# Patient Record
Sex: FEMALE | Race: WHITE | NOT HISPANIC OR LATINO | Employment: OTHER | URBAN - METROPOLITAN AREA
[De-identification: names, ages, dates, MRNs, and addresses within clinical notes are randomized per-mention and may not be internally consistent; named-entity substitution may affect disease eponyms.]

---

## 2023-01-31 PROBLEM — H52.7 REFRACTIVE ERROR: Status: ACTIVE | Noted: 2023-01-31

## 2023-01-31 PROBLEM — F51.01 PRIMARY INSOMNIA: Status: ACTIVE | Noted: 2023-01-31

## 2023-01-31 PROBLEM — K21.9 GERD WITHOUT ESOPHAGITIS: Status: ACTIVE | Noted: 2023-01-31

## 2023-01-31 PROBLEM — I48.91 ATRIAL FIBRILLATION (MULTI): Status: ACTIVE | Noted: 2023-01-31

## 2023-01-31 PROBLEM — E66.811 CLASS 1 OBESITY WITH BODY MASS INDEX (BMI) OF 31.0 TO 31.9 IN ADULT: Status: ACTIVE | Noted: 2023-01-31

## 2023-01-31 PROBLEM — R05.8 SPASMODIC COUGH: Status: ACTIVE | Noted: 2023-01-31

## 2023-01-31 PROBLEM — M48.062 SPINAL STENOSIS OF LUMBAR REGION WITH NEUROGENIC CLAUDICATION: Status: ACTIVE | Noted: 2023-01-31

## 2023-01-31 PROBLEM — H02.033: Status: ACTIVE | Noted: 2023-01-31

## 2023-01-31 PROBLEM — R42 DIZZINESS: Status: ACTIVE | Noted: 2023-01-31

## 2023-01-31 PROBLEM — J34.2 DEVIATED SEPTUM: Status: ACTIVE | Noted: 2023-01-31

## 2023-01-31 PROBLEM — I10 BENIGN ESSENTIAL HTN: Status: ACTIVE | Noted: 2023-01-31

## 2023-01-31 PROBLEM — M54.12 CERVICAL RADICULOPATHY AT C5: Status: ACTIVE | Noted: 2023-01-31

## 2023-01-31 PROBLEM — Z97.3 WEARS GLASSES: Status: ACTIVE | Noted: 2023-01-31

## 2023-01-31 PROBLEM — M85.88 OSTEOPENIA OF SPINE: Status: ACTIVE | Noted: 2023-01-31

## 2023-01-31 PROBLEM — F41.9 ANXIETY: Status: ACTIVE | Noted: 2023-01-31

## 2023-01-31 PROBLEM — E66.9 CLASS 1 OBESITY WITH BODY MASS INDEX (BMI) OF 31.0 TO 31.9 IN ADULT: Status: ACTIVE | Noted: 2023-01-31

## 2023-01-31 PROBLEM — H26.9 BILATERAL CATARACTS: Status: ACTIVE | Noted: 2023-01-31

## 2023-01-31 PROBLEM — J31.0 CHRONIC RHINITIS: Status: ACTIVE | Noted: 2023-01-31

## 2023-01-31 PROBLEM — E78.5 HYPERLIPIDEMIA: Status: ACTIVE | Noted: 2023-01-31

## 2023-01-31 PROBLEM — H02.003: Status: ACTIVE | Noted: 2023-01-31

## 2023-01-31 PROBLEM — R11.0 NAUSEA IN ADULT: Status: ACTIVE | Noted: 2023-01-31

## 2023-01-31 PROBLEM — R26.89 BALANCE DISORDER: Status: ACTIVE | Noted: 2023-01-31

## 2023-01-31 PROBLEM — H18.9 EXPOSURE KERATOPATHY: Status: ACTIVE | Noted: 2023-01-31

## 2023-01-31 PROBLEM — M43.10 SPONDYLOLISTHESIS, ACQUIRED: Status: ACTIVE | Noted: 2023-01-31

## 2023-01-31 PROBLEM — H02.036: Status: ACTIVE | Noted: 2023-01-31

## 2023-01-31 PROBLEM — H43.813 DEGENERATION OF POSTERIOR VITREOUS BODY OF BOTH EYES: Status: ACTIVE | Noted: 2023-01-31

## 2023-01-31 RX ORDER — ESCITALOPRAM OXALATE 10 MG/1
1 TABLET ORAL DAILY
COMMUNITY
Start: 2012-01-11 | End: 2023-03-16 | Stop reason: ALTCHOICE

## 2023-01-31 RX ORDER — DILTIAZEM HYDROCHLORIDE 120 MG/1
1 TABLET, FILM COATED ORAL 2 TIMES DAILY
COMMUNITY
Start: 2019-02-13 | End: 2023-07-19 | Stop reason: SDUPTHER

## 2023-01-31 RX ORDER — GABAPENTIN 100 MG/1
3 CAPSULE ORAL 3 TIMES DAILY
COMMUNITY
Start: 2020-02-04 | End: 2024-05-24 | Stop reason: ENTERED-IN-ERROR

## 2023-01-31 RX ORDER — TRAZODONE HYDROCHLORIDE 50 MG/1
1 TABLET ORAL DAILY
COMMUNITY
Start: 2019-08-19 | End: 2023-03-15 | Stop reason: SINTOL

## 2023-01-31 RX ORDER — FLUTICASONE PROPIONATE 50 MCG
1 SPRAY, SUSPENSION (ML) NASAL 2 TIMES DAILY
COMMUNITY
End: 2024-06-10

## 2023-01-31 RX ORDER — OMEPRAZOLE 40 MG/1
1 CAPSULE, DELAYED RELEASE ORAL DAILY
COMMUNITY
Start: 2015-03-17 | End: 2023-07-19 | Stop reason: SDUPTHER

## 2023-03-14 ENCOUNTER — APPOINTMENT (OUTPATIENT)
Dept: PRIMARY CARE | Facility: CLINIC | Age: 83
End: 2023-03-14
Payer: MEDICARE

## 2023-03-15 ENCOUNTER — OFFICE VISIT (OUTPATIENT)
Dept: PRIMARY CARE | Facility: CLINIC | Age: 83
End: 2023-03-15
Payer: MEDICARE

## 2023-03-15 VITALS
RESPIRATION RATE: 16 BRPM | HEIGHT: 63 IN | DIASTOLIC BLOOD PRESSURE: 81 MMHG | OXYGEN SATURATION: 97 % | BODY MASS INDEX: 31.01 KG/M2 | WEIGHT: 175 LBS | HEART RATE: 81 BPM | SYSTOLIC BLOOD PRESSURE: 127 MMHG

## 2023-03-15 DIAGNOSIS — B37.2 CANDIDAL INTERTRIGO: ICD-10-CM

## 2023-03-15 DIAGNOSIS — Z00.00 HEALTH CARE MAINTENANCE: ICD-10-CM

## 2023-03-15 DIAGNOSIS — I10 HYPERTENSION, UNSPECIFIED TYPE: ICD-10-CM

## 2023-03-15 DIAGNOSIS — F41.9 ANXIETY: Primary | ICD-10-CM

## 2023-03-15 DIAGNOSIS — R68.89 FORGETFULNESS: ICD-10-CM

## 2023-03-15 DIAGNOSIS — F51.01 PRIMARY INSOMNIA: ICD-10-CM

## 2023-03-15 DIAGNOSIS — B37.2 CANDIDIASIS, INTERTRIGO: ICD-10-CM

## 2023-03-15 PROBLEM — R05.8 SPASMODIC COUGH: Status: RESOLVED | Noted: 2023-01-31 | Resolved: 2023-03-15

## 2023-03-15 PROBLEM — N39.0 UTI (URINARY TRACT INFECTION): Status: ACTIVE | Noted: 2023-03-15

## 2023-03-15 PROBLEM — U07.1 COVID-19: Status: ACTIVE | Noted: 2023-03-15

## 2023-03-15 PROBLEM — R42 VERTIGO: Status: ACTIVE | Noted: 2023-03-15

## 2023-03-15 PROBLEM — R04.0 EPISTAXIS: Status: ACTIVE | Noted: 2023-03-15

## 2023-03-15 PROBLEM — R11.0 NAUSEA IN ADULT: Status: RESOLVED | Noted: 2023-01-31 | Resolved: 2023-03-15

## 2023-03-15 PROCEDURE — 3074F SYST BP LT 130 MM HG: CPT | Performed by: INTERNAL MEDICINE

## 2023-03-15 PROCEDURE — 3079F DIAST BP 80-89 MM HG: CPT | Performed by: INTERNAL MEDICINE

## 2023-03-15 PROCEDURE — 1036F TOBACCO NON-USER: CPT | Performed by: INTERNAL MEDICINE

## 2023-03-15 PROCEDURE — 99214 OFFICE O/P EST MOD 30 MIN: CPT | Performed by: INTERNAL MEDICINE

## 2023-03-15 PROCEDURE — 1160F RVW MEDS BY RX/DR IN RCRD: CPT | Performed by: INTERNAL MEDICINE

## 2023-03-15 PROCEDURE — 1159F MED LIST DOCD IN RCRD: CPT | Performed by: INTERNAL MEDICINE

## 2023-03-15 RX ORDER — ESCITALOPRAM OXALATE 10 MG/1
10 TABLET ORAL ONCE
Status: DISCONTINUED | OUTPATIENT
Start: 2023-03-15 | End: 2023-03-16

## 2023-03-15 RX ORDER — MULTIVITAMIN
1 TABLET ORAL DAILY
COMMUNITY
End: 2024-06-10

## 2023-03-15 RX ORDER — NYSTATIN 100000 U/G
CREAM TOPICAL 2 TIMES DAILY
Qty: 30 G | Refills: 1 | Status: SHIPPED | OUTPATIENT
Start: 2023-03-15 | End: 2023-03-29

## 2023-03-15 RX ORDER — METOPROLOL SUCCINATE 25 MG/1
TABLET, EXTENDED RELEASE ORAL
COMMUNITY
Start: 2023-03-09 | End: 2024-02-19

## 2023-03-15 RX ORDER — NYSTATIN 100000 [USP'U]/G
POWDER TOPICAL 3 TIMES DAILY
Qty: 360 G | Refills: 11 | Status: SHIPPED | OUTPATIENT
Start: 2023-03-15 | End: 2023-04-14

## 2023-03-15 RX ORDER — POTASSIUM CHLORIDE 750 MG/1
10 TABLET, EXTENDED RELEASE ORAL DAILY
COMMUNITY
Start: 2023-03-09 | End: 2024-01-22

## 2023-03-15 RX ORDER — NYSTATIN 100000 [USP'U]/G
POWDER TOPICAL
COMMUNITY
Start: 2022-09-03 | End: 2023-03-15 | Stop reason: SDUPTHER

## 2023-03-15 ASSESSMENT — ENCOUNTER SYMPTOMS: NERVOUS/ANXIOUS: 1

## 2023-03-15 NOTE — PROGRESS NOTES
Subjective   Patient ID: Mercedes Castellanos is a 83 y.o. female who presents for Follow-up (Pt here for results ).  HPI    Fu  memory loss and review ct head. Doing better.   Never took trazodone. Would  like  to take lexapro. Had  before and it worked well.     Treated anxiety  and insomnia  due to  significant stressors in life.     Co left inframmamry  pain  3 dasy.     Co  candida inf  under  breast.       Review of Systems   Neurological:         Per hpi   Psychiatric/Behavioral:  The patient is nervous/anxious.    All other systems reviewed and are negative.      Objective   Physical Exam  Vitals and nursing note reviewed. Exam conducted with a chaperone present.   Constitutional:       Appearance: Normal appearance.   HENT:      Head: Normocephalic and atraumatic.      Right Ear: Tympanic membrane, ear canal and external ear normal.      Left Ear: Tympanic membrane, ear canal and external ear normal.      Nose: Nose normal.      Mouth/Throat:      Mouth: Mucous membranes are moist.      Pharynx: Oropharynx is clear.   Eyes:      Extraocular Movements: Extraocular movements intact.      Conjunctiva/sclera: Conjunctivae normal.      Pupils: Pupils are equal, round, and reactive to light.   Cardiovascular:      Rate and Rhythm: Normal rate and regular rhythm.      Pulses: Normal pulses.      Heart sounds: Normal heart sounds.   Pulmonary:      Effort: Pulmonary effort is normal.      Breath sounds: Normal breath sounds.   Abdominal:      General: Abdomen is flat. Bowel sounds are normal.      Palpations: Abdomen is soft.   Musculoskeletal:         General: Normal range of motion.      Cervical back: Neck supple.   Skin:     General: Skin is warm and dry.      Capillary Refill: Capillary refill takes 2 to 3 seconds.   Neurological:      General: No focal deficit present.      Mental Status: She is alert and oriented to person, place, and time. Mental status is at baseline.   Psychiatric:         Mood and Affect: Mood  normal.         Behavior: Behavior normal.         Thought Content: Thought content normal.         Judgment: Judgment normal.         Assessment/Plan   Problem List Items Addressed This Visit          Medium    Anxiety - Primary    Relevant Medications    escitalopram (Lexapro) tablet 10 mg    Primary insomnia    Candidal intertrigo    Relevant Medications    nystatin (Mycostatin) cream     Other Visit Diagnoses       Hypertension, unspecified type        Health care maintenance        Relevant Medications    multivitamin tablet    potassium chloride CR 10 mEq ER tablet    Candidiasis, intertrigo        Relevant Medications    nystatin (Mycostatin) 100,000 unit/gram powder    Forgetfulness                                  Memory issues -Resolved.     Time Spent  Prep time on day of patient encounter: 5 minutes  Time spent directly with patient, family or caregiver: 30 minutes  Documentation Time: 5 minutes

## 2023-03-16 RX ORDER — ESCITALOPRAM OXALATE 10 MG/1
10 TABLET ORAL DAILY
Qty: 30 TABLET | Refills: 5 | Status: SHIPPED | OUTPATIENT
Start: 2023-03-16 | End: 2023-12-20 | Stop reason: SDUPTHER

## 2023-05-10 ENCOUNTER — TELEPHONE (OUTPATIENT)
Dept: PRIMARY CARE | Facility: CLINIC | Age: 83
End: 2023-05-10
Payer: MEDICARE

## 2023-05-10 DIAGNOSIS — N30.00 ACUTE CYSTITIS WITHOUT HEMATURIA: Primary | ICD-10-CM

## 2023-05-10 RX ORDER — CEPHALEXIN 500 MG/1
500 CAPSULE ORAL 2 TIMES DAILY
Qty: 20 CAPSULE | Refills: 0 | Status: SHIPPED | OUTPATIENT
Start: 2023-05-10 | End: 2023-05-20

## 2023-05-30 ENCOUNTER — TELEPHONE (OUTPATIENT)
Dept: PRIMARY CARE | Facility: CLINIC | Age: 83
End: 2023-05-30
Payer: MEDICARE

## 2023-05-30 DIAGNOSIS — R19.7 DIARRHEA, UNSPECIFIED TYPE: ICD-10-CM

## 2023-05-30 NOTE — TELEPHONE ENCOUNTER
Homecare CNP Called to stat pt is experiencing diarrhea after finishing an antibiotic and has lack of appetite, asking for stool test for cdiff..

## 2023-06-06 ENCOUNTER — TELEPHONE (OUTPATIENT)
Dept: PRIMARY CARE | Facility: CLINIC | Age: 83
End: 2023-06-06
Payer: MEDICARE

## 2023-06-06 DIAGNOSIS — N30.00 ACUTE CYSTITIS WITHOUT HEMATURIA: Primary | ICD-10-CM

## 2023-06-06 RX ORDER — CEPHALEXIN 500 MG/1
500 CAPSULE ORAL 2 TIMES DAILY
Qty: 20 CAPSULE | Refills: 0 | Status: SHIPPED | OUTPATIENT
Start: 2023-06-06 | End: 2023-06-07

## 2023-06-06 NOTE — TELEPHONE ENCOUNTER
Pt states she has bladder infection she has pain traveling down her arms and legs x 3 days. Asking for antibiotic to help

## 2023-06-07 DIAGNOSIS — N30.00 ACUTE CYSTITIS WITHOUT HEMATURIA: ICD-10-CM

## 2023-06-07 RX ORDER — CEPHALEXIN 500 MG/1
500 CAPSULE ORAL 2 TIMES DAILY
Qty: 20 CAPSULE | Refills: 0 | Status: SHIPPED | OUTPATIENT
Start: 2023-06-07 | End: 2023-06-17

## 2023-06-18 PROBLEM — Z00.00 MEDICARE ANNUAL WELLNESS VISIT, SUBSEQUENT: Status: ACTIVE | Noted: 2023-06-18

## 2023-07-19 DIAGNOSIS — K21.9 GERD WITHOUT ESOPHAGITIS: ICD-10-CM

## 2023-07-19 DIAGNOSIS — I10 BENIGN ESSENTIAL HTN: Primary | ICD-10-CM

## 2023-07-19 RX ORDER — DILTIAZEM HYDROCHLORIDE 120 MG/1
120 TABLET, FILM COATED ORAL 2 TIMES DAILY
Qty: 90 TABLET | Refills: 1 | Status: SHIPPED | OUTPATIENT
Start: 2023-07-19 | End: 2023-10-26 | Stop reason: SDUPTHER

## 2023-07-19 RX ORDER — OMEPRAZOLE 40 MG/1
40 CAPSULE, DELAYED RELEASE ORAL
Qty: 90 CAPSULE | Refills: 3 | Status: SHIPPED | OUTPATIENT
Start: 2023-07-19

## 2023-08-15 PROBLEM — I42.9 CARDIOMYOPATHY (MULTI): Status: ACTIVE | Noted: 2023-08-15

## 2023-08-15 PROBLEM — I48.0 PAF (PAROXYSMAL ATRIAL FIBRILLATION) (MULTI): Status: ACTIVE | Noted: 2023-08-15

## 2023-08-15 PROBLEM — I20.89 ANGINAL EQUIVALENT (CMS-HCC): Status: ACTIVE | Noted: 2023-08-15

## 2023-08-15 PROBLEM — R19.7 DIARRHEA: Status: RESOLVED | Noted: 2023-08-15 | Resolved: 2023-08-15

## 2023-08-15 PROBLEM — I10 ESSENTIAL HYPERTENSION: Status: ACTIVE | Noted: 2023-08-15

## 2023-08-16 ENCOUNTER — APPOINTMENT (OUTPATIENT)
Dept: PRIMARY CARE | Facility: CLINIC | Age: 83
End: 2023-08-16
Payer: MEDICARE

## 2023-09-05 NOTE — PROGRESS NOTES
Subjective   Patient ID: Mercedes Castellanos is a 83 y.o. female who presents for Annual Exam (Medicare wellness ) and Med Refill.  Med Refill      Awv    Fu  grief.      Needs melinda and  dexa.     Below is the patient's most recent value for Albumin, ALT, AST, BUN, Calcium, Chloride, Cholesterol, CO2, Creatinine, GFR, Glucose, HDL, Hematocrit, Hemoglobin, Hemoglobin A1C, LDL, Magnesium, Phosphorus, Platelets, Potassium, PSA, Sodium, Triglycerides, and WBC.   Lab Results   Component Value Date    ALBUMIN 4.1 01/27/2023    ALT 14 01/27/2023    AST 19 01/27/2023    BUN 17 01/27/2023    CALCIUM 10.2 01/27/2023     01/27/2023    CHOL 149 01/27/2023    CO2 31 01/27/2023    CREATININE 1.04 01/27/2023    HDL 46.8 01/27/2023    HCT 45.2 02/13/2023    HGB 14.9 02/13/2023    PHOS 3.0 10/20/2017     02/13/2023    K 4.6 01/27/2023     01/27/2023    TRIG 66 01/27/2023    WBC 9.3 02/13/2023     Note: for a comprehensive list of the patient's lab results, access the Results Review activity.    Review of Systems   Constitutional: Negative.    Psychiatric/Behavioral:  Positive for dysphoric mood.    All other systems reviewed and are negative.      Objective   Physical Exam  Vitals and nursing note reviewed.   Constitutional:       Appearance: Normal appearance.   HENT:      Head: Normocephalic and atraumatic.      Nose: Nose normal.   Eyes:      Conjunctiva/sclera: Conjunctivae normal.   Cardiovascular:      Rate and Rhythm: Normal rate and regular rhythm.   Pulmonary:      Effort: Pulmonary effort is normal.   Skin:     General: Skin is dry.   Neurological:      General: No focal deficit present.      Mental Status: She is alert and oriented to person, place, and time. Mental status is at baseline.   Psychiatric:         Mood and Affect: Mood normal.         Behavior: Behavior normal.         Assessment/Plan        Awv    Below is the patient's most recent value for Albumin, ALT, AST, BUN, Calcium, Chloride,  Cholesterol, CO2, Creatinine, GFR, Glucose, HDL, Hematocrit, Hemoglobin, Hemoglobin A1C, LDL, Magnesium, Phosphorus, Platelets, Potassium, PSA, Sodium, Triglycerides, and WBC.   Lab Results   Component Value Date    ALBUMIN 4.1 01/27/2023    ALT 14 01/27/2023    AST 19 01/27/2023    BUN 17 01/27/2023    CALCIUM 10.2 01/27/2023     01/27/2023    CHOL 149 01/27/2023    CO2 31 01/27/2023    CREATININE 1.04 01/27/2023    HDL 46.8 01/27/2023    HCT 45.2 02/13/2023    HGB 14.9 02/13/2023    PHOS 3.0 10/20/2017     02/13/2023    K 4.6 01/27/2023     01/27/2023    TRIG 66 01/27/2023    WBC 9.3 02/13/2023     Note: for a comprehensive list of the patient's lab results, access the Results Review activity.

## 2023-09-06 ENCOUNTER — OFFICE VISIT (OUTPATIENT)
Dept: PRIMARY CARE | Facility: CLINIC | Age: 83
End: 2023-09-06
Payer: MEDICARE

## 2023-09-06 ENCOUNTER — LAB (OUTPATIENT)
Dept: LAB | Facility: LAB | Age: 83
End: 2023-09-06
Payer: MEDICARE

## 2023-09-06 VITALS
HEART RATE: 86 BPM | OXYGEN SATURATION: 94 % | SYSTOLIC BLOOD PRESSURE: 96 MMHG | BODY MASS INDEX: 30.65 KG/M2 | WEIGHT: 173 LBS | DIASTOLIC BLOOD PRESSURE: 65 MMHG | HEIGHT: 63 IN

## 2023-09-06 DIAGNOSIS — Z79.899 MEDICATION MANAGEMENT: ICD-10-CM

## 2023-09-06 DIAGNOSIS — I48.91 ATRIAL FIBRILLATION, UNSPECIFIED TYPE (MULTI): ICD-10-CM

## 2023-09-06 DIAGNOSIS — I10 ESSENTIAL HYPERTENSION: ICD-10-CM

## 2023-09-06 DIAGNOSIS — I42.9 CARDIOMYOPATHY, UNSPECIFIED TYPE (MULTI): ICD-10-CM

## 2023-09-06 DIAGNOSIS — Z23 NEED FOR VACCINATION: ICD-10-CM

## 2023-09-06 DIAGNOSIS — E78.5 HYPERLIPIDEMIA, UNSPECIFIED HYPERLIPIDEMIA TYPE: ICD-10-CM

## 2023-09-06 DIAGNOSIS — K21.9 GERD WITHOUT ESOPHAGITIS: ICD-10-CM

## 2023-09-06 DIAGNOSIS — F43.21 GRIEF REACTION: ICD-10-CM

## 2023-09-06 DIAGNOSIS — E66.9 CLASS 1 OBESITY WITH BODY MASS INDEX (BMI) OF 30.0 TO 30.9 IN ADULT, UNSPECIFIED OBESITY TYPE, UNSPECIFIED WHETHER SERIOUS COMORBIDITY PRESENT: ICD-10-CM

## 2023-09-06 DIAGNOSIS — I20.89 ANGINAL EQUIVALENT (CMS-HCC): ICD-10-CM

## 2023-09-06 DIAGNOSIS — Z00.00 HEALTHCARE MAINTENANCE: Primary | ICD-10-CM

## 2023-09-06 DIAGNOSIS — Z13.89 SCREENING FOR MULTIPLE CONDITIONS: ICD-10-CM

## 2023-09-06 PROCEDURE — 99397 PER PM REEVAL EST PAT 65+ YR: CPT | Performed by: INTERNAL MEDICINE

## 2023-09-06 PROCEDURE — 1159F MED LIST DOCD IN RCRD: CPT | Performed by: INTERNAL MEDICINE

## 2023-09-06 PROCEDURE — 1036F TOBACCO NON-USER: CPT | Performed by: INTERNAL MEDICINE

## 2023-09-06 PROCEDURE — 3078F DIAST BP <80 MM HG: CPT | Performed by: INTERNAL MEDICINE

## 2023-09-06 PROCEDURE — 82306 VITAMIN D 25 HYDROXY: CPT

## 2023-09-06 PROCEDURE — 1160F RVW MEDS BY RX/DR IN RCRD: CPT | Performed by: INTERNAL MEDICINE

## 2023-09-06 PROCEDURE — 1170F FXNL STATUS ASSESSED: CPT | Performed by: INTERNAL MEDICINE

## 2023-09-06 PROCEDURE — G0439 PPPS, SUBSEQ VISIT: HCPCS | Performed by: INTERNAL MEDICINE

## 2023-09-06 PROCEDURE — 1126F AMNT PAIN NOTED NONE PRSNT: CPT | Performed by: INTERNAL MEDICINE

## 2023-09-06 PROCEDURE — 82607 VITAMIN B-12: CPT

## 2023-09-06 PROCEDURE — 36415 COLL VENOUS BLD VENIPUNCTURE: CPT

## 2023-09-06 PROCEDURE — 3074F SYST BP LT 130 MM HG: CPT | Performed by: INTERNAL MEDICINE

## 2023-09-06 RX ORDER — CALCIUM CARBONATE 300MG(750)
400 TABLET,CHEWABLE ORAL DAILY
COMMUNITY

## 2023-09-06 ASSESSMENT — ENCOUNTER SYMPTOMS
OCCASIONAL FEELINGS OF UNSTEADINESS: 0
DYSPHORIC MOOD: 1
CONSTITUTIONAL NEGATIVE: 1
AGITATION: 1
DEPRESSION: 0
LOSS OF SENSATION IN FEET: 0

## 2023-09-06 ASSESSMENT — COLUMBIA-SUICIDE SEVERITY RATING SCALE - C-SSRS
6. HAVE YOU EVER DONE ANYTHING, STARTED TO DO ANYTHING, OR PREPARED TO DO ANYTHING TO END YOUR LIFE?: NO
1. IN THE PAST MONTH, HAVE YOU WISHED YOU WERE DEAD OR WISHED YOU COULD GO TO SLEEP AND NOT WAKE UP?: NO
2. HAVE YOU ACTUALLY HAD ANY THOUGHTS OF KILLING YOURSELF?: NO

## 2023-09-06 ASSESSMENT — ACTIVITIES OF DAILY LIVING (ADL)
TAKING_MEDICATION: INDEPENDENT
DOING_HOUSEWORK: INDEPENDENT
DRESSING: INDEPENDENT
GROCERY_SHOPPING: INDEPENDENT
MANAGING_FINANCES: INDEPENDENT
BATHING: INDEPENDENT

## 2023-09-06 ASSESSMENT — PATIENT HEALTH QUESTIONNAIRE - PHQ9
2. FEELING DOWN, DEPRESSED OR HOPELESS: NOT AT ALL
1. LITTLE INTEREST OR PLEASURE IN DOING THINGS: NOT AT ALL
SUM OF ALL RESPONSES TO PHQ9 QUESTIONS 1 AND 2: 0

## 2023-09-06 NOTE — PROGRESS NOTES
"Subjective   Patient ID: Mercedes Castellanos is a 83 y.o. female who presents for Annual Exam (Medicare wellness ) and Med Refill.    HPI     Review of Systems    Objective   BP 96/65   Pulse 86   Ht 1.6 m (5' 3\")   Wt 78.5 kg (173 lb)   SpO2 94%   BMI 30.65 kg/m²     Physical Exam    Assessment/Plan          "

## 2023-09-06 NOTE — PROGRESS NOTES
"Subjective   Reason for Visit: Mercedes Castellanos is an 83 y.o. female here for a Medicare Wellness visit.     Past Medical, Surgical, and Family History reviewed and updated in chart.    Reviewed all medications by prescribing practitioner or clinical pharmacist (such as prescriptions, OTCs, herbal therapies and supplements) and documented in the medical record.    HPI    Grief rx .  Feeling better.  Son  schizophrenic fell of a bridge.     Feels her memory is  better.    Dtr added magnesium  pills.       Patient Care Team:  Tiana Danielle MD as PCP - General  Tiana Danielle MD (Internal Medicine)     Review of Systems   Constitutional: Negative.    Psychiatric/Behavioral:  Positive for agitation and dysphoric mood.    All other systems reviewed and are negative.      Objective   Vitals:  BP 96/65   Pulse 86   Ht 1.6 m (5' 3\")   Wt 78.5 kg (173 lb)   SpO2 94%   BMI 30.65 kg/m²       Physical Exam  Vitals and nursing note reviewed.   Constitutional:       Appearance: Normal appearance.   HENT:      Head: Normocephalic and atraumatic.      Nose: Nose normal.   Eyes:      Conjunctiva/sclera: Conjunctivae normal.   Cardiovascular:      Rate and Rhythm: Normal rate and regular rhythm.      Pulses: Normal pulses.      Heart sounds: Normal heart sounds.   Pulmonary:      Effort: Pulmonary effort is normal.      Breath sounds: Normal breath sounds.   Musculoskeletal:         General: Normal range of motion.   Skin:     General: Skin is dry.      Capillary Refill: Capillary refill takes less than 2 seconds.   Neurological:      General: No focal deficit present.      Mental Status: She is alert and oriented to person, place, and time. Mental status is at baseline.   Psychiatric:         Mood and Affect: Mood normal.         Behavior: Behavior normal.         Thought Content: Thought content normal.         Assessment/Plan   Problem List Items Addressed This Visit          Medium    Anginal equivalent (CMS/HCC)    " Atrial fibrillation (CMS/HCC)    Cardiomyopathy (CMS/HCC)    Essential hypertension    GERD without esophagitis    Relevant Orders    Vitamin D 25-Hydroxy,Total (for eval of Vitamin D levels)    Vitamin B12    Hyperlipidemia     Other Visit Diagnoses       Healthcare maintenance    -  Primary    Relevant Orders    1 Year Follow Up In Primary Care - Wellness Exam    3 Month Follow Up In Primary Care    Grief reaction        Class 1 obesity with body mass index (BMI) of 30.0 to 30.9 in adult, unspecified obesity type, unspecified whether serious comorbidity present        Relevant Orders    Vitamin D 25-Hydroxy,Total (for eval of Vitamin D levels)    Vitamin B12    Screening for multiple conditions        Need for vaccination        Relevant Medications    zoster vaccine-recombinant adjuvanted (Shingrix) 50 mcg/0.5 mL vaccine    Medication management        Relevant Orders    Vitamin D 25-Hydroxy,Total (for eval of Vitamin D levels)    Vitamin B12        Vaccines  rsv  flu and  covid.        Chronic problems controlled  on current treatment  unless otherwise noted.     CHART  REVIEWED AND  RECONCILED WITH OLD DATA / UPDATED IN NEW SYSTEM:  MEDS,  PROBLEMS,  ALLERGIES, SOC HISTORY.

## 2023-09-07 LAB
CALCIDIOL (25 OH VITAMIN D3) (NG/ML) IN SER/PLAS: 32 NG/ML
COBALAMIN (VITAMIN B12) (PG/ML) IN SER/PLAS: 333 PG/ML (ref 211–911)

## 2023-10-26 DIAGNOSIS — I10 BENIGN ESSENTIAL HTN: ICD-10-CM

## 2023-10-26 RX ORDER — DILTIAZEM HYDROCHLORIDE 120 MG/1
120 TABLET, FILM COATED ORAL 2 TIMES DAILY
Qty: 90 TABLET | Refills: 1 | Status: SHIPPED | OUTPATIENT
Start: 2023-10-26 | End: 2024-01-28

## 2023-12-20 DIAGNOSIS — F41.9 ANXIETY: ICD-10-CM

## 2023-12-20 RX ORDER — ESCITALOPRAM OXALATE 10 MG/1
10 TABLET ORAL DAILY
Qty: 30 TABLET | Refills: 5 | Status: SHIPPED | OUTPATIENT
Start: 2023-12-20 | End: 2024-06-17

## 2024-01-16 ENCOUNTER — OFFICE VISIT (OUTPATIENT)
Dept: PRIMARY CARE | Facility: CLINIC | Age: 84
End: 2024-01-16
Payer: MEDICARE

## 2024-01-16 VITALS
BODY MASS INDEX: 31.92 KG/M2 | DIASTOLIC BLOOD PRESSURE: 80 MMHG | OXYGEN SATURATION: 98 % | WEIGHT: 180.2 LBS | HEART RATE: 77 BPM | SYSTOLIC BLOOD PRESSURE: 122 MMHG

## 2024-01-16 DIAGNOSIS — R39.9 UTI SYMPTOMS: Primary | ICD-10-CM

## 2024-01-16 LAB
POC APPEARANCE, URINE: CLEAR
POC BILIRUBIN, URINE: NEGATIVE
POC BLOOD, URINE: ABNORMAL
POC COLOR, URINE: YELLOW
POC GLUCOSE, URINE: NEGATIVE MG/DL
POC KETONES, URINE: NEGATIVE MG/DL
POC LEUKOCYTES, URINE: ABNORMAL
POC NITRITE,URINE: NEGATIVE
POC PH, URINE: 5.5 PH
POC PROTEIN, URINE: ABNORMAL MG/DL
POC SPECIFIC GRAVITY, URINE: 1.01
POC UROBILINOGEN, URINE: 0.2 EU/DL

## 2024-01-16 PROCEDURE — 81003 URINALYSIS AUTO W/O SCOPE: CPT | Performed by: STUDENT IN AN ORGANIZED HEALTH CARE EDUCATION/TRAINING PROGRAM

## 2024-01-16 PROCEDURE — 1159F MED LIST DOCD IN RCRD: CPT | Performed by: STUDENT IN AN ORGANIZED HEALTH CARE EDUCATION/TRAINING PROGRAM

## 2024-01-16 PROCEDURE — 99213 OFFICE O/P EST LOW 20 MIN: CPT | Performed by: STUDENT IN AN ORGANIZED HEALTH CARE EDUCATION/TRAINING PROGRAM

## 2024-01-16 PROCEDURE — 1126F AMNT PAIN NOTED NONE PRSNT: CPT | Performed by: STUDENT IN AN ORGANIZED HEALTH CARE EDUCATION/TRAINING PROGRAM

## 2024-01-16 PROCEDURE — 3079F DIAST BP 80-89 MM HG: CPT | Performed by: STUDENT IN AN ORGANIZED HEALTH CARE EDUCATION/TRAINING PROGRAM

## 2024-01-16 PROCEDURE — 1036F TOBACCO NON-USER: CPT | Performed by: STUDENT IN AN ORGANIZED HEALTH CARE EDUCATION/TRAINING PROGRAM

## 2024-01-16 PROCEDURE — 3074F SYST BP LT 130 MM HG: CPT | Performed by: STUDENT IN AN ORGANIZED HEALTH CARE EDUCATION/TRAINING PROGRAM

## 2024-01-16 PROCEDURE — 1160F RVW MEDS BY RX/DR IN RCRD: CPT | Performed by: STUDENT IN AN ORGANIZED HEALTH CARE EDUCATION/TRAINING PROGRAM

## 2024-01-16 PROCEDURE — 87086 URINE CULTURE/COLONY COUNT: CPT | Mod: WESLAB | Performed by: STUDENT IN AN ORGANIZED HEALTH CARE EDUCATION/TRAINING PROGRAM

## 2024-01-16 RX ORDER — CIPROFLOXACIN 500 MG/1
500 TABLET ORAL 2 TIMES DAILY
Qty: 10 TABLET | Refills: 0 | Status: SHIPPED | OUTPATIENT
Start: 2024-01-16 | End: 2024-01-21

## 2024-01-16 ASSESSMENT — COLUMBIA-SUICIDE SEVERITY RATING SCALE - C-SSRS
1. IN THE PAST MONTH, HAVE YOU WISHED YOU WERE DEAD OR WISHED YOU COULD GO TO SLEEP AND NOT WAKE UP?: NO
6. HAVE YOU EVER DONE ANYTHING, STARTED TO DO ANYTHING, OR PREPARED TO DO ANYTHING TO END YOUR LIFE?: NO
2. HAVE YOU ACTUALLY HAD ANY THOUGHTS OF KILLING YOURSELF?: NO

## 2024-01-16 ASSESSMENT — PATIENT HEALTH QUESTIONNAIRE - PHQ9
2. FEELING DOWN, DEPRESSED OR HOPELESS: NOT AT ALL
SUM OF ALL RESPONSES TO PHQ9 QUESTIONS 1 AND 2: 0
1. LITTLE INTEREST OR PLEASURE IN DOING THINGS: NOT AT ALL

## 2024-01-16 ASSESSMENT — PAIN SCALES - GENERAL: PAINLEVEL: 0-NO PAIN

## 2024-01-16 NOTE — PROGRESS NOTES
Subjective   Patient ID: Mercedes Castellanos is a 83 y.o. female who presents for UTI (Pt is here for UTI symptoms, such as frequent urination and body aches, since yesterday / nr).    HPI  84 yo female here for UTI symptoms  UTI symptoms  Frequent urination  Body aches since yesterday  Burning with urination  No fevers  No back pain  No stomach pain    Review of Systems  All pertinent positive symptoms are included in the history of present illness.    All other systems have been reviewed and are negative and noncontributory to this patient's current ailments.     Allergies   Allergen Reactions    Hydrocodone-Acetaminophen Unknown and Other    Nitrofurantoin Other     leg weakness        Immunization History   Administered Date(s) Administered    Flu vaccine, quadrivalent, high-dose, preservative free, age 65y+ (FLUZONE) 10/31/2022    Influenza, High Dose Seasonal, Preservative Free 10/20/2017, 09/22/2020    Influenza, Seasonal, Quadrivalent, Adjuvanted 08/13/2021    Influenza, seasonal, injectable 09/30/2015, 08/13/2021    Influenza, trivalent, adjuvanted 08/20/2019    Moderna SARS-CoV-2 Vaccination 01/29/2021, 02/26/2021, 11/30/2021    Novel influenza-H1N1-09, preservative-free 01/27/2010    Pneumococcal conjugate vaccine, 13-valent (PREVNAR 13) 04/13/2016, 04/30/2019    Pneumococcal polysaccharide vaccine, 23-valent, age 2 years and older (PNEUMOVAX 23) 01/03/2007, 04/24/2020    Tdap vaccine, age 7 year and older (BOOSTRIX) 04/30/2019    Zoster vaccine, recombinant, adult (SHINGRIX) 08/20/2019, 10/31/2019    Zoster, Unspecified 08/20/2019, 10/31/2019    Zoster, live 09/12/2014       Objective   Vitals:    01/16/24 1134   BP: 122/80   Pulse: 77   SpO2: 98%   Weight: 81.7 kg (180 lb 3.2 oz)       Physical Exam  CONSTITUTIONAL - well nourished, well developed, looks like stated age, in no acute distress  SKIN - normal skin color and pigmentation, normal skin turgor without rash, lesions, or nodules visualized  HEAD -  no trauma, normocephalic  EYES - extraocular muscles are intact  ENT - atraumatic  NECK - no neck mass was observed  LUNGS - CTA B/L  CARDIAC - irregular rhythm  ABDOMEN - no organomegaly, soft, nontender, nondistended  EXTREMITIES - no edema, no deformities  MSK - moves limbs equally  NEUROLOGICAL - alert, oriented and no focal signs  PSYCHIATRIC - alert, pleasant and cordial, age-appropriate  IMMUNOLOGIC - no cervical lymphadenopathy     Assessment/Plan   84 yo female here for UTI symptoms  UTI symptoms  UA showed blood, small leukocytes  Urine sent for culture  Start Cipro  RTC as needed

## 2024-01-19 LAB — BACTERIA UR CULT: ABNORMAL

## 2024-01-21 DIAGNOSIS — Z00.00 HEALTH CARE MAINTENANCE: ICD-10-CM

## 2024-01-22 RX ORDER — POTASSIUM CHLORIDE 750 MG/1
10 TABLET, EXTENDED RELEASE ORAL DAILY
Qty: 90 TABLET | Refills: 1 | Status: SHIPPED | OUTPATIENT
Start: 2024-01-22

## 2024-01-28 DIAGNOSIS — I10 BENIGN ESSENTIAL HTN: ICD-10-CM

## 2024-01-28 RX ORDER — DILTIAZEM HYDROCHLORIDE 120 MG/1
120 TABLET, FILM COATED ORAL 2 TIMES DAILY
Qty: 90 TABLET | Refills: 1 | Status: SHIPPED | OUTPATIENT
Start: 2024-01-28 | End: 2024-05-06

## 2024-02-08 ENCOUNTER — APPOINTMENT (OUTPATIENT)
Dept: PRIMARY CARE | Facility: CLINIC | Age: 84
End: 2024-02-08
Payer: MEDICARE

## 2024-02-18 DIAGNOSIS — I48.19 OTHER PERSISTENT ATRIAL FIBRILLATION (MULTI): ICD-10-CM

## 2024-02-19 RX ORDER — METOPROLOL SUCCINATE 25 MG/1
25 TABLET, EXTENDED RELEASE ORAL DAILY
Qty: 30 TABLET | Refills: 6 | Status: SHIPPED | OUTPATIENT
Start: 2024-02-19 | End: 2024-05-30 | Stop reason: HOSPADM

## 2024-03-21 ENCOUNTER — OFFICE VISIT (OUTPATIENT)
Dept: PRIMARY CARE | Facility: CLINIC | Age: 84
End: 2024-03-21
Payer: MEDICARE

## 2024-03-21 VITALS
WEIGHT: 178 LBS | DIASTOLIC BLOOD PRESSURE: 90 MMHG | TEMPERATURE: 97.5 F | SYSTOLIC BLOOD PRESSURE: 140 MMHG | OXYGEN SATURATION: 96 % | HEART RATE: 70 BPM | BODY MASS INDEX: 31.53 KG/M2

## 2024-03-21 DIAGNOSIS — R39.9 UTI SYMPTOMS: ICD-10-CM

## 2024-03-21 LAB
POC APPEARANCE, URINE: CLEAR
POC BILIRUBIN, URINE: NEGATIVE
POC BLOOD, URINE: NEGATIVE
POC COLOR, URINE: YELLOW
POC GLUCOSE, URINE: NEGATIVE MG/DL
POC KETONES, URINE: NEGATIVE MG/DL
POC LEUKOCYTES, URINE: ABNORMAL
POC NITRITE,URINE: NEGATIVE
POC PH, URINE: 5.5 PH
POC PROTEIN, URINE: ABNORMAL MG/DL
POC SPECIFIC GRAVITY, URINE: >=1.03
POC UROBILINOGEN, URINE: 0.2 EU/DL

## 2024-03-21 PROCEDURE — 1160F RVW MEDS BY RX/DR IN RCRD: CPT

## 2024-03-21 PROCEDURE — 1159F MED LIST DOCD IN RCRD: CPT

## 2024-03-21 PROCEDURE — 3077F SYST BP >= 140 MM HG: CPT

## 2024-03-21 PROCEDURE — 1036F TOBACCO NON-USER: CPT

## 2024-03-21 PROCEDURE — 1126F AMNT PAIN NOTED NONE PRSNT: CPT

## 2024-03-21 PROCEDURE — 3080F DIAST BP >= 90 MM HG: CPT

## 2024-03-21 PROCEDURE — 81002 URINALYSIS NONAUTO W/O SCOPE: CPT

## 2024-03-21 PROCEDURE — 99213 OFFICE O/P EST LOW 20 MIN: CPT

## 2024-03-21 RX ORDER — SULFAMETHOXAZOLE AND TRIMETHOPRIM 800; 160 MG/1; MG/1
1 TABLET ORAL 2 TIMES DAILY
Qty: 6 TABLET | Refills: 0 | Status: SHIPPED | OUTPATIENT
Start: 2024-03-21 | End: 2024-03-24

## 2024-03-21 ASSESSMENT — PATIENT HEALTH QUESTIONNAIRE - PHQ9
SUM OF ALL RESPONSES TO PHQ9 QUESTIONS 1 AND 2: 0
2. FEELING DOWN, DEPRESSED OR HOPELESS: NOT AT ALL
1. LITTLE INTEREST OR PLEASURE IN DOING THINGS: NOT AT ALL

## 2024-03-21 ASSESSMENT — PAIN SCALES - GENERAL: PAINLEVEL: 0-NO PAIN

## 2024-03-21 NOTE — PROGRESS NOTES
Subjective   Patient ID: Mercedes Castellanos is a 84 y.o. female who presents for Urinary Frequency (Burning, pain shooting into the arms, legs, and hands since yesterday afternoon/Diarrhea and chills started last night/These are frequent. Recently got off an antibiotic last week for a UTI).    HPI   Mercedes is a Dr. Danielle patient seen with her daughter for c/o dysuria, feelings of incomplete bladder emptying, intermittent chills since yesterday. Also reports diarrhea that started last night. Reports history of recurrent UTIs over the past year, has been treated twice this month. Poor fluid intake. Denies fevers, hematuria, abdominal pain.     Review of Systems  All other systems have been reviewed and are negative except as noted in the HPI.     Objective   /90 (BP Location: Left arm)   Pulse 70   Temp 36.4 °C (97.5 °F) (Temporal)   Wt 80.7 kg (178 lb)   SpO2 96%   BMI 31.53 kg/m²     Physical Exam  Vitals and nursing note reviewed.   Constitutional:       General: She is not in acute distress.  Eyes:      Extraocular Movements: Extraocular movements intact.      Conjunctiva/sclera: Conjunctivae normal.   Cardiovascular:      Rate and Rhythm: Normal rate.   Pulmonary:      Effort: Pulmonary effort is normal.      Breath sounds: Normal breath sounds.   Abdominal:      General: Abdomen is flat. Bowel sounds are normal.      Tenderness: There is no abdominal tenderness. There is no right CVA tenderness or left CVA tenderness.   Musculoskeletal:      Cervical back: Neck supple.   Skin:     General: Skin is warm.   Neurological:      Mental Status: She is alert. Mental status is at baseline.   Psychiatric:         Mood and Affect: Mood normal.       Assessment/Plan   Problem List Items Addressed This Visit    None  Visit Diagnoses         Codes    UTI symptoms      POCT UA +trace leuks.   Bactrim as directed. Risks and benefits of medication discussed and prescribed.   Increased fluid intake.   Referral urology  for recurrent UTIs. May also consider atrophic vaginitis if symptoms continue to persist.  R39.9    Relevant Medications    sulfamethoxazole-trimethoprim (Bactrim DS) 800-160 mg tablet    Other Relevant Orders    POCT UA (nonautomated) manually resulted (Completed)    Referral to Urology

## 2024-05-05 DIAGNOSIS — I10 BENIGN ESSENTIAL HTN: ICD-10-CM

## 2024-05-06 RX ORDER — DILTIAZEM HYDROCHLORIDE 120 MG/1
120 TABLET, FILM COATED ORAL 2 TIMES DAILY
Qty: 90 TABLET | Refills: 1 | Status: SHIPPED | OUTPATIENT
Start: 2024-05-06 | End: 2024-05-30 | Stop reason: HOSPADM

## 2024-05-23 ENCOUNTER — APPOINTMENT (OUTPATIENT)
Dept: RADIOLOGY | Facility: HOSPITAL | Age: 84
DRG: 689 | End: 2024-05-23
Payer: MEDICARE

## 2024-05-23 ENCOUNTER — APPOINTMENT (OUTPATIENT)
Dept: CARDIOLOGY | Facility: HOSPITAL | Age: 84
DRG: 689 | End: 2024-05-23
Payer: MEDICARE

## 2024-05-23 ENCOUNTER — HOSPITAL ENCOUNTER (INPATIENT)
Facility: HOSPITAL | Age: 84
LOS: 3 days | Discharge: HOME | DRG: 689 | End: 2024-05-30
Attending: EMERGENCY MEDICINE | Admitting: HOSPITALIST
Payer: MEDICARE

## 2024-05-23 DIAGNOSIS — I42.0 DILATED CARDIOMYOPATHY (MULTI): ICD-10-CM

## 2024-05-23 DIAGNOSIS — N12 PYELONEPHRITIS: Primary | ICD-10-CM

## 2024-05-23 DIAGNOSIS — G93.40 ENCEPHALOPATHY ACUTE: ICD-10-CM

## 2024-05-23 DIAGNOSIS — I48.91 ATRIAL FIBRILLATION, UNSPECIFIED TYPE (MULTI): ICD-10-CM

## 2024-05-23 DIAGNOSIS — I48.0 PAF (PAROXYSMAL ATRIAL FIBRILLATION) (MULTI): ICD-10-CM

## 2024-05-23 DIAGNOSIS — I10 BENIGN ESSENTIAL HTN: ICD-10-CM

## 2024-05-23 DIAGNOSIS — I95.9 HYPOTENSION, UNSPECIFIED HYPOTENSION TYPE: ICD-10-CM

## 2024-05-23 DIAGNOSIS — N30.00 ACUTE CYSTITIS WITHOUT HEMATURIA: ICD-10-CM

## 2024-05-23 LAB
ALBUMIN SERPL-MCNC: 4 G/DL (ref 3.5–5)
ALP BLD-CCNC: 85 U/L (ref 35–125)
ALT SERPL-CCNC: 9 U/L (ref 5–40)
ANION GAP SERPL CALC-SCNC: 10 MMOL/L
APPEARANCE UR: ABNORMAL
AST SERPL-CCNC: 19 U/L (ref 5–40)
BASOPHILS # BLD AUTO: 0.05 X10*3/UL (ref 0–0.1)
BASOPHILS NFR BLD AUTO: 0.4 %
BILIRUB SERPL-MCNC: 0.5 MG/DL (ref 0.1–1.2)
BILIRUB UR STRIP.AUTO-MCNC: NEGATIVE MG/DL
BUN SERPL-MCNC: 13 MG/DL (ref 8–25)
CALCIUM SERPL-MCNC: 9.6 MG/DL (ref 8.5–10.4)
CHLORIDE SERPL-SCNC: 105 MMOL/L (ref 97–107)
CO2 SERPL-SCNC: 25 MMOL/L (ref 24–31)
COLOR UR: ABNORMAL
CREAT SERPL-MCNC: 0.9 MG/DL (ref 0.4–1.6)
EGFRCR SERPLBLD CKD-EPI 2021: 63 ML/MIN/1.73M*2
EOSINOPHIL # BLD AUTO: 0.04 X10*3/UL (ref 0–0.4)
EOSINOPHIL NFR BLD AUTO: 0.3 %
ERYTHROCYTE [DISTWIDTH] IN BLOOD BY AUTOMATED COUNT: 14.4 % (ref 11.5–14.5)
GLUCOSE BLD MANUAL STRIP-MCNC: 109 MG/DL (ref 74–99)
GLUCOSE SERPL-MCNC: 114 MG/DL (ref 65–99)
GLUCOSE UR STRIP.AUTO-MCNC: NORMAL MG/DL
HCT VFR BLD AUTO: 44.5 % (ref 36–46)
HGB BLD-MCNC: 14.8 G/DL (ref 12–16)
IMM GRANULOCYTES # BLD AUTO: 0.03 X10*3/UL (ref 0–0.5)
IMM GRANULOCYTES NFR BLD AUTO: 0.3 % (ref 0–0.9)
KETONES UR STRIP.AUTO-MCNC: ABNORMAL MG/DL
LEUKOCYTE ESTERASE UR QL STRIP.AUTO: ABNORMAL
LYMPHOCYTES # BLD AUTO: 1.46 X10*3/UL (ref 0.8–3)
LYMPHOCYTES NFR BLD AUTO: 12.6 %
MCH RBC QN AUTO: 29.9 PG (ref 26–34)
MCHC RBC AUTO-ENTMCNC: 33.3 G/DL (ref 32–36)
MCV RBC AUTO: 90 FL (ref 80–100)
MONOCYTES # BLD AUTO: 1 X10*3/UL (ref 0.05–0.8)
MONOCYTES NFR BLD AUTO: 8.7 %
MUCOUS THREADS #/AREA URNS AUTO: ABNORMAL /LPF
NEUTROPHILS # BLD AUTO: 8.98 X10*3/UL (ref 1.6–5.5)
NEUTROPHILS NFR BLD AUTO: 77.7 %
NITRITE UR QL STRIP.AUTO: NEGATIVE
NRBC BLD-RTO: 0 /100 WBCS (ref 0–0)
PH UR STRIP.AUTO: 6 [PH]
PLATELET # BLD AUTO: 270 X10*3/UL (ref 150–450)
POTASSIUM SERPL-SCNC: 4 MMOL/L (ref 3.4–5.1)
PROT SERPL-MCNC: 7.8 G/DL (ref 5.9–7.9)
PROT UR STRIP.AUTO-MCNC: ABNORMAL MG/DL
RBC # BLD AUTO: 4.95 X10*6/UL (ref 4–5.2)
RBC # UR STRIP.AUTO: ABNORMAL /UL
RBC #/AREA URNS AUTO: >20 /HPF
SODIUM SERPL-SCNC: 140 MMOL/L (ref 133–145)
SP GR UR STRIP.AUTO: 1.02
SQUAMOUS #/AREA URNS AUTO: ABNORMAL /HPF
UROBILINOGEN UR STRIP.AUTO-MCNC: NORMAL MG/DL
WBC # BLD AUTO: 11.6 X10*3/UL (ref 4.4–11.3)
WBC #/AREA URNS AUTO: >50 /HPF

## 2024-05-23 PROCEDURE — G0378 HOSPITAL OBSERVATION PER HR: HCPCS

## 2024-05-23 PROCEDURE — 2500000004 HC RX 250 GENERAL PHARMACY W/ HCPCS (ALT 636 FOR OP/ED): Performed by: INTERNAL MEDICINE

## 2024-05-23 PROCEDURE — 85025 COMPLETE CBC W/AUTO DIFF WBC: CPT | Performed by: EMERGENCY MEDICINE

## 2024-05-23 PROCEDURE — 82947 ASSAY GLUCOSE BLOOD QUANT: CPT

## 2024-05-23 PROCEDURE — 87086 URINE CULTURE/COLONY COUNT: CPT | Mod: WESLAB | Performed by: EMERGENCY MEDICINE

## 2024-05-23 PROCEDURE — 96365 THER/PROPH/DIAG IV INF INIT: CPT

## 2024-05-23 PROCEDURE — 93005 ELECTROCARDIOGRAM TRACING: CPT

## 2024-05-23 PROCEDURE — 96361 HYDRATE IV INFUSION ADD-ON: CPT

## 2024-05-23 PROCEDURE — 96367 TX/PROPH/DG ADDL SEQ IV INF: CPT

## 2024-05-23 PROCEDURE — 70450 CT HEAD/BRAIN W/O DYE: CPT

## 2024-05-23 PROCEDURE — 74176 CT ABD & PELVIS W/O CONTRAST: CPT

## 2024-05-23 PROCEDURE — 81001 URINALYSIS AUTO W/SCOPE: CPT | Performed by: EMERGENCY MEDICINE

## 2024-05-23 PROCEDURE — 80053 COMPREHEN METABOLIC PANEL: CPT | Performed by: EMERGENCY MEDICINE

## 2024-05-23 PROCEDURE — 71045 X-RAY EXAM CHEST 1 VIEW: CPT | Performed by: RADIOLOGY

## 2024-05-23 PROCEDURE — 71045 X-RAY EXAM CHEST 1 VIEW: CPT

## 2024-05-23 PROCEDURE — 74176 CT ABD & PELVIS W/O CONTRAST: CPT | Performed by: RADIOLOGY

## 2024-05-23 PROCEDURE — 2500000001 HC RX 250 WO HCPCS SELF ADMINISTERED DRUGS (ALT 637 FOR MEDICARE OP): Performed by: INTERNAL MEDICINE

## 2024-05-23 PROCEDURE — 36415 COLL VENOUS BLD VENIPUNCTURE: CPT | Performed by: EMERGENCY MEDICINE

## 2024-05-23 PROCEDURE — 70450 CT HEAD/BRAIN W/O DYE: CPT | Performed by: RADIOLOGY

## 2024-05-23 PROCEDURE — 96366 THER/PROPH/DIAG IV INF ADDON: CPT

## 2024-05-23 PROCEDURE — 99285 EMERGENCY DEPT VISIT HI MDM: CPT | Mod: 25

## 2024-05-23 RX ORDER — POTASSIUM CHLORIDE 750 MG/1
10 TABLET, FILM COATED, EXTENDED RELEASE ORAL DAILY
Status: DISCONTINUED | OUTPATIENT
Start: 2024-05-24 | End: 2024-05-30 | Stop reason: HOSPADM

## 2024-05-23 RX ORDER — ONDANSETRON 4 MG/1
4 TABLET, FILM COATED ORAL EVERY 8 HOURS PRN
Status: DISCONTINUED | OUTPATIENT
Start: 2024-05-23 | End: 2024-05-30 | Stop reason: HOSPADM

## 2024-05-23 RX ORDER — LANOLIN ALCOHOL/MO/W.PET/CERES
400 CREAM (GRAM) TOPICAL DAILY
Status: DISCONTINUED | OUTPATIENT
Start: 2024-05-24 | End: 2024-05-30 | Stop reason: HOSPADM

## 2024-05-23 RX ORDER — POLYETHYLENE GLYCOL 3350 17 G/17G
17 POWDER, FOR SOLUTION ORAL DAILY PRN
Status: DISCONTINUED | OUTPATIENT
Start: 2024-05-23 | End: 2024-05-30 | Stop reason: HOSPADM

## 2024-05-23 RX ORDER — GUAIFENESIN/DEXTROMETHORPHAN 100-10MG/5
5 SYRUP ORAL EVERY 4 HOURS PRN
Status: DISCONTINUED | OUTPATIENT
Start: 2024-05-23 | End: 2024-05-30 | Stop reason: HOSPADM

## 2024-05-23 RX ORDER — PANTOPRAZOLE SODIUM 40 MG/1
40 TABLET, DELAYED RELEASE ORAL DAILY
Status: DISCONTINUED | OUTPATIENT
Start: 2024-05-24 | End: 2024-05-30 | Stop reason: HOSPADM

## 2024-05-23 RX ORDER — ONDANSETRON HYDROCHLORIDE 2 MG/ML
4 INJECTION, SOLUTION INTRAVENOUS EVERY 8 HOURS PRN
Status: DISCONTINUED | OUTPATIENT
Start: 2024-05-23 | End: 2024-05-30 | Stop reason: HOSPADM

## 2024-05-23 RX ORDER — GUAIFENESIN 600 MG/1
600 TABLET, EXTENDED RELEASE ORAL EVERY 12 HOURS PRN
Status: DISCONTINUED | OUTPATIENT
Start: 2024-05-23 | End: 2024-05-30 | Stop reason: HOSPADM

## 2024-05-23 RX ORDER — DILTIAZEM HYDROCHLORIDE 60 MG/1
120 TABLET, FILM COATED ORAL EVERY 12 HOURS
Status: DISCONTINUED | OUTPATIENT
Start: 2024-05-23 | End: 2024-05-28

## 2024-05-23 RX ORDER — METOPROLOL SUCCINATE 25 MG/1
25 TABLET, EXTENDED RELEASE ORAL DAILY
Status: DISCONTINUED | OUTPATIENT
Start: 2024-05-24 | End: 2024-05-27

## 2024-05-23 RX ORDER — CEFTRIAXONE 1 G/50ML
1 INJECTION, SOLUTION INTRAVENOUS EVERY 24 HOURS
Status: DISCONTINUED | OUTPATIENT
Start: 2024-05-23 | End: 2024-05-25

## 2024-05-23 RX ORDER — SODIUM CHLORIDE 9 MG/ML
50 INJECTION, SOLUTION INTRAVENOUS CONTINUOUS
Status: ACTIVE | OUTPATIENT
Start: 2024-05-23 | End: 2024-05-24

## 2024-05-23 RX ORDER — ESCITALOPRAM OXALATE 10 MG/1
10 TABLET ORAL DAILY
Status: DISCONTINUED | OUTPATIENT
Start: 2024-05-24 | End: 2024-05-30 | Stop reason: HOSPADM

## 2024-05-23 RX ADMIN — DOXYCYCLINE 100 MG: 100 INJECTION, POWDER, LYOPHILIZED, FOR SOLUTION INTRAVENOUS at 23:25

## 2024-05-23 RX ADMIN — APIXABAN 5 MG: 5 TABLET, FILM COATED ORAL at 22:39

## 2024-05-23 RX ADMIN — SODIUM CHLORIDE 50 ML/HR: 900 INJECTION, SOLUTION INTRAVENOUS at 22:39

## 2024-05-23 RX ADMIN — CEFTRIAXONE SODIUM 1 G: 1 INJECTION, SOLUTION INTRAVENOUS at 22:38

## 2024-05-23 RX ADMIN — DILTIAZEM HYDROCHLORIDE 120 MG: 60 TABLET ORAL at 22:38

## 2024-05-23 ASSESSMENT — LIFESTYLE VARIABLES
TOTAL SCORE: 0
HAVE PEOPLE ANNOYED YOU BY CRITICIZING YOUR DRINKING: NO
EVER FELT BAD OR GUILTY ABOUT YOUR DRINKING: NO
HAVE YOU EVER FELT YOU SHOULD CUT DOWN ON YOUR DRINKING: NO
EVER HAD A DRINK FIRST THING IN THE MORNING TO STEADY YOUR NERVES TO GET RID OF A HANGOVER: NO

## 2024-05-23 ASSESSMENT — PAIN - FUNCTIONAL ASSESSMENT: PAIN_FUNCTIONAL_ASSESSMENT: 0-10

## 2024-05-23 ASSESSMENT — PAIN SCALES - GENERAL: PAINLEVEL_OUTOF10: 0 - NO PAIN

## 2024-05-23 NOTE — ED PROVIDER NOTES
HPI   Chief Complaint   Patient presents with    Altered Mental Status     Presents to ED from  for confusion and back pain for the past 2 days with concern for a kidney infection.         History provided by:  Relative and patient (Son)  History limited by:  Mental status change    84-year-old female brought to the emergency department after having dysuria and left flank pain and presented to an urgent care who were concerned that she may have pyelonephritis or kidney stone    She denies any fevers.  She is a somewhat poor historian as she has a history of some at least mild dementia but family says that she has been more confused.  She has had some left flank pain and vomiting                  [place] Coma Scale Score: 14                     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/09/2022    Combined form of age-related cataract, both eyes    Disorder of the skin and subcutaneous tissue, unspecified 01/12/2017    Skin lesion    Dorsalgia, unspecified 12/09/2013    Pain, upper back    Dyskinesia of esophagus 09/16/2019    Esophageal spasm    Elevated blood-pressure reading, without diagnosis of hypertension 04/08/2019    Elevated blood pressure reading    Encounter for screening for lipoid disorders 04/18/2019    Encounter for lipid screening for cardiovascular disease    Essential (primary) hypertension 09/10/2018    Benign essential hypertension    Irritable bowel syndrome with diarrhea 11/07/2019    Irritable bowel syndrome with diarrhea    Lumbago with sciatica, left side 04/24/2019    Chronic bilateral low back pain with bilateral sciatica    Mastodynia 09/26/2019    Breast tenderness in female    Menopausal and female climacteric states  08/19/2019    Female climacteric state    Other conditions influencing health status 08/30/2018    History of burning on urination    Other conditions influencing health status 09/09/2013    Foot pain, unspecified laterality    Other conditions influencing health status     Colonoscopy (Fiberoptic)    Other conditions influencing health status     Mammogram    Other dysphagia 11/07/2019    Esophageal dysphagia    Other dysphagia 12/10/2019    Esophageal dysphagia    Other specified soft tissue disorders 06/26/2019    Left leg swelling    Pain in right hip 11/13/2019    Right hip pain    Pain in right shoulder 06/26/2019    Chronic right shoulder pain    Pain in thoracic spine 08/26/2020    Chronic bilateral thoracic back pain    Personal history of other diseases of the circulatory system     History of hypertension    Personal history of other diseases of the digestive system 10/03/2019    History of gastroesophageal reflux (GERD)    Personal history of other diseases of the musculoskeletal system and connective tissue 12/15/2020    History of low back pain    Personal history of other drug therapy 10/12/2016    History of influenza vaccination    Personal history of other drug therapy 02/09/2021    History of pneumococcal vaccination    Personal history of other endocrine, nutritional and metabolic disease 09/10/2018    History of vitamin D deficiency    Personal history of other medical treatment 07/20/2017    History of screening mammography    Personal history of other mental and behavioral disorders 08/19/2019    History of anxiety    Personal history of other specified conditions 09/10/2018    History of fatigue    Personal history of other specified conditions 05/01/2019    History of abnormal mammogram    Personal history of other specified conditions 10/19/2017    History of precordial chest pain    Personal history of other specified conditions 07/07/2020    History of dizziness    Personal history of  other specified conditions 08/30/2018    History of painful urination    Personal history of other specified conditions 09/23/2018    History of diarrhea    Personal history of urinary (tract) infections 09/10/2018    History of urinary tract infection    Primary osteoarthritis, unspecified hand 07/07/2020    Hand arthritis    Sacrococcygeal disorders, not elsewhere classified 12/15/2020    Sacroiliac joint dysfunction of right side    Trigger finger, unspecified finger 10/12/2016    Trigger finger, acquired    Unspecified atrial fibrillation (Multi) 10/08/2018    New onset a-fib    Unsteadiness on feet 03/11/2020    Unsteadiness on feet    Vitreous degeneration, bilateral 08/26/2019    Degeneration of posterior vitreous body of both eyes     Past Surgical History:   Procedure Laterality Date    ESOPHAGOGASTRODUODENOSCOPY  03/11/2014    Diagnostic Esophagogastroduodenoscopy    GASTRIC FUNDOPLICATION  05/27/2014    Esophagogastric Fundoplasty Nissen Fundoplication    OTHER SURGICAL HISTORY  05/26/2022    Rotator cuff repair    OTHER SURGICAL HISTORY  05/26/2022    Appendectomy    OTHER SURGICAL HISTORY  05/26/2022    Partial hysterectomy     Family History   Problem Relation Name Age of Onset    Diabetes Father      Coronary artery disease Sister      Coronary artery disease Brother      Glaucoma Brother      Coronary artery disease Other Family Hx     Ovarian cancer Sibling       Social History     Tobacco Use    Smoking status: Never    Smokeless tobacco: Never   Vaping Use    Vaping status: Never Used   Substance Use Topics    Alcohol use: Not Currently    Drug use: Never       Physical Exam   ED Triage Vitals [05/23/24 1839]   Temperature Heart Rate Respirations BP   36.4 °C (97.5 °F) 80 18 (!) 139/92      Pulse Ox Temp Source Heart Rate Source Patient Position   97 % Tympanic Monitor --      BP Location FiO2 (%)     -- --       Physical Exam  Vitals and nursing note reviewed.   Constitutional:       General: She  is not in acute distress.     Appearance: She is well-developed.      Comments: 84-year-old pleasant white female sitting quietly and speaking clearly  She was smiling and interacting with her son.  She is not toxic   HENT:      Head: Normocephalic and atraumatic.   Eyes:      Conjunctiva/sclera: Conjunctivae normal.   Cardiovascular:      Rate and Rhythm: Normal rate and regular rhythm.      Heart sounds: No murmur heard.  Pulmonary:      Effort: Pulmonary effort is normal. No respiratory distress.      Breath sounds: Normal breath sounds.   Abdominal:      Palpations: Abdomen is soft.      Tenderness: There is no abdominal tenderness.   Musculoskeletal:         General: No swelling.      Cervical back: Neck supple.        Back:    Skin:     General: Skin is warm and dry.      Capillary Refill: Capillary refill takes less than 2 seconds.   Neurological:      Mental Status: She is alert.   Psychiatric:         Mood and Affect: Mood normal.         ED Course & MDM   ED Course as of 05/23/24 2243   Thu May 23, 2024   1954 Atrial fibrillation with a rate of 93.  No evidence of ischemia.  Previous notes state that she has a history of A-fib  Compared to EKG of October 19, 2017 [JN]      ED Course User Index  [JN] Gabriel West MD         Diagnoses as of 05/23/24 2243   Pyelonephritis       Medical Decision Making    84-year-old female brought to the emergency department after having dysuria and left flank pain and presented to an urgent care who were concerned that she may have pyelonephritis or kidney stone  She denies any fevers.  She is a somewhat poor historian as she has a history of some at least mild dementia but family says that she has been more confused.  She has had some left flank pain and vomiting       Physical Exam  Vitals and nursing note reviewed.   Constitutional:       General: She is not in acute distress.     Appearance: She is well-developed.      Comments: 84-year-old pleasant white  female sitting quietly and speaking clearly  She was smiling and interacting with her son.  She is not toxic   HENT:      Head: Normocephalic and atraumatic.   Eyes:      Conjunctiva/sclera: Conjunctivae normal.   Cardiovascular:      Rate and Rhythm: Normal rate and regular rhythm.      Heart sounds: No murmur heard.  Pulmonary:      Effort: Pulmonary effort is normal. No respiratory distress.      Breath sounds: Normal breath sounds.   Abdominal:      Palpations: Abdomen is soft.      Tenderness: There is no abdominal tenderness.   Musculoskeletal:         General: No swelling.      Cervical back: Neck supple.        Back:        Differential diagnosis include acute appendicitis, bowel perforation, acute cholecystitis, ruptured diverticulitis, incarcerated hernia, acute pancreatitis, kidney stone, pyelonephritis    Update 10:41 PM  Urinalysis demonstrates positive leukocytes  Greater than 50 white blood cells  Patient has a serum leukocytosis of 11.6  CMP is unremarkable  CT scan does not show any evidence of a stone or pyelonephritis    Patient is started on IV Rocephin and will be admitted to the hospital for further treatment and evaluation.  Dr. Medley of the hospitalist team has graciously accepted her onto their service    CT head wo IV contrast   Final Result   No acute intracranial hemorrhage or mass effect.        Nonspecific white matter changes and parenchymal loss.        MACRO:   None.        Signed by: Nuha Pulido 5/23/2024 8:54 PM   Dictation workstation:   [id number]      CT abdomen pelvis wo IV contrast   Final Result   Under distention of the urinary bladder limits evaluation however   correlate for cystitis suggested. No evidence of obstructive   nephropathy identified.        Diverticulosis, moderate hiatal hernia, trees pericardial effusion   and additional findings as detailed.        MACRO:   None        Signed by: Nuha Pulido 5/23/2024 9:02 PM   Dictation workstation:   [id number]       XR chest 1 view   Final Result   Cardiomegaly with small hiatal hernia. No other acute intrathoracic   abnormality.        Signed by: Emiliano Bonilla 5/23/2024 8:02 PM   Dictation workstation:   [id number]          Labs Reviewed   CBC WITH AUTO DIFFERENTIAL - Abnormal       Result Value    WBC 11.6 (*)     nRBC 0.0      RBC 4.95      Hemoglobin 14.8      Hematocrit 44.5      MCV 90      MCH 29.9      MCHC 33.3      RDW 14.4      Platelets 270      Neutrophils % 77.7      Immature Granulocytes %, Automated 0.3      Lymphocytes % 12.6      Monocytes % 8.7      Eosinophils % 0.3      Basophils % 0.4      Neutrophils Absolute 8.98 (*)     Immature Granulocytes Absolute, Automated 0.03      Lymphocytes Absolute 1.46      Monocytes Absolute 1.00 (*)     Eosinophils Absolute 0.04      Basophils Absolute 0.05     COMPREHENSIVE METABOLIC PANEL - Abnormal    Glucose 114 (*)     Sodium 140      Potassium 4.0      Chloride 105      Bicarbonate 25      Urea Nitrogen 13      Creatinine 0.90      eGFR 63      Calcium 9.6      Albumin 4.0      Alkaline Phosphatase 85      Total Protein 7.8      AST 19      Bilirubin, Total 0.5      ALT 9      Anion Gap 10     URINALYSIS WITH REFLEX CULTURE AND MICROSCOPIC - Abnormal    Color, Urine Dark-Yellow      Appearance, Urine Ex.Turbid (*)     Specific Gravity, Urine 1.023      pH, Urine 6.0      Protein, Urine 70 (1+) (*)     Glucose, Urine Normal      Blood, Urine 0.2 (2+) (*)     Ketones, Urine TRACE (*)     Bilirubin, Urine NEGATIVE      Urobilinogen, Urine Normal      Nitrite, Urine NEGATIVE      Leukocyte Esterase, Urine 500 Anna/µL (*)    MICROSCOPIC ONLY, URINE - Abnormal    WBC, Urine >50 (*)     RBC, Urine >20 (*)     Squamous Epithelial Cells, Urine 1-9 (SPARSE)      Mucus, Urine 4+     POCT GLUCOSE - Abnormal    POCT Glucose 109 (*)    URINE CULTURE   URINALYSIS WITH REFLEX CULTURE AND MICROSCOPIC    Narrative:     The following orders were created for panel order Urinalysis  with Reflex Culture and Microscopic.  Procedure                               Abnormality         Status                     ---------                               -----------         ------                     Urinalysis with Reflex C...[[id number]]  Abnormal            Final result               Extra Urine Gray Tube[[id number]]                            In process                   Please view results for these tests on the individual orders.   EXTRA URINE GRAY TUBE   TSH WITH REFLEX TO FREE T4 IF ABNORMAL   CBC WITH AUTO DIFFERENTIAL   BASIC METABOLIC PANEL   POCT GLUCOSE METER         Procedure  Procedures     Gabriel West MD  05/23/24 7817

## 2024-05-24 LAB
ANION GAP SERPL CALC-SCNC: 11 MMOL/L
BASOPHILS # BLD AUTO: 0.05 X10*3/UL (ref 0–0.1)
BASOPHILS NFR BLD AUTO: 0.6 %
BUN SERPL-MCNC: 15 MG/DL (ref 8–25)
CALCIUM SERPL-MCNC: 9.5 MG/DL (ref 8.5–10.4)
CHLORIDE SERPL-SCNC: 106 MMOL/L (ref 97–107)
CO2 SERPL-SCNC: 24 MMOL/L (ref 24–31)
CREAT SERPL-MCNC: 0.8 MG/DL (ref 0.4–1.6)
EGFRCR SERPLBLD CKD-EPI 2021: 73 ML/MIN/1.73M*2
EOSINOPHIL # BLD AUTO: 0.15 X10*3/UL (ref 0–0.4)
EOSINOPHIL NFR BLD AUTO: 1.8 %
ERYTHROCYTE [DISTWIDTH] IN BLOOD BY AUTOMATED COUNT: 14.5 % (ref 11.5–14.5)
GLUCOSE SERPL-MCNC: 104 MG/DL (ref 65–99)
HCT VFR BLD AUTO: 41 % (ref 36–46)
HGB BLD-MCNC: 13.7 G/DL (ref 12–16)
HOLD SPECIMEN: NORMAL
IMM GRANULOCYTES # BLD AUTO: 0.01 X10*3/UL (ref 0–0.5)
IMM GRANULOCYTES NFR BLD AUTO: 0.1 % (ref 0–0.9)
LYMPHOCYTES # BLD AUTO: 2.15 X10*3/UL (ref 0.8–3)
LYMPHOCYTES NFR BLD AUTO: 25.4 %
MCH RBC QN AUTO: 30.1 PG (ref 26–34)
MCHC RBC AUTO-ENTMCNC: 33.4 G/DL (ref 32–36)
MCV RBC AUTO: 90 FL (ref 80–100)
MONOCYTES # BLD AUTO: 1.15 X10*3/UL (ref 0.05–0.8)
MONOCYTES NFR BLD AUTO: 13.6 %
NEUTROPHILS # BLD AUTO: 4.97 X10*3/UL (ref 1.6–5.5)
NEUTROPHILS NFR BLD AUTO: 58.5 %
NRBC BLD-RTO: 0 /100 WBCS (ref 0–0)
PLATELET # BLD AUTO: 241 X10*3/UL (ref 150–450)
POTASSIUM SERPL-SCNC: 3.6 MMOL/L (ref 3.4–5.1)
RBC # BLD AUTO: 4.55 X10*6/UL (ref 4–5.2)
SODIUM SERPL-SCNC: 141 MMOL/L (ref 133–145)
TSH SERPL DL<=0.05 MIU/L-ACNC: 2.67 MIU/L (ref 0.27–4.2)
WBC # BLD AUTO: 8.5 X10*3/UL (ref 4.4–11.3)

## 2024-05-24 PROCEDURE — 80048 BASIC METABOLIC PNL TOTAL CA: CPT | Performed by: INTERNAL MEDICINE

## 2024-05-24 PROCEDURE — 84443 ASSAY THYROID STIM HORMONE: CPT | Performed by: INTERNAL MEDICINE

## 2024-05-24 PROCEDURE — 36415 COLL VENOUS BLD VENIPUNCTURE: CPT | Performed by: INTERNAL MEDICINE

## 2024-05-24 PROCEDURE — 2500000001 HC RX 250 WO HCPCS SELF ADMINISTERED DRUGS (ALT 637 FOR MEDICARE OP): Performed by: INTERNAL MEDICINE

## 2024-05-24 PROCEDURE — 2500000004 HC RX 250 GENERAL PHARMACY W/ HCPCS (ALT 636 FOR OP/ED): Performed by: INTERNAL MEDICINE

## 2024-05-24 PROCEDURE — 2500000002 HC RX 250 W HCPCS SELF ADMINISTERED DRUGS (ALT 637 FOR MEDICARE OP, ALT 636 FOR OP/ED): Mod: MUE | Performed by: INTERNAL MEDICINE

## 2024-05-24 PROCEDURE — 85025 COMPLETE CBC W/AUTO DIFF WBC: CPT | Performed by: INTERNAL MEDICINE

## 2024-05-24 PROCEDURE — 97161 PT EVAL LOW COMPLEX 20 MIN: CPT | Mod: GP

## 2024-05-24 PROCEDURE — G0378 HOSPITAL OBSERVATION PER HR: HCPCS

## 2024-05-24 PROCEDURE — 2500000005 HC RX 250 GENERAL PHARMACY W/O HCPCS: Performed by: INTERNAL MEDICINE

## 2024-05-24 PROCEDURE — 97165 OT EVAL LOW COMPLEX 30 MIN: CPT | Mod: GO

## 2024-05-24 RX ORDER — ACETAMINOPHEN 325 MG/1
650 TABLET ORAL EVERY 4 HOURS PRN
Status: DISCONTINUED | OUTPATIENT
Start: 2024-05-24 | End: 2024-05-30 | Stop reason: HOSPADM

## 2024-05-24 RX ORDER — SODIUM CHLORIDE 9 MG/ML
75 INJECTION, SOLUTION INTRAVENOUS CONTINUOUS
Status: DISCONTINUED | OUTPATIENT
Start: 2024-05-24 | End: 2024-05-25

## 2024-05-24 RX ORDER — LIDOCAINE 560 MG/1
1 PATCH PERCUTANEOUS; TOPICAL; TRANSDERMAL DAILY
Status: DISCONTINUED | OUTPATIENT
Start: 2024-05-24 | End: 2024-05-30 | Stop reason: HOSPADM

## 2024-05-24 RX ADMIN — DILTIAZEM HYDROCHLORIDE 120 MG: 60 TABLET ORAL at 10:11

## 2024-05-24 RX ADMIN — DILTIAZEM HYDROCHLORIDE 120 MG: 60 TABLET ORAL at 21:27

## 2024-05-24 RX ADMIN — ESCITALOPRAM OXALATE 10 MG: 10 TABLET ORAL at 10:12

## 2024-05-24 RX ADMIN — DOXYCYCLINE 100 MG: 100 INJECTION, POWDER, LYOPHILIZED, FOR SOLUTION INTRAVENOUS at 22:17

## 2024-05-24 RX ADMIN — METOPROLOL SUCCINATE 25 MG: 25 TABLET, EXTENDED RELEASE ORAL at 10:12

## 2024-05-24 RX ADMIN — PANTOPRAZOLE SODIUM 40 MG: 40 TABLET, DELAYED RELEASE ORAL at 10:12

## 2024-05-24 RX ADMIN — CEFTRIAXONE SODIUM 1 G: 1 INJECTION, SOLUTION INTRAVENOUS at 22:17

## 2024-05-24 RX ADMIN — POTASSIUM CHLORIDE 10 MEQ: 750 TABLET, EXTENDED RELEASE ORAL at 10:12

## 2024-05-24 RX ADMIN — ACETAMINOPHEN 650 MG: 325 TABLET ORAL at 21:35

## 2024-05-24 RX ADMIN — Medication 400 MG: at 10:11

## 2024-05-24 RX ADMIN — APIXABAN 5 MG: 5 TABLET, FILM COATED ORAL at 21:27

## 2024-05-24 RX ADMIN — SODIUM CHLORIDE 75 ML/HR: 900 INJECTION, SOLUTION INTRAVENOUS at 14:27

## 2024-05-24 RX ADMIN — APIXABAN 5 MG: 5 TABLET, FILM COATED ORAL at 10:11

## 2024-05-24 RX ADMIN — LIDOCAINE 1 PATCH: 4 PATCH TOPICAL at 14:27

## 2024-05-24 RX ADMIN — ASCORBIC ACID, THIAMINE MONONITRATE,RIBOFLAVIN, NIACINAMIDE, PYRIDOXINE HYDROCHLORIDE, FOLIC ACID, CYANOCOBALAMIN, BIOTIN, CALCIUM PANTOTHENATE, 1 CAPSULE: 100; 1.5; 1.7; 20; 10; 1; 6000; 150000; 5 CAPSULE, LIQUID FILLED ORAL at 10:12

## 2024-05-24 SDOH — SOCIAL STABILITY: SOCIAL INSECURITY
WITHIN THE LAST YEAR, HAVE TO BEEN RAPED OR FORCED TO HAVE ANY KIND OF SEXUAL ACTIVITY BY YOUR PARTNER OR EX-PARTNER?: NO

## 2024-05-24 SDOH — SOCIAL STABILITY: SOCIAL NETWORK
DO YOU BELONG TO ANY CLUBS OR ORGANIZATIONS SUCH AS CHURCH GROUPS UNIONS, FRATERNAL OR ATHLETIC GROUPS, OR SCHOOL GROUPS?: NO

## 2024-05-24 SDOH — SOCIAL STABILITY: SOCIAL NETWORK: HOW OFTEN DO YOU GET TOGETHER WITH FRIENDS OR RELATIVES?: ONCE A WEEK

## 2024-05-24 SDOH — SOCIAL STABILITY: SOCIAL INSECURITY: WITHIN THE LAST YEAR, HAVE YOU BEEN AFRAID OF YOUR PARTNER OR EX-PARTNER?: NO

## 2024-05-24 SDOH — HEALTH STABILITY: MENTAL HEALTH: HOW MANY STANDARD DRINKS CONTAINING ALCOHOL DO YOU HAVE ON A TYPICAL DAY?: PATIENT DOES NOT DRINK

## 2024-05-24 SDOH — SOCIAL STABILITY: SOCIAL NETWORK: HOW OFTEN DO YOU ATTEND CHURCH OR RELIGIOUS SERVICES?: 1 TO 4 TIMES PER YEAR

## 2024-05-24 SDOH — SOCIAL STABILITY: SOCIAL INSECURITY: DO YOU FEEL UNSAFE GOING BACK TO THE PLACE WHERE YOU ARE LIVING?: NO

## 2024-05-24 SDOH — SOCIAL STABILITY: SOCIAL INSECURITY: ARE THERE ANY APPARENT SIGNS OF INJURIES/BEHAVIORS THAT COULD BE RELATED TO ABUSE/NEGLECT?: NO

## 2024-05-24 SDOH — SOCIAL STABILITY: SOCIAL INSECURITY: DO YOU FEEL ANYONE HAS EXPLOITED OR TAKEN ADVANTAGE OF YOU FINANCIALLY OR OF YOUR PERSONAL PROPERTY?: NO

## 2024-05-24 SDOH — ECONOMIC STABILITY: TRANSPORTATION INSECURITY
IN THE PAST 12 MONTHS, HAS LACK OF TRANSPORTATION KEPT YOU FROM MEETINGS, WORK, OR FROM GETTING THINGS NEEDED FOR DAILY LIVING?: NO

## 2024-05-24 SDOH — SOCIAL STABILITY: SOCIAL INSECURITY
WITHIN THE LAST YEAR, HAVE YOU BEEN KICKED, HIT, SLAPPED, OR OTHERWISE PHYSICALLY HURT BY YOUR PARTNER OR EX-PARTNER?: NO

## 2024-05-24 SDOH — SOCIAL STABILITY: SOCIAL NETWORK: ARE YOU MARRIED, WIDOWED, DIVORCED, SEPARATED, NEVER MARRIED, OR LIVING WITH A PARTNER?: WIDOWED

## 2024-05-24 SDOH — SOCIAL STABILITY: SOCIAL NETWORK: HOW OFTEN DO YOU ATTENT MEETINGS OF THE CLUB OR ORGANIZATION YOU BELONG TO?: 1 TO 4 TIMES PER YEAR

## 2024-05-24 SDOH — HEALTH STABILITY: MENTAL HEALTH: HOW OFTEN DO YOU HAVE A DRINK CONTAINING ALCOHOL?: NEVER

## 2024-05-24 SDOH — ECONOMIC STABILITY: TRANSPORTATION INSECURITY
IN THE PAST 12 MONTHS, HAS THE LACK OF TRANSPORTATION KEPT YOU FROM MEDICAL APPOINTMENTS OR FROM GETTING MEDICATIONS?: NO

## 2024-05-24 SDOH — ECONOMIC STABILITY: INCOME INSECURITY: IN THE LAST 12 MONTHS, WAS THERE A TIME WHEN YOU WERE NOT ABLE TO PAY THE MORTGAGE OR RENT ON TIME?: NO

## 2024-05-24 SDOH — HEALTH STABILITY: MENTAL HEALTH
HOW OFTEN DO YOU NEED TO HAVE SOMEONE HELP YOU WHEN YOU READ INSTRUCTIONS, PAMPHLETS, OR OTHER WRITTEN MATERIAL FROM YOUR DOCTOR OR PHARMACY?: NEVER

## 2024-05-24 SDOH — SOCIAL STABILITY: SOCIAL INSECURITY: WITHIN THE LAST YEAR, HAVE YOU BEEN HUMILIATED OR EMOTIONALLY ABUSED IN OTHER WAYS BY YOUR PARTNER OR EX-PARTNER?: NO

## 2024-05-24 SDOH — HEALTH STABILITY: MENTAL HEALTH: HOW OFTEN DO YOU HAVE 6 OR MORE DRINKS ON ONE OCCASION?: NEVER

## 2024-05-24 SDOH — ECONOMIC STABILITY: INCOME INSECURITY: HOW HARD IS IT FOR YOU TO PAY FOR THE VERY BASICS LIKE FOOD, HOUSING, MEDICAL CARE, AND HEATING?: NOT VERY HARD

## 2024-05-24 SDOH — SOCIAL STABILITY: SOCIAL INSECURITY: HAVE YOU HAD ANY THOUGHTS OF HARMING ANYONE ELSE?: NO

## 2024-05-24 SDOH — ECONOMIC STABILITY: INCOME INSECURITY: IN THE PAST 12 MONTHS, HAS THE ELECTRIC, GAS, OIL, OR WATER COMPANY THREATENED TO SHUT OFF SERVICE IN YOUR HOME?: NO

## 2024-05-24 SDOH — ECONOMIC STABILITY: FOOD INSECURITY: WITHIN THE PAST 12 MONTHS, THE FOOD YOU BOUGHT JUST DIDN'T LAST AND YOU DIDN'T HAVE MONEY TO GET MORE.: NEVER TRUE

## 2024-05-24 SDOH — HEALTH STABILITY: MENTAL HEALTH
STRESS IS WHEN SOMEONE FEELS TENSE, NERVOUS, ANXIOUS, OR CAN'T SLEEP AT NIGHT BECAUSE THEIR MIND IS TROUBLED. HOW STRESSED ARE YOU?: ONLY A LITTLE

## 2024-05-24 SDOH — SOCIAL STABILITY: SOCIAL INSECURITY: ABUSE: ADULT

## 2024-05-24 SDOH — HEALTH STABILITY: PHYSICAL HEALTH: ON AVERAGE, HOW MANY DAYS PER WEEK DO YOU ENGAGE IN MODERATE TO STRENUOUS EXERCISE (LIKE A BRISK WALK)?: 0 DAYS

## 2024-05-24 SDOH — ECONOMIC STABILITY: HOUSING INSECURITY
IN THE LAST 12 MONTHS, WAS THERE A TIME WHEN YOU DID NOT HAVE A STEADY PLACE TO SLEEP OR SLEPT IN A SHELTER (INCLUDING NOW)?: YES

## 2024-05-24 SDOH — SOCIAL STABILITY: SOCIAL INSECURITY: WERE YOU ABLE TO COMPLETE ALL THE BEHAVIORAL HEALTH SCREENINGS?: YES

## 2024-05-24 SDOH — ECONOMIC STABILITY: HOUSING INSECURITY
IN THE LAST 12 MONTHS, WAS THERE A TIME WHEN YOU DID NOT HAVE A STEADY PLACE TO SLEEP OR SLEPT IN A SHELTER (INCLUDING NOW)?: NO

## 2024-05-24 SDOH — ECONOMIC STABILITY: FOOD INSECURITY: WITHIN THE PAST 12 MONTHS, YOU WORRIED THAT YOUR FOOD WOULD RUN OUT BEFORE YOU GOT MONEY TO BUY MORE.: NEVER TRUE

## 2024-05-24 SDOH — ECONOMIC STABILITY: INCOME INSECURITY: HOW HARD IS IT FOR YOU TO PAY FOR THE VERY BASICS LIKE FOOD, HOUSING, MEDICAL CARE, AND HEATING?: VERY HARD

## 2024-05-24 SDOH — SOCIAL STABILITY: SOCIAL INSECURITY: HAVE YOU HAD THOUGHTS OF HARMING ANYONE ELSE?: NO

## 2024-05-24 SDOH — ECONOMIC STABILITY: HOUSING INSECURITY: IN THE LAST 12 MONTHS, HOW MANY PLACES HAVE YOU LIVED?: 1

## 2024-05-24 SDOH — HEALTH STABILITY: PHYSICAL HEALTH: ON AVERAGE, HOW MANY MINUTES DO YOU ENGAGE IN EXERCISE AT THIS LEVEL?: 0 MIN

## 2024-05-24 SDOH — SOCIAL STABILITY: SOCIAL NETWORK: IN A TYPICAL WEEK, HOW MANY TIMES DO YOU TALK ON THE PHONE WITH FAMILY, FRIENDS, OR NEIGHBORS?: TWICE A WEEK

## 2024-05-24 SDOH — SOCIAL STABILITY: SOCIAL INSECURITY: DOES ANYONE TRY TO KEEP YOU FROM HAVING/CONTACTING OTHER FRIENDS OR DOING THINGS OUTSIDE YOUR HOME?: NO

## 2024-05-24 SDOH — SOCIAL STABILITY: SOCIAL INSECURITY: ARE YOU OR HAVE YOU BEEN THREATENED OR ABUSED PHYSICALLY, EMOTIONALLY, OR SEXUALLY BY ANYONE?: NO

## 2024-05-24 SDOH — SOCIAL STABILITY: SOCIAL INSECURITY: HAS ANYONE EVER THREATENED TO HURT YOUR FAMILY OR YOUR PETS?: NO

## 2024-05-24 ASSESSMENT — COGNITIVE AND FUNCTIONAL STATUS - GENERAL
MOBILITY SCORE: 21
DAILY ACTIVITIY SCORE: 18
HELP NEEDED FOR BATHING: A LITTLE
MOBILITY SCORE: 24
PATIENT BASELINE BEDBOUND: NO
EATING MEALS: A LITTLE
DAILY ACTIVITIY SCORE: 23
DRESSING REGULAR LOWER BODY CLOTHING: A LITTLE
STANDING UP FROM CHAIR USING ARMS: A LITTLE
CLIMB 3 TO 5 STEPS WITH RAILING: A LOT
DRESSING REGULAR UPPER BODY CLOTHING: A LITTLE
DAILY ACTIVITIY SCORE: 24
PERSONAL GROOMING: A LITTLE
WALKING IN HOSPITAL ROOM: A LITTLE
MOBILITY SCORE: 20
TOILETING: A LITTLE
CLIMB 3 TO 5 STEPS WITH RAILING: A LITTLE
HELP NEEDED FOR BATHING: A LITTLE
MOVING TO AND FROM BED TO CHAIR: A LITTLE
WALKING IN HOSPITAL ROOM: A LITTLE

## 2024-05-24 ASSESSMENT — ACTIVITIES OF DAILY LIVING (ADL)
PATIENT'S MEMORY ADEQUATE TO SAFELY COMPLETE DAILY ACTIVITIES?: YES
FEEDING YOURSELF: INDEPENDENT
GROOMING: INDEPENDENT
ADL_ASSISTANCE: INDEPENDENT
DRESSING YOURSELF: INDEPENDENT
ADL_ASSISTANCE: INDEPENDENT
TOILETING: INDEPENDENT
HEARING - LEFT EAR: FUNCTIONAL
BATHING_ASSISTANCE: NOT PERFORMED
HEARING - RIGHT EAR: FUNCTIONAL
BATHING: INDEPENDENT
WALKS IN HOME: INDEPENDENT
ADEQUATE_TO_COMPLETE_ADL: YES
JUDGMENT_ADEQUATE_SAFELY_COMPLETE_DAILY_ACTIVITIES: YES

## 2024-05-24 ASSESSMENT — PATIENT HEALTH QUESTIONNAIRE - PHQ9
SUM OF ALL RESPONSES TO PHQ9 QUESTIONS 1 & 2: 0
2. FEELING DOWN, DEPRESSED OR HOPELESS: NOT AT ALL
1. LITTLE INTEREST OR PLEASURE IN DOING THINGS: NOT AT ALL

## 2024-05-24 ASSESSMENT — PAIN SCALES - PAIN ASSESSMENT IN ADVANCED DEMENTIA (PAINAD)
CONSOLABILITY: NO NEED TO CONSOLE
FACIALEXPRESSION: SMILING OR INEXPRESSIVE
BODYLANGUAGE: RELAXED
TOTALSCORE: 0
CONSOLABILITY: NO NEED TO CONSOLE
BREATHING: NORMAL
FACIALEXPRESSION: SMILING OR INEXPRESSIVE
BREATHING: NORMAL
TOTALSCORE: 0
BODYLANGUAGE: RELAXED

## 2024-05-24 ASSESSMENT — COLUMBIA-SUICIDE SEVERITY RATING SCALE - C-SSRS
1. IN THE PAST MONTH, HAVE YOU WISHED YOU WERE DEAD OR WISHED YOU COULD GO TO SLEEP AND NOT WAKE UP?: NO
2. HAVE YOU ACTUALLY HAD ANY THOUGHTS OF KILLING YOURSELF?: NO
6. HAVE YOU EVER DONE ANYTHING, STARTED TO DO ANYTHING, OR PREPARED TO DO ANYTHING TO END YOUR LIFE?: NO

## 2024-05-24 ASSESSMENT — PAIN DESCRIPTION - LOCATION: LOCATION: GENERALIZED

## 2024-05-24 ASSESSMENT — LIFESTYLE VARIABLES
SKIP TO QUESTIONS 9-10: 1
HOW MANY STANDARD DRINKS CONTAINING ALCOHOL DO YOU HAVE ON A TYPICAL DAY: PATIENT DOES NOT DRINK
AUDIT-C TOTAL SCORE: 0
HOW OFTEN DO YOU HAVE A DRINK CONTAINING ALCOHOL: NEVER
AUDIT-C TOTAL SCORE: 0
SKIP TO QUESTIONS 9-10: 1
HOW OFTEN DO YOU HAVE 6 OR MORE DRINKS ON ONE OCCASION: NEVER
AUDIT-C TOTAL SCORE: 0
SKIP TO QUESTIONS 9-10: 1
AUDIT-C TOTAL SCORE: 0
SUBSTANCE_ABUSE_PAST_12_MONTHS: NO
PRESCIPTION_ABUSE_PAST_12_MONTHS: NO

## 2024-05-24 ASSESSMENT — PAIN - FUNCTIONAL ASSESSMENT
PAIN_FUNCTIONAL_ASSESSMENT: 0-10

## 2024-05-24 ASSESSMENT — PAIN SCALES - GENERAL
PAINLEVEL_OUTOF10: 0 - NO PAIN
PAINLEVEL_OUTOF10: 4
PAINLEVEL_OUTOF10: 0 - NO PAIN
PAINLEVEL_OUTOF10: 5 - MODERATE PAIN

## 2024-05-24 NOTE — PROGRESS NOTES
Pharmacy Medication History Review    Mercedes Castellanos is a 84 y.o. female admitted for Encephalopathy acute. Pharmacy reviewed the patient's [id number] medications and allergies for accuracy.    Medications ADDED:  none  Medications CHANGED:  none  Medications REMOVED:   Gabapentin 100mg - not taking     The list below reflects the updated PTA list. Comments regarding how patient may be taking medications differently can be found in the Admit Orders Activity  Prior to Admission Medications   Prescriptions Last Dose Informant   B complex-vitamin C-folic acid (Nephro-Radha) 0.8 mg tablet 2024 self   Sig: Take 1 tablet by mouth once daily.   apixaban (Eliquis) 5 mg tablet 2024 self   Sig: Take 1 tablet (5 mg) by mouth 2 times a day.   dilTIAZem (Cardizem) 120 mg immediate release tablet 2024 self   Sig: Take 1 tablet (120 mg) by mouth 2 times a day.   escitalopram (Lexapro) 10 mg tablet 2024 self   Sig: Take 1 tablet (10 mg) by mouth once daily.   fluticasone (Flonase) 50 mcg/actuation nasal spray More than a month self   Sig: Administer 1 spray into each nostril 2 times a day. Shake gently. Before first use, prime pump. After use, clean tip and replace cap.   magnesium oxide (Mag-Ox) 400 mg tablet 2024 self   Si tablet (400 mg) once daily.   metoprolol succinate XL (Toprol-XL) 25 mg 24 hr tablet 2024 self   Sig: TAKE 1 TABLET BY MOUTH ONCE DAILY   multivitamin tablet More than a month self   Sig: Take 1 tablet by mouth once daily.   omeprazole (PriLOSEC) 40 mg DR capsule 2024 self   Sig: Take 1 capsule (40 mg) by mouth once daily in the morning. Take before meals.   potassium chloride CR 10 mEq ER tablet 2024 self   Sig: TAKE 1 TABLET BY MOUTH ONCE DAILY WITH FOOD      Facility-Administered Medications: None        The list below reflects the updated allergy list. Please review each documented allergy for additional clarification and justification.  Allergies   Reviewed by Shanika Ellis on 5/24/2024        Severity Reactions Comments    Hydrocodone-acetaminophen Not Specified Unknown, Other     Nitrofurantoin Not Specified Other leg weakness            Pharmacy has been updated to [facility].    Sources used to complete the med history include dispense history, PTA medication list, patient interview. Patient is a poor historian.    Below are additional concerns with the patient's PTA list.  Another provider reported last doses. I was unable to confirm with patient. She suffered from extreme confusion from her UTI. She was unable to report to me if she had taken her medications yesterday. Patient reported probably the day prior.    Shanika Ellis  Please reach out via [employer] Secure Chat for questions

## 2024-05-24 NOTE — PROGRESS NOTES
05/24/24 1919   Discharge Planning   Patient expects to be discharged to: Home     Patient is independent.  Declining home going needs.

## 2024-05-24 NOTE — PROGRESS NOTES
05/24/24 1919   [facility] Disability Status   Are you deaf or do you have serious difficulty hearing? N   Are you blind or do you have serious difficulty seeing, even when wearing glasses? N   Because of a physical, mental, or emotional condition, do you have serious difficulty concentrating, remembering, or making decisions? (5 years old or older) N   Do you have serious difficulty walking or climbing stairs? N   Do you have serious difficulty dressing or bathing? N   Because of a physical, mental, or emotional condition, do you have serious difficulty doing errands alone such as visiting the doctor? N

## 2024-05-24 NOTE — PROGRESS NOTES
05/24/24 1915   Physical Activity   On average, how many days per week do you engage in moderate to strenuous exercise (like a brisk walk)? 0 days   On average, how many minutes do you engage in exercise at this level? 0 min   Financial Resource Strain   How hard is it for you to pay for the very basics like food, housing, medical care, and heating? Very Hard   Housing Stability   In the last 12 months, was there a time when you were not able to pay the mortgage or rent on time? N   In the last 12 months, was there a time when you did not have a steady place to sleep or slept in a shelter (including now)? Y   Food Insecurity   Within the past 12 months, you worried that your food would run out before you got the money to buy more. Never true   Within the past 12 months, the food you bought just didn't last and you didn't have money to get more. Never true   Social Connections   Are you , , , , never , or living with a partner?    Intimate Partner Violence   Within the last year, have you been afraid of your partner or ex-partner? No   Within the last year, have you been humiliated or emotionally abused in other ways by your partner or ex-partner? No   Within the last year, have you been kicked, hit, slapped, or otherwise physically hurt by your partner or ex-partner? No   Within the last year, have you been raped or forced to have any kind of sexual activity by your partner or ex-partner? No   Alcohol Use   Q1: How often do you have a drink containing alcohol? Never   Q2: How many drinks containing alcohol do you have on a typical day when you are drinking? None   Q3: How often do you have six or more drinks on one occasion? Never   Utilities   In the past 12 months has the electric, gas, oil, or water company threatened to shut off services in your home? No   Health Literacy   How often do you need to have someone help you when you read instructions, pamphlets, or  other written material from your doctor or pharmacy? Never     Met with patient at bedside.  An explanation of discharge planning was provided.  Patient resides alone in a ranch style condo.  Her daughter lives nearby and stops by frequently.  Patient is independent with all ADL's.  Patient does not require the use of any assistive devices .  Patient does not require oxygen or cpap.  Patient is not diabetic.  Pateint is able to cook, clean shop and drive.  Patient denies recent falls.  Patient does not smoke or drink alcohol.  Patient follows Dr. Rosenberg for cardiology needs.  PCP is Dr. Emmanuel.  Patient does not have a POA and declines paperwork.  Patient will discharge home with no needs when medically ready .

## 2024-05-24 NOTE — PROGRESS NOTES
Occupational Therapy    Evaluation    Patient Name: Mercedes Castellanos  MRN: [id number]  Today's Date: 5/24/2024  Time Calculation  Start Time: 0845  Stop Time: 0856  Time Calculation (min): 11 min        Assessment:  OT Assessment: 84 y.o. female presenting with Confusion and Back Pain from the Urgent Care Facility. CT abdomen 5/23Under distention of the urinary bladder limits evaluation however  correlate for cystitis suggested. No evidence of obstructive  nephropathy identified.      Diverticulosis, moderate hiatal hernia, pericardial effusion. Patient is positive for UTI. Patient is limited this date with pain, decreased strength, decreased endurance, decreased cognition for memory, will benefit from continued OT and low intensity rehab is recommended.  Prognosis: Good  Evaluation/Treatment Tolerance: Patient limited by fatigue, Patient limited by pain  Medical Staff Made Aware: Yes  End of Session Communication: Bedside nurse  End of Session Patient Position: Bed, 2 rail up, Alarm off, not on at start of session  OT Assessment Results: Decreased ADL status, Decreased upper extremity strength, Decreased safe judgment during ADL, Decreased cognition, Decreased endurance, Decreased functional mobility, Decreased IADLs  Prognosis: Good  Evaluation/Treatment Tolerance: Patient limited by fatigue, Patient limited by pain  Medical Staff Made Aware: Yes  Strengths: Support of Caregivers, Premorbid level of function  Barriers to Participation: Comorbidities  Plan:  Treatment Interventions: ADL retraining, Functional transfer training, UE strengthening/ROM, Endurance training, Cognitive reorientation, Patient/family training, Equipment evaluation/education  OT Frequency: 3 times per week  OT Discharge Recommendations: Low intensity level of continued care, 24 hr supervision due to cognition (per patient daughter is able to assist as well as S-I-L is retired and can assist.)  OT Recommended Transfer Status: Stand by  assist  OT - OK to Discharge: Yes  Treatment Interventions: ADL retraining, Functional transfer training, UE strengthening/ROM, Endurance training, Cognitive reorientation, Patient/family training, Equipment evaluation/education    Subjective   Current Problem:  1. Pyelonephritis          General:  General  Reason for Referral: Decreased ADL for acute encephalopathy.  Referred By: Terrance Medley MD  Past Medical History Relevant to Rehab: dementia, chronic back pain, HTN, IBS, UTI, unsteady gait  Family/Caregiver Present: No  Prior to Session Communication: Bedside nurse  Patient Position Received: Bed, 2 rail up, Alarm off, not on at start of session  Preferred Learning Style: verbal, visual  General Comment: cleared to see patient for OT patient agreable to therapy. Patient met in the ED this date.  Precautions:  Hearing/Visual Limitations: glasses for reading.  Medical Precautions: Fall precautions  Precautions Comment: Patient was instructed in using the call light to call nurse to use the restroom.  Vital Signs:  Heart Rate: 82  Pulse Ox: 94 %  BP: 135/85  Patient Position: Lying  Pain:  Pain Assessment  Pain Assessment: 0-10  Pain Score: 5 - Moderate pain  Pain Type: Chronic pain  Pain Location: Back  Pain Frequency: Intermittent (with movement)  Patient's Stated Pain Goal: No pain  Pain Interventions: Repositioned  Response to Interventions: Patient reports decreased pain with laying down.    Objective   Cognition:  Overall Cognitive Status: Impaired (memory)  Orientation Level: Disoriented to situation, Disoriented to time  Attention: Within Functional Limits  Memory: Exceptions to WFL  Long-Term Memory: Impaired  Short-Term Memory: Impaired  Problem Solving: Exceptions to WFL  Complex Functional Tasks: Impaired  Insight: Mild    Home Living:  Type of Home: House  Lives With: Alone  Home Adaptive Equipment: None  Home Layout: One level  Home Access: Stairs to enter without rails  Entrance Stairs-Rails:  None  Entrance Stairs-Number of Steps: 1  Bathroom Shower/Tub: Tub/shower unit  Bathroom Toilet: Standard  Bathroom Equipment: None  Home Living Comments: Patient reports living alone in a condo with her dog.  Prior Function:  Level of [place]: Independent with ADLs and functional transfers, Independent with homemaking with ambulation  Receives Help From: Family (daughter and S-I-L)  ADL Assistance: Independent  Homemaking Assistance: Independent  Ambulatory Assistance: Independent (without AD)  Vocational: Retired  Hand Dominance: Right  Prior Function Comments: Patient reports independent with all ADL /I ADL's. Patient reports driving, no falls reported. Patient reports managing her own medication and finances.  IADL History:  Homemaking Responsibilities: Yes  Meal Prep Responsibility: Primary  Laundry Responsibility: Primary  Cleaning Responsibility: Primary  Bill Paying/Finance Responsibility: Primary  Shopping Responsibility: Primary   Responsibility: No  Current License: Yes  Mode of Transportation: Car  ADL:  Eating Assistance: Independent  Eating Deficit: Setup  Grooming Assistance: Independent  Grooming Deficit: Setup  Bathing Assistance: Not performed  Bathing Deficit:  (in the ED)  UE Dressing Assistance: Independent  UE Dressing Deficit: Setup  LE Dressing Assistance: Stand by  LE Dressing Deficit: Don/doff R sock, Don/doff L sock  Toileting Assistance with Device: Not performed  Functional Assistance: Stand by  Activity Tolerance:  Endurance: Decreased tolerance for upright activites  Activity Tolerance Comments: Patient limted this date with back pain with standing and walking.  Bed Mobility/Transfers: Bed Mobility  Bed Mobility: Yes (Patient was able to get to the edge of the cart this date with Close Supervision and was able to return back on the cart with Close Supervision.)    Transfers  Transfer: Yes (Patient was Close Supervision this date to stand from the cart this date without  AD, no dizziness, no LOB.)    Functional Mobility:  Functional Mobility  Functional Mobility Performed: Yes (Patient was Close Supervision to walk around the bed in the room this date without AD, no dizziness, no LOB noted this date.)  Sitting Balance:  Static Sitting Balance  Static Sitting-Comment/Number of Minutes: Good, 5-6min  Dynamic Sitting Balance  Dynamic Sitting-Comments: Good, 5min  Standing Balance:  Static Standing Balance  Static Standing-Comment/Number of Minutes: Good, Close Supervison ,3-4min  Dynamic Standing Balance  Dynamic Standing-Comments: Good, Close Supervision 3-4min   Modalities:     Vision:Vision - Basic Assessment  Current Vision: Wears glasses only for reading  Sensation:  Sensation Comment: Patient denies any tingling/numbness in the hands.  Strength:  Strength Comments: 3+/5 overall BUE  Perception:     Coordination:  Movements are Fluid and Coordinated: Yes   Hand Function:  Gross Grasp: Functional  Coordination: Functional    Outcome Measures:[facility] Daily Activity  Putting on and taking off regular lower body clothing: A little  Bathing (including washing, rinsing, drying): A little  Putting on and taking off regular upper body clothing: A little  Toileting, which includes using toilet, bedpan or urinal: A little  Taking care of personal grooming such as brushing teeth: A little  Eating Meals: A little  Daily Activity - Total Score: 18        Education Documentation  No documentation found.  Education Comments  No comments found.        OP EDUCATION:       Goals:  Encounter Problems       Encounter Problems (Active)       OT Goals       Patient will be independent with all ADL of bathing, dressing, toileting with good safety and G balance.  (Progressing)       Start:  05/24/24    Expected End:  05/31/24            Patient will be able to complete all functional transfers/mobility independently using good safety with G balance.  (Progressing)       Start:  05/24/24    Expected End:   05/31/24            Patient will be able to tolerate 20 min of functional standing with G balance in prep for ADL/transfers. (Progressing)       Start:  05/24/24    Expected End:  05/31/24

## 2024-05-24 NOTE — PROGRESS NOTES
Mercedes Castellanos is a 84 y.o. female on day 0 of admission presenting with Encephalopathy acute.      Subjective     Described before.  Feels somewhat woozy when she is walking.  No new fevers or chills.  Worked physical therapy Occupational Therapy.         Objective     Last Recorded Vitals  BP (!) 152/91 (BP Location: Right arm, Patient Position: Lying)   Pulse 57   Temp 37.2 °C (99 °F) (Oral)   Resp 17   Wt 78.5 kg (173 lb 1 oz)   SpO2 98%   Intake/Output last 3 Shifts:    Intake/Output Summary (Last 24 hours) at 5/24/2024 1357  Last data filed at 5/24/2024 1210  Gross per 24 hour   Intake 675.83 ml   Output --   Net 675.83 ml       Admission Weight  Weight: 79.4 kg (175 lb) (05/23/24 1839)    Daily Weight  05/24/24 : 78.5 kg (173 lb 1 oz)    Image Results  ECG 12 lead  Atrial fibrillation  Nonspecific T wave abnormality  Abnormal ECG  When compared with ECG of 19-OCT-2017 19:48,  Atrial fibrillation has replaced Sinus rhythm  Vent. rate has increased BY  41 BPM  Nonspecific T wave abnormality, worse in Lateral leads      Physical Exam  Constitutional:       General: She is not in acute distress.     Appearance: She is not ill-appearing.   HENT:      Head: Normocephalic.      Nose: Nose normal.      Mouth/Throat:      Mouth: Mucous membranes are moist.   Eyes:      General: No scleral icterus.     Extraocular Movements: Extraocular movements intact.   Cardiovascular:      Rate and Rhythm: Normal rate and regular rhythm.      Heart sounds: No murmur heard.  Pulmonary:      Effort: Pulmonary effort is normal. No respiratory distress.      Breath sounds: Normal breath sounds. No wheezing or rhonchi.   Abdominal:      Palpations: Abdomen is soft.      Tenderness: There is no abdominal tenderness. There is no guarding.   Musculoskeletal:         General: No tenderness.   Skin:     Coloration: Skin is not jaundiced.      Findings: No erythema.   Neurological:      General: No focal deficit present.      Mental  Status: She is alert and oriented to person, place, and time.      Cranial Nerves: No cranial nerve deficit.      Comments: Patient has no evidence of current encephalopathy.   Psychiatric:         Mood and Affect: Mood normal.         Relevant Results               Assessment/Plan                  Principal Problem:    Encephalopathy acute  Active Problems:    Anxiety    GERD without esophagitis    Hyperlipidemia    UTI (urinary tract infection)    PAF (paroxysmal atrial fibrillation) (Multi)    Essential hypertension    Acute cystitis    Pyelonephritis    Metabolic encephalopathy  -My partner report that this had been present at admission, and the patient certainly is encephalopathic currently  -Suppose that there may have been some element of this secondary to UTI  -Monitor.    Urinary tract infection  -No evidence of overt sepsis  -Continue ceftriaxone.  Follow urine culture; most recent showed E. coli that was pansensitive.    Weakness  -Some ooziness that may be secondary to the UTI, will start normal saline running IV 50 mL/h.    Worked with physical therapy and Occupational Therapy and low intensity recommended.  Will see earlier tomorrow, anticipate discharge at that point.              Missael Alfonso, DO

## 2024-05-24 NOTE — PROGRESS NOTES
Physical Therapy    Physical Therapy Evaluation    Patient Name: Mercedes Castellanos  MRN: [id number]  Today's Date: 5/24/2024   Time Calculation  Start Time: 0745  Stop Time: 0800  Time Calculation (min): 15 min    Assessment/Plan   PT Assessment  Evaluation/Treatment Tolerance: Patient tolerated treatment well  Medical Staff Made Aware: Yes  Strengths: Ability to acquire knowledge, Attitude of self, Support of Caregivers, Premorbid level of function  Barriers to Participation: Comorbidities  End of Session Communication: Bedside nurse  Assessment Comment: pt demonstrated safe and mod independent functional mobility, amb without device; Pt is functioning at baseline, safe for d/c home when cleared by MD. No further skilled PT needs noted ; PT educated pt in amb in halls with close sueprvision of 1 staff for safety every 2 hrs. Will d/c from skilled PT services  End of Session Patient Position: On cart, Alarm off, not on at start of session (2 rails up; call button in reach)  IP OR SWING BED PT PLAN  Inpatient or Swing Bed: Inpatient  PT Plan  PT Plan: PT Eval only  PT Eval Only Reason: At baseline function  PT Discharge Recommendations: No further acute PT  PT Recommended Transfer Status: Stand by assist  PT - OK to Discharge: Yes      Subjective   General Visit Information:  General  Reason for Referral: imapired mobility; change in mental status  Referred By: Terrance Medley MD  Past Medical History Relevant to Rehab: dementia, chronic back pain, HTN, IBS, UTI, unsteady gait  Family/Caregiver Present: No  Prior to Session Communication: Bedside nurse  Patient Position Received: On cart, Alarm off, not on at start of session  Preferred Learning Style: verbal, visual  General Comment: 83 yo WF admitted to observation at [facility] via ED with c/o change of mental status and acute back pain---was sent from Urgent Care.  Home Living:  Home Living  Type of Home: Condo  Lives With: Alone  Home Adaptive Equipment: Cane  Home Layout:  "One level  Home Access: Stairs to enter without rails  Entrance Stairs-Rails: None  Entrance Stairs-Number of Steps: 1  Bathroom Shower/Tub: Tub/shower unit  Bathroom Toilet: Standard  Bathroom Equipment: None  Bathroom Accessibility: main floor  Prior Level of Function:  Prior Function Per Pt/Caregiver Report  Level of [place]: Independent with ADLs and functional transfers, Independent with homemaking with ambulation  Receives Help From: Family (daughter manages pt's meds)  ADL Assistance: Independent  Homemaking Assistance: Independent  Ambulatory Assistance: Independent  Vocational: Retired  Prior Function Comments: + drives  Precautions:  Precautions  Hearing/Visual Limitations: glasses;  Medical Precautions: Fall precautions  Vital Signs:  Vital Signs  Heart Rate: 78  Pulse Ox: 97 %  BP: 136/83  MAP (mmHg): 100  BP Location: Left arm  BP Method: Automatic  Patient Position: Lying    Objective   Pain:  Pain Assessment  Pain Assessment: 0-10  Pain Score: 0 - No pain  Cognition:  Cognition  Overall Cognitive Status: Within Functional Limits  Orientation Level: Other (Comment), Oriented X4 (pt required extram time to verbalize month and year)  Memory:  (h/o \" mild dementia\")    General Assessments:  General Observation  General Observation: pleasant, cooperative, NAD               Activity Tolerance  Endurance: Endurance does not limit participation in activity    Sensation  Light Touch: No apparent deficits    Strength  Strength Comments: melquiades hips 3+/5, knees 4/5, ankles 4-/5  Coordination  Heel to Shin: Intact    Postural Control  Posture Comment: obese with mild forward head    Static Sitting Balance  Static Sitting-Balance Support: Feet unsupported  Static Sitting-Level of Assistance: Modified independent  Static Sitting-Comment/Number of Minutes: 3-4 min with good balance  Dynamic Sitting Balance  Dynamic Sitting-Balance Support: Feet unsupported  Dynamic Sitting-Comments: distant sueprvision of 1 with " good balance    Static Standing Balance  Static Standing-Balance Support: No upper extremity supported  Static Standing-Level of Assistance: Close supervision  Static Standing-Comment/Number of Minutes: 3-4 min with good balance  Dynamic Standing Balance  Dynamic Standing-Balance Support: No upper extremity supported  Dynamic Standing-Comments: close supervision with good - to good balance  Functional Assessments:  Bed Mobility  Bed Mobility: Yes  Bed Mobility 1  Bed Mobility 1: Supine to sitting  Level of Assistance 1: Modified independent  Bed Mobility Comments 1: head of cart elevated slightly  Bed Mobility 2  Bed Mobility  2: Sitting to supine  Level of Assistance 2: Modified independent  Bed Mobility Comments 2: head of cart elevated slightly    Transfers  Transfer: Yes  Transfer 1  Transfer From 1: Sit to  Transfer to 1: Stand  Technique 1: Sit to stand  Transfer Level of Assistance 1: Close supervision  Trials/Comments 1: pt demonstrates good safety technique without difficulty  Transfers 2  Transfer From 2: Stand to  Transfer to 2: Sit  Technique 2: Stand to sit  Transfer Level of Assistance 2: Close supervision  Trials/Comments 2: pt demonstrates good safety technique without difficulty    Ambulation/Gait Training  Ambulation/Gait Training Performed: Yes  Ambulation/Gait Training 1  Surface 1: Level tile  Device 1: No device  Assistance 1: Close supervision  Comments/Distance (ft) 1: pt stood at bedside for 3-4 min then ambulated forward/retro, side stepping to left/right x 4-5 trials successively without difficulty/loss of balance ( unable to attempt extended distance amb due to lines/IV's hooked to cart. + 180 turns to left/right without loss of balance  Extremity/Trunk Assessments:  Cervical Spine   Cervical Spine:  (mild forward head)  RLE   RLE :  (see above strength comments)  LLE   LLE :  (see above strength comments)  Outcome Measures:  [facility] Basic Mobility  Turning from your back to your side while  in a flat bed without using bedrails: None  Moving from lying on your back to sitting on the side of a flat bed without using bedrails: None  Moving to and from bed to chair (including a wheelchair): A little  Standing up from a chair using your arms (e.g. wheelchair or bedside chair): A little  To walk in hospital room: A little  Climbing 3-5 steps with railing: A little  Basic Mobility - Total Score: 20        Education Documentation  Mobility Training, taught by Kat Thompson PT at 5/24/2024 10:14 AM.  Learner: Patient  Readiness: Acceptance  Method: Explanation  Response: Verbalizes Understanding

## 2024-05-24 NOTE — H&P
History Of Present Illness      Mercedes Castellanos is a 84 y.o. female presenting with Confusion and Back Pain from the Urgent Care Facility.    There was concern for a kidney infection at the urgent care facility.  Patient also did have associated dysuria and left flank pain.  Urgent care is concerned patient may have pyelonephritis or kidney stone.  She denied any fevers.  Patient was somewhat of a poor historian and history was limited as per mild dementia per ED notes.  Family admitted that the patient has been becoming more confused lately.  Has had some left flank pain and vomiting.    In the ED the patient's vital signs are noted for Tmax 36.4, pulse rate 80, respiratory rate 18, /92.  Patient saturate 97% on room air.      Patient's ED diagnostic workup noted for minimal leukocytosis of 11.6.  There was a minimal neutrophil predominance as well.  Patient's H&H was stable.  Her platelet count was stable.  Her blood chemistry was essentially unremarkable.  Elevation of glucose level 114.  Urinalysis was suspicious for infection with positive leuk esterase and pyorrhea.  There is some microhematuria as well.  Urine culture was ordered.  Chest x-ray for the patient was read as cardiomegaly with small hiatal hernia.  No other acute intrathoracic abnormality.  CT of the brain without contrast was negative for any acute intracranial hemorrhage or mass effect.  Nonspecific white matter changes and parenchymal loss.  CT abdomen pelvis without IV contrast was obtained.  This was noted for understanding of the urinary bladder limits evaluation however correlate for cystitis suggested.  No evidence of obstructive nephropathy.  Diverticulosis, moderate hiatal hernia, trace pericardial effusion and additional findings as detailed.  There was also mentioning of bibasilar interstitial opacities and hazy parenchymal opacities with scattered atelectasis which was not on the impression.         Past Medical  History      Past Medical History:   Diagnosis Date    Acute candidiasis of vulva and vagina 09/21/2018    Yeast vaginitis    Body mass index (BMI) 35.0-35.9, adult     BMI 35.0-35.9,adult    Body mass index (BMI) 36.0-36.9, adult     BMI 36.0-36.9,adult    Combined forms of age-related cataract, bilateral 06/09/2022    Combined form of age-related cataract, both eyes    Disorder of the skin and subcutaneous tissue, unspecified 01/12/2017    Skin lesion    Dorsalgia, unspecified 12/09/2013    Pain, upper back    Dyskinesia of esophagus 09/16/2019    Esophageal spasm    Elevated blood-pressure reading, without diagnosis of hypertension 04/08/2019    Elevated blood pressure reading    Encounter for screening for lipoid disorders 04/18/2019    Encounter for lipid screening for cardiovascular disease    Essential (primary) hypertension 09/10/2018    Benign essential hypertension    Irritable bowel syndrome with diarrhea 11/07/2019    Irritable bowel syndrome with diarrhea    Lumbago with sciatica, left side 04/24/2019    Chronic bilateral low back pain with bilateral sciatica    Mastodynia 09/26/2019    Breast tenderness in female    Menopausal and female climacteric states 08/19/2019    Female climacteric state    Other conditions influencing health status 08/30/2018    History of burning on urination    Other conditions influencing health status 09/09/2013    Foot pain, unspecified laterality    Other conditions influencing health status     Colonoscopy (Fiberoptic)    Other conditions influencing health status     Mammogram    Other dysphagia 11/07/2019    Esophageal dysphagia    Other dysphagia 12/10/2019    Esophageal dysphagia    Other specified soft tissue disorders 06/26/2019    Left leg swelling    Pain in right hip 11/13/2019    Right hip pain    Pain in right shoulder 06/26/2019    Chronic right shoulder pain    Pain in thoracic spine 08/26/2020    Chronic bilateral thoracic back pain    Personal history of  other diseases of the circulatory system     History of hypertension    Personal history of other diseases of the digestive system 10/03/2019    History of gastroesophageal reflux (GERD)    Personal history of other diseases of the musculoskeletal system and connective tissue 12/15/2020    History of low back pain    Personal history of other drug therapy 10/12/2016    History of influenza vaccination    Personal history of other drug therapy 02/09/2021    History of pneumococcal vaccination    Personal history of other endocrine, nutritional and metabolic disease 09/10/2018    History of vitamin D deficiency    Personal history of other medical treatment 07/20/2017    History of screening mammography    Personal history of other mental and behavioral disorders 08/19/2019    History of anxiety    Personal history of other specified conditions 09/10/2018    History of fatigue    Personal history of other specified conditions 05/01/2019    History of abnormal mammogram    Personal history of other specified conditions 10/19/2017    History of precordial chest pain    Personal history of other specified conditions 07/07/2020    History of dizziness    Personal history of other specified conditions 08/30/2018    History of painful urination    Personal history of other specified conditions 09/23/2018    History of diarrhea    Personal history of urinary (tract) infections 09/10/2018    History of urinary tract infection    Primary osteoarthritis, unspecified hand 07/07/2020    Hand arthritis    Sacrococcygeal disorders, not elsewhere classified 12/15/2020    Sacroiliac joint dysfunction of right side    Trigger finger, unspecified finger 10/12/2016    Trigger finger, acquired    Unspecified atrial fibrillation (Multi) 10/08/2018    New onset a-fib    Unsteadiness on feet 03/11/2020    Unsteadiness on feet    Vitreous degeneration, bilateral 08/26/2019    Degeneration of posterior vitreous body of both eyes          Surgical History        Past Surgical History:   Procedure Laterality Date    ESOPHAGOGASTRODUODENOSCOPY  03/11/2014    Diagnostic Esophagogastroduodenoscopy    GASTRIC FUNDOPLICATION  05/27/2014    Esophagogastric Fundoplasty Nissen Fundoplication    OTHER SURGICAL HISTORY  05/26/2022    Rotator cuff repair    OTHER SURGICAL HISTORY  05/26/2022    Appendectomy    OTHER SURGICAL HISTORY  05/26/2022    Partial hysterectomy          Social History    She reports that she has never smoked. She has never used smokeless tobacco. She reports that she does not currently use alcohol. She reports that she does not use drugs.      Family History      Family History   Problem Relation Name Age of Onset    Diabetes Father      Coronary artery disease Sister      Coronary artery disease Brother      Glaucoma Brother      Coronary artery disease Other Family Hx     Ovarian cancer Sibling            Allergies      Hydrocodone-acetaminophen and Nitrofurantoin      Review of Systems    14-point ROS otherwise negative, as per HPI/Interval History.    General: No change in weight. No weakenss. No Fevers/Chills/Night Sweats   Skin: No skin/hair/nail changes. No rashes or sores.  Head:  No trauma. No Headache/nasuea/vomitting.   Eyes: No visual changes. No tearing. No itching.   Ears: No hearing loss. No tinnitus. No vertigo. No discharge.  Nose, Sinuses: No rhinorrhea, No nasal congestion. No epistaxis.  Mouth, Throat, Neck: No bleeding gums, hoarseness, sore throat or swollen neck  Cardiac: No palpitations. No SUAREZ. No PND. No Orthopnea.   Respiratory: No Shortness of Breath. No wheezing. No cough. No hemoptysis.   GI: No nausea/vomiting. No indigestion. No diarrhea. No constipation.   Extremities: No numbness or tingling. No paresthesias.   Urinary: No change in urinary frequency. No change in hesitancy. No hematuria. No incontinence.  Dysuria noted         Physical Exam        Constitutional: Extremely pleasant  Eyes: PERRL,  "EOMI,   ENMT: mucous membranes moist  Head/Neck: Neck supple, No JVD,   Respiratory/Thorax: Patent airways, CTAB,   Cardiovascular: Regular, rate and rhythm, no murmurs  Gastrointestinal: Soft, non-distended, +BS.  Musculoskeletal: ROM intact, no joint swelling, normal strength.  Mild left CVA tenderness  Extremities: peripheral pulses intact; no edema  Neurological: Alert and Oriented x 3; no focal deficits; gross motor and sensation intact; CN II-XII intact. No asterixis.  Psychological: Appropriate mood and behavior  Skin: No lesions, No rashes.         Last Recorded Vitals  Blood pressure (!) 139/92, pulse 80, temperature 36.4 °C (97.5 °F), temperature source Tympanic, resp. rate 18, height 1.6 m (5' 3\"), weight 79.4 kg (175 lb), SpO2 97%.    Relevant Results    Lab Results   Component Value Date    WBC 11.6 (H) 05/23/2024    HGB 14.8 05/23/2024    HCT 44.5 05/23/2024    MCV 90 05/23/2024     05/23/2024       Lab Results   Component Value Date    GLUCOSE 114 (H) 05/23/2024    CALCIUM 9.6 05/23/2024     05/23/2024    K 4.0 05/23/2024    CO2 25 05/23/2024     05/23/2024    BUN 13 05/23/2024    CREATININE 0.90 05/23/2024       No results found for: \"HGBA1C\"      CT head wo IV contrast    Result Date: 5/23/2024  Interpreted By:  Nuha Pulido, STUDY: CT HEAD WO IV CONTRAST;  5/23/2024 8:33 pm   INDICATION: Signs/Symptoms:Confusion.   COMPARISON: 03/01/2023   ACCESSION NUMBER(S): [id number]   ORDERING CLINICIAN: ZEIO SOARES   TECHNIQUE: Axial noncontrast CT images of the head.   FINDINGS: BRAIN PARENCHYMA: Generalized parenchymal volume loss noted with concordant ventricular enlargement. Non-specific white matter changes noted, which may be related to small vessel disease.  No mass effect or midline shift. Atherosclerotic calcifications of the carotid siphons. Expansile partially empty sella turcica.   HEMORRHAGE: No acute intracranial hemorrhage. VENTRICLES and EXTRA-AXIAL SPACES: " Normal size. EXTRACRANIAL SOFT TISSUES: Within normal limits. PARANASAL SINUSES/MASTOIDS: Trace fluid in septations in the right sphenoid sinus which may be seen with acute sinusitis. Otherwise the the visualized paranasal sinuses and mastoid air cells are aerated. CALVARIUM: No depressed skull fracture. No destructive osseous lesion.   OTHER FINDINGS: Postsurgical changes of the lenses.       No acute intracranial hemorrhage or mass effect.   Nonspecific white matter changes and parenchymal loss.   MACRO: None.   Signed by: Nuha Pulido 5/23/2024 8:54 PM Dictation workstation:   [id number]       Scheduled medications  apixaban, 5 mg, oral, BID  [START ON 5/24/2024] B complex-vitamin C, 1 tablet, oral, Daily  cefTRIAXone, 1 g, intravenous, q24h  dilTIAZem, 120 mg, oral, q12h  [START ON 5/24/2024] escitalopram, 10 mg, oral, Daily  [START ON 5/24/2024] magnesium oxide, 400 mg, oral, Daily  [START ON 5/24/2024] metoprolol succinate XL, 25 mg, oral, Daily  [START ON 5/24/2024] pantoprazole, 40 mg, oral, Daily before breakfast  [START ON 5/24/2024] potassium chloride CR, 10 mEq, oral, Daily      Continuous medications  sodium chloride 0.9%, 50 mL/hr      PRN medications  PRN medications: benzocaine-menthol, dextromethorphan-guaifenesin, guaiFENesin, ondansetron **OR** ondansetron, polyethylene glycol        Assessment/Plan   Principal Problem:    Encephalopathy acute  Active Problems:    Anxiety    GERD without esophagitis    Hyperlipidemia    UTI (urinary tract infection)    PAF (paroxysmal atrial fibrillation) (Multi)    Essential hypertension    Acute cystitis        Mercedes Castellanos is a 84 y.o. Female presenting with Confusion and Back Pain from the Urgent Care Facility.  Patient admitted for further evaluation and management.          Acute Encephalopathy    Admit for stabilization and safety measures as ED team mentions patient lives alone  CT of the brain without contrast appreciated  Nonspecific white matter  changes and parenchymal loss  Confusional state most likely related to progressive degenerative changes age-related and presence of a urinary tract infection  Will obtain a.m. TSH level  Fall precautions  Aspiration precautions  PT/OT  She has significantly improved during my encounter.  She even tells me that she was still driving a few days ago independently.  Her daughter lives close by and so does her son-in-law        Left Flank Pain/Acute Cystitis/UTI    Patient started empiric IV antibiotics  Follow-up urine culture  Possible passed nephrolithiasis      Moderate Hiatal Hernia    Incidental finding  Continue with semirecumbent positioning      Trace Pericardial Effusion    Patient to follow-up with PCP to repeat 2D echocardiogram in 3 to 6 months      Incidental finding Bibasilar Opacities    Will start the patient on empiric IV antibiotics  Possibly related to atelectasis can discontinue doxycycline if suspicion for pneumonia remains low      Scattered Atelectasis    Incentive spirometer      Paroxysmal atrial fibrillation    Continue DOAC for stroke prophylaxis  Continue rate control medications      Anxiety    Continue home SSRI dose      GERD      Continue home antacid        Patient will benefit from PT/OT evaluation.  Anticipate home with no needs versus home with home care once medically stable for discharge            This Dictation was Transcribed using a Nuance Dragon Voice Recognition System Device (with Compatible Computer + Software) and as such may contain Grammatical Errors and Unintentional Typing Misprints.      I spent 32 minutes in the professional and overall care of this patient.      Terrance Medley MD

## 2024-05-25 ENCOUNTER — PHARMACY VISIT (OUTPATIENT)
Dept: PHARMACY | Facility: CLINIC | Age: 84
End: 2024-05-25
Payer: COMMERCIAL

## 2024-05-25 PROBLEM — N12 PYELONEPHRITIS: Status: RESOLVED | Noted: 2024-05-23 | Resolved: 2024-05-25

## 2024-05-25 PROBLEM — G93.40 ENCEPHALOPATHY ACUTE: Status: RESOLVED | Noted: 2024-05-23 | Resolved: 2024-05-25

## 2024-05-25 LAB
GLUCOSE BLD MANUAL STRIP-MCNC: 116 MG/DL (ref 74–99)
GLUCOSE BLD MANUAL STRIP-MCNC: 125 MG/DL (ref 74–99)
GLUCOSE BLD MANUAL STRIP-MCNC: 160 MG/DL (ref 74–99)

## 2024-05-25 PROCEDURE — 82947 ASSAY GLUCOSE BLOOD QUANT: CPT

## 2024-05-25 PROCEDURE — 2500000001 HC RX 250 WO HCPCS SELF ADMINISTERED DRUGS (ALT 637 FOR MEDICARE OP): Performed by: INTERNAL MEDICINE

## 2024-05-25 PROCEDURE — 2500000002 HC RX 250 W HCPCS SELF ADMINISTERED DRUGS (ALT 637 FOR MEDICARE OP, ALT 636 FOR OP/ED): Performed by: INTERNAL MEDICINE

## 2024-05-25 PROCEDURE — 2500000004 HC RX 250 GENERAL PHARMACY W/ HCPCS (ALT 636 FOR OP/ED): Performed by: INTERNAL MEDICINE

## 2024-05-25 PROCEDURE — 2500000005 HC RX 250 GENERAL PHARMACY W/O HCPCS: Performed by: INTERNAL MEDICINE

## 2024-05-25 PROCEDURE — 2500000001 HC RX 250 WO HCPCS SELF ADMINISTERED DRUGS (ALT 637 FOR MEDICARE OP): Performed by: NURSE PRACTITIONER

## 2024-05-25 PROCEDURE — 2500000004 HC RX 250 GENERAL PHARMACY W/ HCPCS (ALT 636 FOR OP/ED): Performed by: NURSE PRACTITIONER

## 2024-05-25 PROCEDURE — G0378 HOSPITAL OBSERVATION PER HR: HCPCS

## 2024-05-25 PROCEDURE — RXMED WILLOW AMBULATORY MEDICATION CHARGE

## 2024-05-25 RX ORDER — SODIUM CHLORIDE 9 MG/ML
75 INJECTION, SOLUTION INTRAVENOUS CONTINUOUS
Status: DISCONTINUED | OUTPATIENT
Start: 2024-05-25 | End: 2024-05-26

## 2024-05-25 RX ORDER — CEFDINIR 300 MG/1
300 CAPSULE ORAL 2 TIMES DAILY
Qty: 10 CAPSULE | Refills: 0 | Status: SHIPPED | OUTPATIENT
Start: 2024-05-25 | End: 2024-05-30

## 2024-05-25 RX ORDER — CEFDINIR 300 MG/1
300 CAPSULE ORAL 2 TIMES DAILY
Status: DISCONTINUED | OUTPATIENT
Start: 2024-05-25 | End: 2024-05-30 | Stop reason: HOSPADM

## 2024-05-25 RX ORDER — DOXYCYCLINE 100 MG/1
100 CAPSULE ORAL 2 TIMES DAILY
Qty: 10 CAPSULE | Refills: 0 | Status: SHIPPED | OUTPATIENT
Start: 2024-05-25 | End: 2024-05-30 | Stop reason: HOSPADM

## 2024-05-25 RX ADMIN — APIXABAN 5 MG: 5 TABLET, FILM COATED ORAL at 10:47

## 2024-05-25 RX ADMIN — METOPROLOL SUCCINATE 25 MG: 25 TABLET, EXTENDED RELEASE ORAL at 10:47

## 2024-05-25 RX ADMIN — POTASSIUM CHLORIDE 10 MEQ: 750 TABLET, EXTENDED RELEASE ORAL at 10:47

## 2024-05-25 RX ADMIN — SODIUM CHLORIDE 75 ML/HR: 900 INJECTION, SOLUTION INTRAVENOUS at 05:23

## 2024-05-25 RX ADMIN — DOXYCYCLINE 100 MG: 100 INJECTION, POWDER, LYOPHILIZED, FOR SOLUTION INTRAVENOUS at 22:59

## 2024-05-25 RX ADMIN — CEFDINIR 300 MG: 300 CAPSULE ORAL at 20:43

## 2024-05-25 RX ADMIN — DOXYCYCLINE 100 MG: 100 INJECTION, POWDER, LYOPHILIZED, FOR SOLUTION INTRAVENOUS at 12:37

## 2024-05-25 RX ADMIN — SODIUM CHLORIDE 75 ML/HR: 900 INJECTION, SOLUTION INTRAVENOUS at 20:44

## 2024-05-25 RX ADMIN — DILTIAZEM HYDROCHLORIDE 120 MG: 60 TABLET ORAL at 10:47

## 2024-05-25 RX ADMIN — ESCITALOPRAM OXALATE 10 MG: 10 TABLET ORAL at 10:47

## 2024-05-25 RX ADMIN — PANTOPRAZOLE SODIUM 40 MG: 40 TABLET, DELAYED RELEASE ORAL at 10:47

## 2024-05-25 RX ADMIN — ASCORBIC ACID, THIAMINE MONONITRATE,RIBOFLAVIN, NIACINAMIDE, PYRIDOXINE HYDROCHLORIDE, FOLIC ACID, CYANOCOBALAMIN, BIOTIN, CALCIUM PANTOTHENATE, 1 CAPSULE: 100; 1.5; 1.7; 20; 10; 1; 6000; 150000; 5 CAPSULE, LIQUID FILLED ORAL at 10:47

## 2024-05-25 RX ADMIN — APIXABAN 5 MG: 5 TABLET, FILM COATED ORAL at 20:43

## 2024-05-25 RX ADMIN — SODIUM CHLORIDE 75 ML/HR: 900 INJECTION, SOLUTION INTRAVENOUS at 14:48

## 2024-05-25 RX ADMIN — Medication 400 MG: at 10:47

## 2024-05-25 ASSESSMENT — PAIN SCALES - GENERAL
PAINLEVEL_OUTOF10: 0 - NO PAIN
PAINLEVEL_OUTOF10: 0 - NO PAIN

## 2024-05-25 ASSESSMENT — PAIN SCALES - WONG BAKER: WONGBAKER_NUMERICALRESPONSE: NO HURT

## 2024-05-25 NOTE — DISCHARGE SUMMARY
Discharge Diagnosis  Encephalopathy acute    Issues Requiring Follow-Up      Discharge Meds     Your medication list        START taking these medications        Instructions Last Dose Given Next Dose Due   cefdinir 300 mg capsule  Commonly known as: Omnicef      Take 1 capsule (300 mg) by mouth 2 times a day for 5 days.       doxycycline 100 mg capsule  Commonly known as: Vibramycin      Take 1 capsule (100 mg) by mouth 2 times a day for 5 days. Take with at least 8 ounces (large glass) of water, do not lie down for 30 minutes after              CONTINUE taking these medications        Instructions Last Dose Given Next Dose Due   apixaban 5 mg tablet  Commonly known as: Eliquis      Take 1 tablet (5 mg) by mouth 2 times a day.       B complex-vitamin C-folic acid 0.8 mg tablet  Commonly known as: Nephro-Radha           dilTIAZem 120 mg immediate release tablet  Commonly known as: Cardizem      Take 1 tablet (120 mg) by mouth 2 times a day.       escitalopram 10 mg tablet  Commonly known as: Lexapro      Take 1 tablet (10 mg) by mouth once daily.       fluticasone 50 mcg/actuation nasal spray  Commonly known as: Flonase           magnesium oxide 400 mg tablet  Commonly known as: Mag-Ox           metoprolol succinate XL 25 mg 24 hr tablet  Commonly known as: Toprol-XL      TAKE 1 TABLET BY MOUTH ONCE DAILY       multivitamin tablet           omeprazole 40 mg DR capsule  Commonly known as: PriLOSEC      Take 1 capsule (40 mg) by mouth once daily in the morning. Take before meals.       potassium chloride CR 10 mEq ER tablet  Commonly known as: Klor-Con      TAKE 1 TABLET BY MOUTH ONCE DAILY WITH FOOD                 Where to Get Your Medications        These medications were sent to [facility]  [id number] [address] [facility] [address]      Hours: 9 AM to 6 PM Mon-Fri, 9 AM to 1 PM Sat Phone: [phone]   cefdinir 300 mg capsule  doxycycline 100 mg capsule         Test Results Pending At  Discharge  Pending Labs       Order Current Status    Urine Culture Preliminary result            Hospital Course   Presented to ER 5/23 with confusion and back pain from urgent care facility. Presented to Urgent care with concern for UTI and kidney stone. Evaluation revealed UTI and bilateral lung consolidation likely atelectasis and mild confusion presumed related to encephalopathy from infection. Patient's confusion rapidly improved with hydration and antibiotics. Culture with enteric bacilli. Continue oral antibiotics for additional 5 days. PT/OT consulted and recommending low-intensity needs at discharge.     Pertinent Physical Exam At Time of Discharge  Physical Exam  Constitutional:       Appearance: Normal appearance.   HENT:      Head: Normocephalic and atraumatic.      Mouth/Throat:      Mouth: Mucous membranes are moist.      Pharynx: Oropharynx is clear.   Eyes:      Extraocular Movements: Extraocular movements intact.      Conjunctiva/sclera: Conjunctivae normal.      Pupils: Pupils are equal, round, and reactive to light.   Cardiovascular:      Rate and Rhythm: Normal rate and regular rhythm.      Pulses: Normal pulses.      Heart sounds: Normal heart sounds.   Pulmonary:      Effort: Pulmonary effort is normal.      Breath sounds: Normal breath sounds.   Abdominal:      General: Bowel sounds are normal.      Palpations: Abdomen is soft.      Tenderness: There is no abdominal tenderness.   Musculoskeletal:      Cervical back: Normal range of motion and neck supple.   Skin:     General: Skin is warm and dry.      Capillary Refill: Capillary refill takes less than 2 seconds.   Neurological:      General: No focal deficit present.      Mental Status: She is alert and oriented to person, place, and time.   Psychiatric:         Mood and Affect: Mood normal.         Behavior: Behavior normal.         Outpatient Follow-Up  Future Appointments   Date Time Provider Department Center   9/3/2024  1:45 PM Tiana BOBBY  MD [id number]         Rosalind Tellez, APRN-CNP

## 2024-05-25 NOTE — PROGRESS NOTES
"Mercedes Castellanos is a 84 y.o. female on day 0 of admission presenting with Encephalopathy acute.    Subjective   Patient resting in bed. Denies chest pain, shortness of breath, abdominal pain, fevers, chills. Planned to discharge today, but patient became lightheaded and diaphoretic when standing.        Objective     Physical Exam  Constitutional:       Appearance: Normal appearance.   HENT:      Head: Normocephalic and atraumatic.      Mouth/Throat:      Mouth: Mucous membranes are moist.      Pharynx: Oropharynx is clear.   Eyes:      Extraocular Movements: Extraocular movements intact.      Conjunctiva/sclera: Conjunctivae normal.      Pupils: Pupils are equal, round, and reactive to light.   Cardiovascular:      Rate and Rhythm: Normal rate and regular rhythm.      Pulses: Normal pulses.      Heart sounds: Normal heart sounds.   Pulmonary:      Effort: Pulmonary effort is normal.      Breath sounds: Normal breath sounds.   Abdominal:      General: Bowel sounds are normal.      Palpations: Abdomen is soft.      Tenderness: There is no abdominal tenderness.   Musculoskeletal:         General: Normal range of motion.      Cervical back: Normal range of motion and neck supple.   Skin:     General: Skin is warm and dry.      Capillary Refill: Capillary refill takes less than 2 seconds.   Neurological:      General: No focal deficit present.      Mental Status: She is alert and oriented to person, place, and time.   Psychiatric:         Mood and Affect: Mood normal.         Behavior: Behavior normal.         Last Recorded Vitals  Blood pressure 123/71, pulse 109, temperature 36.6 °C (97.9 °F), temperature source Oral, resp. rate 18, height 1.626 m (5' 4\"), weight 78.5 kg (173 lb 1 oz), SpO2 97%.  Intake/Output last 3 Shifts:  I/O last 3 completed shifts:  In: 2114.6 (26.9 mL/kg) [P.O.:240; I.V.:1674.6 (21.3 mL/kg); IV Piggyback:200]  Out: - (0 mL/kg)   Weight: 78.5 kg     Relevant Results  Results for orders placed " or performed during the hospital encounter of 05/23/24 (from the past 24 hour(s))   POCT GLUCOSE   Result Value Ref Range    POCT Glucose 116 (H) 74 - 99 mg/dL   POCT GLUCOSE   Result Value Ref Range    POCT Glucose 160 (H) 74 - 99 mg/dL     ECG 12 lead    Result Date: 5/24/2024  Atrial fibrillation Nonspecific T wave abnormality Abnormal ECG When compared with ECG of 19-OCT-2017 19:48, Atrial fibrillation has replaced Sinus rhythm Vent. rate has increased BY  41 BPM Nonspecific T wave abnormality, worse in Lateral leads    CT abdomen pelvis wo IV contrast    Result Date: 5/23/2024  Interpreted By:  Nuha Pulido, STUDY: CT abdomen pelvis without contrast.   INDICATION: Signs/Symptoms:Left flank pain and dysuria.  Consider kidney stone or pyelonephritis.   COMPARISON: None.   ACCESSION NUMBER(S): [id number]   ORDERING CLINICIAN: EZIO SOARES   TECHNIQUE: Axial noncontrast CT images of the abdomen and pelvis with coronal and sagittal reconstructed images.   FINDINGS: LOWER CHEST: Bibasilar interstitial opacities and hazy parenchymal opacities with scattered atelectasis and moderate hiatal hernia. Trace pericardial effusion. Coronary artery calcifications noted however exam is not optimized for evaluation. Trace calcifications of the aortic valve. BONES: No acute osseous abnormality. Multilevel degenerative changes, most pronounced in the visualized thoracic spine. ABDOMINAL WALL: Within normal limits.   ABDOMEN: Lack of intravenous contrast limits evaluation of vessels and solid organs. LIVER: Within normal limits. BILE DUCTS: Normal caliber. GALLBLADDER: No calcified gallstones. No wall thickening. PANCREAS: No peripancreatic inflammatory stranding or duct dilatation. SPLEEN: Within normal limits. ADRENALS: Bilateral low-attenuation adrenal nodules measuring up to 1.9 cm on the left, suggestive of adenomas.   KIDNEYS, URETERS, URINARY BLADDER: No hydronephrosis or urinary tract calculus.  The urinary  bladder is decompressed, limiting evaluation.   VESSELS: Atherosclerotic calcifications without aneurysmal dilatation seen. RETROPERITONEUM: No pathologically enlarged lymph nodes.   PELVIS:   REPRODUCTIVE ORGANS: No abnormality, given limitations of the noncontrast CT.  No significant free pelvic fluid.   BOWEL: 3.8 cm duodenal diverticulum. No dilated bowel. Diverticulosis without CT evidence of acute diverticulitis. Normal appendix. PERITONEUM: No ascites or free air, no fluid collection.       Under distention of the urinary bladder limits evaluation however correlate for cystitis suggested. No evidence of obstructive nephropathy identified.   Diverticulosis, moderate hiatal hernia, trees pericardial effusion and additional findings as detailed.   MACRO: None   Signed by: Nuha Pulido 5/23/2024 9:02 PM Dictation workstation:   [id number]    CT head wo IV contrast    Result Date: 5/23/2024  Interpreted By:  Nuha Pulido, STUDY: CT HEAD WO IV CONTRAST;  5/23/2024 8:33 pm   INDICATION: Signs/Symptoms:Confusion.   COMPARISON: 03/01/2023   ACCESSION NUMBER(S): [id number]   ORDERING CLINICIAN: EZIO SOARES   TECHNIQUE: Axial noncontrast CT images of the head.   FINDINGS: BRAIN PARENCHYMA: Generalized parenchymal volume loss noted with concordant ventricular enlargement. Non-specific white matter changes noted, which may be related to small vessel disease.  No mass effect or midline shift. Atherosclerotic calcifications of the carotid siphons. Expansile partially empty sella turcica.   HEMORRHAGE: No acute intracranial hemorrhage. VENTRICLES and EXTRA-AXIAL SPACES: Normal size. EXTRACRANIAL SOFT TISSUES: Within normal limits. PARANASAL SINUSES/MASTOIDS: Trace fluid in septations in the right sphenoid sinus which may be seen with acute sinusitis. Otherwise the the visualized paranasal sinuses and mastoid air cells are aerated. CALVARIUM: No depressed skull fracture. No destructive osseous lesion.    OTHER FINDINGS: Postsurgical changes of the lenses.       No acute intracranial hemorrhage or mass effect.   Nonspecific white matter changes and parenchymal loss.   MACRO: None.   Signed by: Nuha Pulido 5/23/2024 8:54 PM Dictation workstation:   [id number]    XR chest 1 view    Result Date: 5/23/2024  Interpreted By:  Emiliano Bonilla, STUDY: XR CHEST 1 VIEW   INDICATION: Signs/Symptoms:altered.   COMPARISON: February 13, 2023   ACCESSION NUMBER(S): [id number]   ORDERING CLINICIAN: EZIO SOARES   FINDINGS: Cardiomegaly unchanged.   Small hiatal hernia unchanged.   No consolidation, effusion, edema, or pneumothorax.       Cardiomegaly with small hiatal hernia. No other acute intrathoracic abnormality.   Signed by: Emiliano Bonilla 5/23/2024 8:02 PM Dictation workstation:   [id number]       Assessment/Plan   Active Problems:    Anxiety    GERD without esophagitis    Hyperlipidemia    UTI (urinary tract infection)    PAF (paroxysmal atrial fibrillation) (Multi)    Essential hypertension    Acute cystitis    Metabolic encephalopathy  - Present on admission   - Resolved   - Suppose that there may have been some element of this secondary to UTI  - Monitor.     Urinary tract infection  - No evidence of overt sepsis  - Change to oral Cefdinir in anticipation of going home    - Follow urine culture; most recent showed E. coli that was pansensitive.     Weakness  - Orthostatic hypotension today.   -  down to 120 with standing. Hold tomorrow's diltiazem   - Resume IV fluids   - Encourage oral fluid intake   - Check AM labs   - Continue doxycycline for abnormal CT scan     DVT prophylaxis   - Eliquis      Worked with physical therapy and Occupational Therapy and low intensity recommended.  Will see earlier tomorrow, anticipate discharge at that point.     Rosalind Tellez, APRN-CNP

## 2024-05-25 NOTE — CARE PLAN
The patient's goals for the shift include Feel better    The clinical goals for the shift include Safety measures

## 2024-05-25 NOTE — CARE PLAN
The patient's goals for the shift include Feel better    The clinical goals for the shift include Evaluate her memory issues and her staying home alone      Problem: Pain - Adult  Goal: Verbalizes/displays adequate comfort level or baseline comfort level  Outcome: Progressing     Problem: Safety - Adult  Goal: Free from fall injury  Outcome: Progressing     Problem: Discharge Planning  Goal: Discharge to home or other facility with appropriate resources  Outcome: Progressing     Problem: Chronic Conditions and Co-morbidities  Goal: Patient's chronic conditions and co-morbidity symptoms are monitored and maintained or improved  Outcome: Progressing     Problem: Fall/Injury  Goal: Not fall by end of shift  Outcome: Progressing  Goal: Be free from injury by end of the shift  Outcome: Progressing  Goal: Verbalize understanding of personal risk factors for fall in the hospital  Outcome: Progressing  Goal: Verbalize understanding of risk factor reduction measures to prevent injury from fall in the home  Outcome: Progressing  Goal: Use assistive devices by end of the shift  Outcome: Progressing  Goal: Pace activities to prevent fatigue by end of the shift  Outcome: Progressing     Problem: Pain  Goal: Takes deep breaths with improved pain control throughout the shift  Outcome: Progressing  Goal: Turns in bed with improved pain control throughout the shift  Outcome: Progressing  Goal: Walks with improved pain control throughout the shift  Outcome: Progressing  Goal: Performs ADL's with improved pain control throughout shift  Outcome: Progressing  Goal: Participates in PT with improved pain control throughout the shift  Outcome: Progressing  Goal: Free from opioid side effects throughout the shift  Outcome: Progressing  Goal: Free from acute confusion related to pain meds throughout the shift  Outcome: Progressing

## 2024-05-25 NOTE — PROGRESS NOTES
Spiritual Care Visit    Clinical Encounter Type  Visited With: Patient  Routine Visit: Introduction  Continue Visiting: Yes         Values/Beliefs  Spiritual Requests During Hospitalization: Anoininting & Communon today    Sacramental Encounters  Communion: Patient wants communion  Communion Given Indicator: Yes  Sacrament of Sick-Anointing: Anointed     Thomas Puentes

## 2024-05-26 LAB
ANION GAP SERPL CALC-SCNC: 10 MMOL/L
BACTERIA UR CULT: ABNORMAL
BUN SERPL-MCNC: 17 MG/DL (ref 8–25)
CALCIUM SERPL-MCNC: 9 MG/DL (ref 8.5–10.4)
CHLORIDE SERPL-SCNC: 108 MMOL/L (ref 97–107)
CO2 SERPL-SCNC: 24 MMOL/L (ref 24–31)
CREAT SERPL-MCNC: 0.8 MG/DL (ref 0.4–1.6)
EGFRCR SERPLBLD CKD-EPI 2021: 73 ML/MIN/1.73M*2
ERYTHROCYTE [DISTWIDTH] IN BLOOD BY AUTOMATED COUNT: 14.6 % (ref 11.5–14.5)
GLUCOSE BLD MANUAL STRIP-MCNC: 114 MG/DL (ref 74–99)
GLUCOSE BLD MANUAL STRIP-MCNC: 123 MG/DL (ref 74–99)
GLUCOSE SERPL-MCNC: 117 MG/DL (ref 65–99)
HCT VFR BLD AUTO: 40.7 % (ref 36–46)
HGB BLD-MCNC: 13 G/DL (ref 12–16)
MCH RBC QN AUTO: 29.2 PG (ref 26–34)
MCHC RBC AUTO-ENTMCNC: 31.9 G/DL (ref 32–36)
MCV RBC AUTO: 92 FL (ref 80–100)
NRBC BLD-RTO: 0 /100 WBCS (ref 0–0)
PLATELET # BLD AUTO: 224 X10*3/UL (ref 150–450)
POTASSIUM SERPL-SCNC: 4.2 MMOL/L (ref 3.4–5.1)
RBC # BLD AUTO: 4.45 X10*6/UL (ref 4–5.2)
SODIUM SERPL-SCNC: 142 MMOL/L (ref 133–145)
WBC # BLD AUTO: 7.6 X10*3/UL (ref 4.4–11.3)

## 2024-05-26 PROCEDURE — 80048 BASIC METABOLIC PNL TOTAL CA: CPT | Performed by: NURSE PRACTITIONER

## 2024-05-26 PROCEDURE — 36415 COLL VENOUS BLD VENIPUNCTURE: CPT | Performed by: NURSE PRACTITIONER

## 2024-05-26 PROCEDURE — 2500000005 HC RX 250 GENERAL PHARMACY W/O HCPCS: Performed by: INTERNAL MEDICINE

## 2024-05-26 PROCEDURE — G0378 HOSPITAL OBSERVATION PER HR: HCPCS

## 2024-05-26 PROCEDURE — 2500000004 HC RX 250 GENERAL PHARMACY W/ HCPCS (ALT 636 FOR OP/ED): Performed by: INTERNAL MEDICINE

## 2024-05-26 PROCEDURE — 82947 ASSAY GLUCOSE BLOOD QUANT: CPT

## 2024-05-26 PROCEDURE — 2500000001 HC RX 250 WO HCPCS SELF ADMINISTERED DRUGS (ALT 637 FOR MEDICARE OP): Performed by: INTERNAL MEDICINE

## 2024-05-26 PROCEDURE — 2500000001 HC RX 250 WO HCPCS SELF ADMINISTERED DRUGS (ALT 637 FOR MEDICARE OP): Performed by: NURSE PRACTITIONER

## 2024-05-26 PROCEDURE — 85027 COMPLETE CBC AUTOMATED: CPT | Performed by: NURSE PRACTITIONER

## 2024-05-26 PROCEDURE — 2500000002 HC RX 250 W HCPCS SELF ADMINISTERED DRUGS (ALT 637 FOR MEDICARE OP, ALT 636 FOR OP/ED): Performed by: INTERNAL MEDICINE

## 2024-05-26 PROCEDURE — 2500000004 HC RX 250 GENERAL PHARMACY W/ HCPCS (ALT 636 FOR OP/ED): Performed by: NURSE PRACTITIONER

## 2024-05-26 PROCEDURE — 99223 1ST HOSP IP/OBS HIGH 75: CPT | Performed by: INTERNAL MEDICINE

## 2024-05-26 RX ORDER — DOXYCYCLINE 100 MG/1
100 CAPSULE ORAL EVERY 12 HOURS SCHEDULED
Status: DISCONTINUED | OUTPATIENT
Start: 2024-05-26 | End: 2024-05-28

## 2024-05-26 RX ORDER — SODIUM CHLORIDE 9 MG/ML
100 INJECTION, SOLUTION INTRAVENOUS CONTINUOUS
Status: DISCONTINUED | OUTPATIENT
Start: 2024-05-26 | End: 2024-05-28

## 2024-05-26 RX ADMIN — APIXABAN 5 MG: 5 TABLET, FILM COATED ORAL at 21:07

## 2024-05-26 RX ADMIN — ASCORBIC ACID, THIAMINE MONONITRATE,RIBOFLAVIN, NIACINAMIDE, PYRIDOXINE HYDROCHLORIDE, FOLIC ACID, CYANOCOBALAMIN, BIOTIN, CALCIUM PANTOTHENATE, 1 CAPSULE: 100; 1.5; 1.7; 20; 10; 1; 6000; 150000; 5 CAPSULE, LIQUID FILLED ORAL at 10:07

## 2024-05-26 RX ADMIN — SODIUM CHLORIDE 100 ML/HR: 900 INJECTION, SOLUTION INTRAVENOUS at 15:28

## 2024-05-26 RX ADMIN — DOXYCYCLINE 100 MG: 100 INJECTION, POWDER, LYOPHILIZED, FOR SOLUTION INTRAVENOUS at 10:08

## 2024-05-26 RX ADMIN — Medication 400 MG: at 10:07

## 2024-05-26 RX ADMIN — PANTOPRAZOLE SODIUM 40 MG: 40 TABLET, DELAYED RELEASE ORAL at 10:07

## 2024-05-26 RX ADMIN — DOXYCYCLINE HYCLATE 100 MG: 100 CAPSULE ORAL at 21:07

## 2024-05-26 RX ADMIN — METOPROLOL SUCCINATE 25 MG: 25 TABLET, EXTENDED RELEASE ORAL at 10:07

## 2024-05-26 RX ADMIN — POTASSIUM CHLORIDE 10 MEQ: 750 TABLET, EXTENDED RELEASE ORAL at 10:09

## 2024-05-26 RX ADMIN — APIXABAN 5 MG: 5 TABLET, FILM COATED ORAL at 10:07

## 2024-05-26 RX ADMIN — CEFDINIR 300 MG: 300 CAPSULE ORAL at 10:07

## 2024-05-26 RX ADMIN — ESCITALOPRAM OXALATE 10 MG: 10 TABLET ORAL at 10:07

## 2024-05-26 RX ADMIN — LIDOCAINE 1 PATCH: 4 PATCH TOPICAL at 10:07

## 2024-05-26 RX ADMIN — CEFDINIR 300 MG: 300 CAPSULE ORAL at 21:07

## 2024-05-26 ASSESSMENT — PAIN SCALES - WONG BAKER: WONGBAKER_NUMERICALRESPONSE: NO HURT

## 2024-05-26 ASSESSMENT — COGNITIVE AND FUNCTIONAL STATUS - GENERAL
CLIMB 3 TO 5 STEPS WITH RAILING: A LOT
WALKING IN HOSPITAL ROOM: A LITTLE
DAILY ACTIVITIY SCORE: 23
MOVING TO AND FROM BED TO CHAIR: A LITTLE
MOBILITY SCORE: 19
HELP NEEDED FOR BATHING: A LITTLE
STANDING UP FROM CHAIR USING ARMS: A LITTLE

## 2024-05-26 ASSESSMENT — PAIN SCALES - PAIN ASSESSMENT IN ADVANCED DEMENTIA (PAINAD)
BODYLANGUAGE: RELAXED
FACIALEXPRESSION: SMILING OR INEXPRESSIVE
BREATHING: NORMAL
CONSOLABILITY: NO NEED TO CONSOLE
TOTALSCORE: 0

## 2024-05-26 ASSESSMENT — PAIN SCALES - GENERAL
PAINLEVEL_OUTOF10: 0 - NO PAIN
PAINLEVEL_OUTOF10: 0 - NO PAIN

## 2024-05-26 NOTE — CONSULTS
Inpatient consult to Cardiology  Consult performed by: Nirmal Alonso MD  Consult ordered by: MILAN Oliva-CNP  Reason for consult: Orthostatic hypotension, hyperlipidemia.        History Of Present Illness:    Mercedes Castellanos is a 84 y.o. female presenting with confusion undergoing workup currently for confusion.  I was asked to see her for orthostatic hypotension.  Patient was found to have UTI with sepsis.  I was asked to see her for this orthostatic hypotension.  Was having also some back pain.  Denies having chest pain palpitations.  Patient has been complaining of dizziness lately when she stands up and reports back pain but no radiation.  Of note she has history of atrial fibrillation on oral anticoagulation.    Review of systems.    10 point review of systems otherwise negative     Last Recorded Vitals:  Vitals:    05/26/24 0700 05/26/24 0940 05/26/24 0945 05/26/24 0946   BP: 149/88 (!) 140/97 (!) 154/92 114/58   BP Location: Left arm Left arm Left arm Left arm   Patient Position: Lying Lying Sitting Standing   Pulse: 60 106 54    Resp: 18 18 18 18   Temp: 36.6 °C (97.9 °F)      TempSrc: Oral      SpO2: 97% 97% 97% 98%   Weight:       Height:           Last Labs:  CBC - 5/26/2024:  6:20 AM  7.6 13.0 224    40.7      CMP - 5/26/2024:  6:20 AM  9.0 7.8 19 --- 0.5   _ 4.0 9 85      PTT - No results in last year.  _   _ _     BNP   Date/Time Value Ref Range Status   10/19/2017 11:51  (H) 0 - 99 pg/mL Final     Comment:     .  <100 pg/mL - Heart failure unlikely  100-299 pg/mL - Intermediate probability of acute heart  .               failure exacerbation. Correlate with clinical  .               context and patient history.    >=300 pg/mL - Heart Failure likely. Correlate with clinical  .               context and patient history.  BNP testing is performed using different testing   methodology at [facility] than at other   [facility] hospitals. Direct result comparisons should  "  only be made within the same method.       VLDL   Date/Time Value Ref Range Status   01/27/2023 11:16 AM 13 0 - 40 mg/dL Final   03/16/2021 10:05 AM 13 0 - 40 mg/dL Final   09/01/2020 10:36 AM 19 0 - 40 mg/dL Final      Last I/O:  I/O last 3 completed shifts:  In: 2775 (35.4 mL/kg) [P.O.:940; I.V.:1835 (23.4 mL/kg)]  Out: - (0 mL/kg)   Weight: 78.5 kg     Past Cardiology Tests (Last 3 Years):  EKG:  ECG 12 lead 05/23/2024 (Preliminary)    Echo:  No results found for this or any previous visit from the past 1095 days.    Ejection Fractions:  No results found for: \"EF\"  Cath:  No results found for this or any previous visit from the past 1095 days.    Stress Test:  No results found for this or any previous visit from the past 1095 days.    Cardiac Imaging:  No results found for this or any previous visit from the past 1095 days.      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), Disorder of the skin and subcutaneous tissue, unspecified (01/12/2017), Dorsalgia, unspecified (12/09/2013), Dyskinesia of esophagus (09/16/2019), Elevated blood-pressure reading, without diagnosis of hypertension (04/08/2019), Encounter for screening for lipoid disorders (04/18/2019), Essential (primary) hypertension (09/10/2018), Irritable bowel syndrome with diarrhea (11/07/2019), Lumbago with sciatica, left side (04/24/2019), Mastodynia (09/26/2019), Menopausal and female climacteric states (08/19/2019), Other conditions influencing health status (08/30/2018), Other conditions influencing health status (09/09/2013), Other conditions influencing health status, Other conditions influencing health status, Other dysphagia (11/07/2019), Other dysphagia (12/10/2019), Other specified soft tissue disorders (06/26/2019), Pain in right hip " (11/13/2019), Pain in right shoulder (06/26/2019), Pain in thoracic spine (08/26/2020), Personal history of other diseases of the circulatory system, Personal history of other diseases of the digestive system (10/03/2019), Personal history of other diseases of the musculoskeletal system and connective tissue (12/15/2020), Personal history of other drug therapy (10/12/2016), Personal history of other drug therapy (02/09/2021), Personal history of other endocrine, nutritional and metabolic disease (09/10/2018), Personal history of other medical treatment (07/20/2017), Personal history of other mental and behavioral disorders (08/19/2019), Personal history of other specified conditions (09/10/2018), Personal history of other specified conditions (05/01/2019), Personal history of other specified conditions (10/19/2017), Personal history of other specified conditions (07/07/2020), Personal history of other specified conditions (08/30/2018), Personal history of other specified conditions (09/23/2018), Personal history of urinary (tract) infections (09/10/2018), Primary osteoarthritis, unspecified hand (07/07/2020), Sacrococcygeal disorders, not elsewhere classified (12/15/2020), Trigger finger, unspecified finger (10/12/2016), Unspecified atrial fibrillation (Multi) (10/08/2018), Unsteadiness on feet (03/11/2020), and Vitreous degeneration, bilateral (08/26/2019).    Past Surgical History:  She has a past surgical history that includes Esophagogastroduodenoscopy (03/11/2014); Gastric fundoplication (05/27/2014); Other surgical history (05/26/2022); Other surgical history (05/26/2022); and Other surgical history (05/26/2022).      Social History:  She reports that she has never smoked. She has never used smokeless tobacco. She reports that she does not currently use alcohol. She reports that she does not use drugs.    Family History:  Family History   Problem Relation Name Age of Onset    Diabetes Father      Coronary  artery disease Sister      Coronary artery disease Brother      Glaucoma Brother      Coronary artery disease Other Family Hx     Ovarian cancer Sibling          Allergies:  Hydrocodone-acetaminophen and Nitrofurantoin    Inpatient Medications:  Scheduled medications   Medication Dose Route Frequency    apixaban  5 mg oral BID    B complex-vitamin C-folic acid  1 capsule oral Daily    cefdinir  300 mg oral BID    [Held by provider] dilTIAZem  120 mg oral q12h    doxycylcine  100 mg oral q12h FRANCESCA    escitalopram  10 mg oral Daily    lidocaine  1 patch transdermal Daily    magnesium oxide  400 mg oral Daily    metoprolol succinate XL  25 mg oral Daily    pantoprazole  40 mg oral Daily    potassium chloride CR  10 mEq oral Daily     PRN medications   Medication    acetaminophen    benzocaine-menthol    dextromethorphan-guaifenesin    guaiFENesin    ondansetron    Or    ondansetron    polyethylene glycol     Continuous Medications   Medication Dose Last Rate     Outpatient Medications:  Current Outpatient Medications   Medication Instructions    apixaban (ELIQUIS) 5 mg, oral, 2 times daily    B complex-vitamin C-folic acid (Nephro-Radha) 0.8 mg tablet 0.8 mg, oral, Daily    cefdinir (OMNICEF) 300 mg, oral, 2 times daily    dilTIAZem (CARDIZEM) 120 mg, oral, 2 times daily    doxycycline (VIBRAMYCIN) 100 mg, oral, 2 times daily, Take with at least 8 ounces (large glass) of water, do not lie down for 30 minutes after    escitalopram (LEXAPRO) 10 mg, oral, Daily    fluticasone (Flonase) 50 mcg/actuation nasal spray 1 spray, Each Nostril, 2 times daily, Shake gently. Before first use, prime pump. After use, clean tip and replace cap.    magnesium oxide (MAG-OX) 400 mg, Daily    metoprolol succinate XL (TOPROL-XL) 25 mg, oral, Daily    multivitamin tablet 1 tablet, oral, Daily    omeprazole (PRILOSEC) 40 mg, oral, Daily before breakfast    potassium chloride CR 10 mEq ER tablet 10 mEq, oral, Daily, Take with food.        Physical Exam:  General: Patient is in no acute distress.  HEENT: atraumatic normocephalic.  Neck: is supple jugular venous pressure within normal limits no thyromegaly.  Cardiovascular regular rate and rhythm normal heart sounds no murmurs rubs or gallops.  Lungs: clear to auscultation bilaterally.  Abdomen: is soft nontender.  Extremities warm to touch no edema.  Neurologic examination: patient is awake alert oriented to person, place, date/time.  Psychiatric examination: patient has good insight denies feeling suicidal and depressed.  Pulses 2+ intact bilaterally     Assessment/Plan   1 UTI with sepsis.  Patient admitted to the hospital of the mental status was found to have UTI.  Blood pressure was elevated but dropped lately.  She does have orthostatic hypotension.  At this point my recommendation is to hydrate patient.  Continue current dose metoprolol.  Will follow-up in AM.    2.  Atrial fibrillation on oral anticoagulation.  Continue small dose beta-blockers.  Agree with holding diltiazem.  Addition of oral anticoagulation.  Will follow-up in AM.    3.  Orthostatic hypotension.  Positive orthostasis currently in the setting of UTI with sepsis.  I recommend hydration we will repeat them in AM.    4.  Hypertension blood pressure fairly on the low side.  Will monitor for now.    Thank you for allowing participate in her care       Code Status:  Full Code        Nirmal Alonso MD

## 2024-05-26 NOTE — CARE PLAN
The patient's goals for the shift include Feel better    The clinical goals for the shift include Patient will use call light before getting up alone      Problem: Pain - Adult  Goal: Verbalizes/displays adequate comfort level or baseline comfort level  Outcome: Progressing     Problem: Safety - Adult  Goal: Free from fall injury  Outcome: Progressing     Problem: Discharge Planning  Goal: Discharge to home or other facility with appropriate resources  Outcome: Progressing     Problem: Chronic Conditions and Co-morbidities  Goal: Patient's chronic conditions and co-morbidity symptoms are monitored and maintained or improved  Outcome: Progressing     Problem: Fall/Injury  Goal: Not fall by end of shift  Outcome: Progressing  Goal: Be free from injury by end of the shift  Outcome: Progressing  Goal: Verbalize understanding of personal risk factors for fall in the hospital  Outcome: Progressing  Goal: Verbalize understanding of risk factor reduction measures to prevent injury from fall in the home  Outcome: Progressing  Goal: Use assistive devices by end of the shift  Outcome: Progressing  Goal: Pace activities to prevent fatigue by end of the shift  Outcome: Progressing     Problem: Pain  Goal: Takes deep breaths with improved pain control throughout the shift  Outcome: Progressing  Goal: Turns in bed with improved pain control throughout the shift  Outcome: Progressing  Goal: Walks with improved pain control throughout the shift  Outcome: Progressing  Goal: Performs ADL's with improved pain control throughout shift  Outcome: Progressing  Goal: Participates in PT with improved pain control throughout the shift  Outcome: Progressing  Goal: Free from opioid side effects throughout the shift  Outcome: Progressing  Goal: Free from acute confusion related to pain meds throughout the shift  Outcome: Progressing

## 2024-05-26 NOTE — PROGRESS NOTES
"Mercedes Castellanos is a 84 y.o. female on day 0 of admission presenting with Encephalopathy acute.    Subjective   Patient sitting on side of bed. Denies chest pain, shortness of breath, abdominal pain, fevers, chills        Objective     Physical Exam  Constitutional:       Appearance: Normal appearance.   HENT:      Head: Normocephalic and atraumatic.      Mouth/Throat:      Mouth: Mucous membranes are moist.      Pharynx: Oropharynx is clear.   Eyes:      Extraocular Movements: Extraocular movements intact.      Conjunctiva/sclera: Conjunctivae normal.      Pupils: Pupils are equal, round, and reactive to light.   Cardiovascular:      Rate and Rhythm: Normal rate and regular rhythm.      Pulses: Normal pulses.      Heart sounds: Normal heart sounds.   Pulmonary:      Effort: Pulmonary effort is normal.      Breath sounds: Normal breath sounds.   Abdominal:      General: Bowel sounds are normal.      Palpations: Abdomen is soft.      Tenderness: There is no abdominal tenderness.   Musculoskeletal:         General: Normal range of motion.      Cervical back: Normal range of motion and neck supple.   Skin:     General: Skin is warm and dry.      Capillary Refill: Capillary refill takes less than 2 seconds.   Neurological:      General: No focal deficit present.      Mental Status: She is alert and oriented to person, place, and time.   Psychiatric:         Mood and Affect: Mood normal.         Behavior: Behavior normal.         Last Recorded Vitals  Blood pressure 114/58, pulse 54, temperature 36.6 °C (97.9 °F), temperature source Oral, resp. rate 18, height 1.626 m (5' 4\"), weight 78.5 kg (173 lb 1 oz), SpO2 98%.  Intake/Output last 3 Shifts:  I/O last 3 completed shifts:  In: 2775 (35.4 mL/kg) [P.O.:940; I.V.:1835 (23.4 mL/kg)]  Out: - (0 mL/kg)   Weight: 78.5 kg     Relevant Results  Results for orders placed or performed during the hospital encounter of 05/23/24 (from the past 24 hour(s))   POCT GLUCOSE   Result " Value Ref Range    POCT Glucose 160 (H) 74 - 99 mg/dL   POCT GLUCOSE   Result Value Ref Range    POCT Glucose 125 (H) 74 - 99 mg/dL   CBC   Result Value Ref Range    WBC 7.6 4.4 - 11.3 x10*3/uL    nRBC 0.0 0.0 - 0.0 /100 WBCs    RBC 4.45 4.00 - 5.20 x10*6/uL    Hemoglobin 13.0 12.0 - 16.0 g/dL    Hematocrit 40.7 36.0 - 46.0 %    MCV 92 80 - 100 fL    MCH 29.2 26.0 - 34.0 pg    MCHC 31.9 (L) 32.0 - 36.0 g/dL    RDW 14.6 (H) 11.5 - 14.5 %    Platelets 224 150 - 450 x10*3/uL   Basic Metabolic Panel   Result Value Ref Range    Glucose 117 (H) 65 - 99 mg/dL    Sodium 142 133 - 145 mmol/L    Potassium 4.2 3.4 - 5.1 mmol/L    Chloride 108 (H) 97 - 107 mmol/L    Bicarbonate 24 24 - 31 mmol/L    Urea Nitrogen 17 8 - 25 mg/dL    Creatinine 0.80 0.40 - 1.60 mg/dL    eGFR 73 >60 mL/min/1.73m*2    Calcium 9.0 8.5 - 10.4 mg/dL    Anion Gap 10 <=19 mmol/L   POCT GLUCOSE   Result Value Ref Range    POCT Glucose 123 (H) 74 - 99 mg/dL         Assessment/Plan   Active Problems:    Anxiety    GERD without esophagitis    Hyperlipidemia    UTI (urinary tract infection)    PAF (paroxysmal atrial fibrillation) (Multi)    Essential hypertension    Acute cystitis    Metabolic encephalopathy  - Present on admission   - Resolved   - Related to UTI   - Monitor.     Urinary tract infection  - No evidence of overt sepsis  - Change to oral Cefdinir in anticipation of going home    -Culture reveals e-coli with pan susceptibility      Weakness  - Orthostatic hypotension continues .   - Continue to hold diltiazem   - Stop iv fluids   - Encourage oral fluid intake   - Check AM labs   - Continue doxycycline for abnormal CT scan   - Consult cardiology      DVT prophylaxis   - Eliquis      Worked with physical therapy and Occupational Therapy and low intensity recommended.  Plan for discharge to home when medically cleared     Rosalind Tellez, APRN-CNP

## 2024-05-27 ENCOUNTER — APPOINTMENT (OUTPATIENT)
Dept: CARDIOLOGY | Facility: HOSPITAL | Age: 84
DRG: 689 | End: 2024-05-27
Payer: MEDICARE

## 2024-05-27 PROBLEM — N12 PYELONEPHRITIS: Status: ACTIVE | Noted: 2024-05-27

## 2024-05-27 LAB
ANION GAP SERPL CALC-SCNC: 11 MMOL/L
BUN SERPL-MCNC: 13 MG/DL (ref 8–25)
CALCIUM SERPL-MCNC: 9.6 MG/DL (ref 8.5–10.4)
CHLORIDE SERPL-SCNC: 107 MMOL/L (ref 97–107)
CO2 SERPL-SCNC: 26 MMOL/L (ref 24–31)
CREAT SERPL-MCNC: 0.8 MG/DL (ref 0.4–1.6)
EGFRCR SERPLBLD CKD-EPI 2021: 73 ML/MIN/1.73M*2
ERYTHROCYTE [DISTWIDTH] IN BLOOD BY AUTOMATED COUNT: 14.6 % (ref 11.5–14.5)
GLUCOSE SERPL-MCNC: 126 MG/DL (ref 65–99)
HCT VFR BLD AUTO: 45.4 % (ref 36–46)
HGB BLD-MCNC: 14.8 G/DL (ref 12–16)
MCH RBC QN AUTO: 29.3 PG (ref 26–34)
MCHC RBC AUTO-ENTMCNC: 32.6 G/DL (ref 32–36)
MCV RBC AUTO: 90 FL (ref 80–100)
NRBC BLD-RTO: 0 /100 WBCS (ref 0–0)
PLATELET # BLD AUTO: 256 X10*3/UL (ref 150–450)
POTASSIUM SERPL-SCNC: 4.1 MMOL/L (ref 3.4–5.1)
RBC # BLD AUTO: 5.05 X10*6/UL (ref 4–5.2)
SODIUM SERPL-SCNC: 144 MMOL/L (ref 133–145)
WBC # BLD AUTO: 8 X10*3/UL (ref 4.4–11.3)

## 2024-05-27 PROCEDURE — 1200000002 HC GENERAL ROOM WITH TELEMETRY DAILY

## 2024-05-27 PROCEDURE — 36415 COLL VENOUS BLD VENIPUNCTURE: CPT | Performed by: NURSE PRACTITIONER

## 2024-05-27 PROCEDURE — 85027 COMPLETE CBC AUTOMATED: CPT | Performed by: NURSE PRACTITIONER

## 2024-05-27 PROCEDURE — 93010 ELECTROCARDIOGRAM REPORT: CPT | Performed by: INTERNAL MEDICINE

## 2024-05-27 PROCEDURE — 2500000004 HC RX 250 GENERAL PHARMACY W/ HCPCS (ALT 636 FOR OP/ED): Performed by: INTERNAL MEDICINE

## 2024-05-27 PROCEDURE — 80048 BASIC METABOLIC PNL TOTAL CA: CPT | Performed by: NURSE PRACTITIONER

## 2024-05-27 PROCEDURE — 93005 ELECTROCARDIOGRAM TRACING: CPT

## 2024-05-27 PROCEDURE — 2500000005 HC RX 250 GENERAL PHARMACY W/O HCPCS: Performed by: INTERNAL MEDICINE

## 2024-05-27 PROCEDURE — 2500000002 HC RX 250 W HCPCS SELF ADMINISTERED DRUGS (ALT 637 FOR MEDICARE OP, ALT 636 FOR OP/ED): Performed by: INTERNAL MEDICINE

## 2024-05-27 PROCEDURE — 2500000001 HC RX 250 WO HCPCS SELF ADMINISTERED DRUGS (ALT 637 FOR MEDICARE OP): Performed by: NURSE PRACTITIONER

## 2024-05-27 PROCEDURE — 99233 SBSQ HOSP IP/OBS HIGH 50: CPT | Performed by: INTERNAL MEDICINE

## 2024-05-27 PROCEDURE — 2500000004 HC RX 250 GENERAL PHARMACY W/ HCPCS (ALT 636 FOR OP/ED): Performed by: NURSE PRACTITIONER

## 2024-05-27 PROCEDURE — 2500000001 HC RX 250 WO HCPCS SELF ADMINISTERED DRUGS (ALT 637 FOR MEDICARE OP): Performed by: INTERNAL MEDICINE

## 2024-05-27 RX ORDER — METOPROLOL SUCCINATE 100 MG/1
100 TABLET, EXTENDED RELEASE ORAL DAILY
Status: DISCONTINUED | OUTPATIENT
Start: 2024-05-27 | End: 2024-05-29

## 2024-05-27 RX ORDER — METOPROLOL SUCCINATE 100 MG/1
100 TABLET, EXTENDED RELEASE ORAL DAILY
Status: DISCONTINUED | OUTPATIENT
Start: 2024-05-28 | End: 2024-05-27

## 2024-05-27 RX ADMIN — ESCITALOPRAM OXALATE 10 MG: 10 TABLET ORAL at 10:07

## 2024-05-27 RX ADMIN — ASCORBIC ACID, THIAMINE MONONITRATE,RIBOFLAVIN, NIACINAMIDE, PYRIDOXINE HYDROCHLORIDE, FOLIC ACID, CYANOCOBALAMIN, BIOTIN, CALCIUM PANTOTHENATE, 1 CAPSULE: 100; 1.5; 1.7; 20; 10; 1; 6000; 150000; 5 CAPSULE, LIQUID FILLED ORAL at 10:07

## 2024-05-27 RX ADMIN — CEFDINIR 300 MG: 300 CAPSULE ORAL at 10:07

## 2024-05-27 RX ADMIN — Medication 400 MG: at 10:07

## 2024-05-27 RX ADMIN — PANTOPRAZOLE SODIUM 40 MG: 40 TABLET, DELAYED RELEASE ORAL at 10:07

## 2024-05-27 RX ADMIN — APIXABAN 5 MG: 5 TABLET, FILM COATED ORAL at 21:39

## 2024-05-27 RX ADMIN — DOXYCYCLINE HYCLATE 100 MG: 100 CAPSULE ORAL at 21:39

## 2024-05-27 RX ADMIN — POTASSIUM CHLORIDE 10 MEQ: 750 TABLET, EXTENDED RELEASE ORAL at 10:07

## 2024-05-27 RX ADMIN — SODIUM CHLORIDE 100 ML/HR: 900 INJECTION, SOLUTION INTRAVENOUS at 05:26

## 2024-05-27 RX ADMIN — CEFDINIR 300 MG: 300 CAPSULE ORAL at 21:39

## 2024-05-27 RX ADMIN — DOXYCYCLINE HYCLATE 100 MG: 100 CAPSULE ORAL at 10:07

## 2024-05-27 RX ADMIN — LIDOCAINE 1 PATCH: 4 PATCH TOPICAL at 10:05

## 2024-05-27 RX ADMIN — APIXABAN 5 MG: 5 TABLET, FILM COATED ORAL at 10:07

## 2024-05-27 RX ADMIN — METOPROLOL SUCCINATE 100 MG: 100 TABLET, EXTENDED RELEASE ORAL at 16:53

## 2024-05-27 ASSESSMENT — PAIN SCALES - GENERAL
PAINLEVEL_OUTOF10: 0 - NO PAIN
PAINLEVEL_OUTOF10: 0 - NO PAIN

## 2024-05-27 NOTE — NURSING NOTE
Pt watching TV, in no distress, denies pain and discomfort, pt alert, a new 20g IV placed in RFA, tolerated very well, IV NS @100ml/hr, no complain of discomfort at site, safety protocol in place, will continue to monitor.

## 2024-05-27 NOTE — CARE PLAN
Problem: Pain - Adult  Goal: Verbalizes/displays adequate comfort level or baseline comfort level  Outcome: Progressing     Problem: Safety - Adult  Goal: Free from fall injury  Outcome: Progressing     Problem: Discharge Planning  Goal: Discharge to home or other facility with appropriate resources  Outcome: Progressing     Problem: Chronic Conditions and Co-morbidities  Goal: Patient's chronic conditions and co-morbidity symptoms are monitored and maintained or improved  Outcome: Progressing     Problem: Fall/Injury  Goal: Not fall by end of shift  Outcome: Progressing  Goal: Be free from injury by end of the shift  Outcome: Progressing  Goal: Verbalize understanding of personal risk factors for fall in the hospital  Outcome: Progressing  Goal: Verbalize understanding of risk factor reduction measures to prevent injury from fall in the home  Outcome: Progressing  Goal: Use assistive devices by end of the shift  Outcome: Progressing  Goal: Pace activities to prevent fatigue by end of the shift  Outcome: Progressing     Problem: Pain  Goal: Takes deep breaths with improved pain control throughout the shift  Outcome: Progressing  Goal: Turns in bed with improved pain control throughout the shift  Outcome: Progressing  Goal: Walks with improved pain control throughout the shift  Outcome: Progressing  Goal: Performs ADL's with improved pain control throughout shift  Outcome: Progressing  Goal: Participates in PT with improved pain control throughout the shift  Outcome: Progressing  Goal: Free from opioid side effects throughout the shift  Outcome: Progressing  Goal: Free from acute confusion related to pain meds throughout the shift  Outcome: Progressing   The patient's goals for the shift include Feel better    The clinical goals for the shift include Patient will use call light appropriately

## 2024-05-27 NOTE — NURSING NOTE
1655 Patients heart rate in the 180's. EKG obtained, showed A-fib RVR. TILA Taylor gave verbal order to administer Metoprolol 100mg po times one and keep patient in bed.

## 2024-05-27 NOTE — PROGRESS NOTES
Mercedes Castellanos is a 84 y.o. female on day 0 of admission presenting with Encephalopathy acute.      Subjective   Patient sitting up in a chair. Feels overall improved from yesterday. Wants to go home. Seen by cardiology today, medications were adjusted. Patient denies chest pain, shortness of breath, fevers, chills, nausea, or vomiting. Denies lightheadedness or dizziness.         Objective     Last Recorded Vitals  BP (!) 170/98 (BP Location: Right arm, Patient Position: Lying)   Pulse 99   Temp 36.7 °C (98.1 °F) (Oral)   Resp 18   Wt 78.5 kg (173 lb 1 oz)   SpO2 99%   Intake/Output last 3 Shifts:    Intake/Output Summary (Last 24 hours) at 5/27/2024 1006  Last data filed at 5/27/2024 0526  Gross per 24 hour   Intake 2605.42 ml   Output --   Net 2605.42 ml       Admission Weight  Weight: 79.4 kg (175 lb) (05/23/24 1839)    Daily Weight  05/24/24 : 78.5 kg (173 lb 1 oz)    Image Results  ECG 12 lead  Atrial fibrillation  Nonspecific T wave abnormality  Abnormal ECG  When compared with ECG of 19-OCT-2017 19:48,  Atrial fibrillation has replaced Sinus rhythm  Vent. rate has increased BY  41 BPM  Nonspecific T wave abnormality, worse in Lateral leads      Physical Exam  Vitals reviewed.   Constitutional:       Appearance: Normal appearance.   HENT:      Head: Normocephalic and atraumatic.   Eyes:      Extraocular Movements: Extraocular movements intact.      Conjunctiva/sclera: Conjunctivae normal.   Cardiovascular:      Rate and Rhythm: Normal rate and regular rhythm.   Pulmonary:      Effort: Pulmonary effort is normal.      Breath sounds: Normal breath sounds. No wheezing, rhonchi or rales.   Abdominal:      General: Bowel sounds are normal.      Palpations: Abdomen is soft.      Tenderness: There is no abdominal tenderness.   Skin:     General: Skin is warm and dry.   Neurological:      General: No focal deficit present.      Mental Status: She is alert and oriented to person, place, and time.          Relevant Results  Lab Results   Component Value Date    GLUCOSE 126 (H) 05/27/2024    CALCIUM 9.6 05/27/2024     05/27/2024    K 4.1 05/27/2024    CO2 26 05/27/2024     05/27/2024    BUN 13 05/27/2024    CREATININE 0.80 05/27/2024     Lab Results   Component Value Date    WBC 8.0 05/27/2024    HGB 14.8 05/27/2024    HCT 45.4 05/27/2024    MCV 90 05/27/2024     05/27/2024     ECG 12 lead  Result Date: 5/24/2024  Atrial fibrillation Nonspecific T wave abnormality Abnormal ECG When compared with ECG of 19-OCT-2017 19:48, Atrial fibrillation has replaced Sinus rhythm Vent. rate has increased BY  41 BPM Nonspecific T wave abnormality, worse in Lateral leads    CT abdomen pelvis wo IV contrast  Result Date: 5/23/2024  Under distention of the urinary bladder limits evaluation however correlate for cystitis suggested. No evidence of obstructive nephropathy identified.   Diverticulosis, moderate hiatal hernia, trees pericardial effusion and additional findings as detailed.   MACRO: None   Signed by: Nuha Pulido 5/23/2024 9:02 PM Dictation workstation:   [id number]    CT head wo IV contrast  Result Date: 5/23/2024  No acute intracranial hemorrhage or mass effect.   Nonspecific white matter changes and parenchymal loss.   MACRO: None.   Signed by: Nuha Pulido 5/23/2024 8:54 PM Dictation workstation:   [id number]    XR chest 1 view  Result Date: 5/23/2024  Cardiomegaly with small hiatal hernia. No other acute intrathoracic abnormality.   Signed by: Emiliano Bonilla 5/23/2024 8:02 PM Dictation workstation:   [id number]          Assessment/Plan      Active Problems:    Anxiety    GERD without esophagitis    Hyperlipidemia    UTI (urinary tract infection)    PAF (paroxysmal atrial fibrillation) (Multi)    Essential hypertension    Acute cystitis         Urinary tract infection  Continue oral Cefdinir   Culture reveals e-coli with pan susceptibility      Metabolic encephalopathy-resolved  Secondary  to UTI    Orthostatic Hypotension  Diltiazem was held yesterday  Given IV fluids, stopped by cardiology today  Now with elevated blood pressure, medications have been adjusted by cardiology  Repeat orthostatic vitals today  Monitor on tele    Possible bacterial pneumonia  Stable on room air  Currently afebrile, no respiratory complaints  Was confused on admission however this has resolved  Continue oral doxycycline    Trace pericardial effusion  Seen on CT  Will check echocardiogram  Cardiology is on consult    Weakness  Fall precautions  PT/OT     DVT prophylaxis   Eliquis     Plan  Cardiac medications being adjusted by cardiology  Echocardiogram today  Continue ATB  Check orthostatic vitals  Monitor on tele  Worked with physical therapy and Occupational Therapy and low intensity recommended.  Plan for discharge to home when medically cleared   Anticipate discharge in the next 24-48 hours              Claudia Ho, APRN-CNP

## 2024-05-27 NOTE — CARE PLAN
The patient's goals for the shift include Feel better    The clinical goals for the shift include Safety, able to utilize call light

## 2024-05-27 NOTE — PROGRESS NOTES
Subjective Data:  Patient is feeling okay.  Denies having chest pain.  No shortness of breath or palpitations.  No dizziness.    Overnight Events:    Telemetry reviewed no events     Objective Data:  Last Recorded Vitals:  Vitals:    05/26/24 1611 05/27/24 0009 05/27/24 0748 05/27/24 0817   BP: (!) 150/91 151/90 (!) 162/95 (!) 170/98   BP Location: Right arm Right arm Right arm Right arm   Patient Position: Lying Lying Lying Lying   Pulse: 92 94 99    Resp: 18 18 18 18   Temp: 36.9 °C (98.4 °F) 36.2 °C (97.2 °F) 36.7 °C (98.1 °F)    TempSrc: Oral Temporal Oral    SpO2: 98% 96% 97% 99%   Weight:       Height:           Last Labs:  CBC - 5/27/2024:  7:16 AM  8.0 14.8 256    45.4      CMP - 5/27/2024:  7:17 AM  9.6 7.8 19 --- 0.5   _ 4.0 9 85      PTT - No results in last year.  _   _ _     BNP   Date/Time Value Ref Range Status   10/19/2017 11:51  0 - 99 pg/mL Final     Comment:     .  <100 pg/mL - Heart failure unlikely  100-299 pg/mL - Intermediate probability of acute heart  .               failure exacerbation. Correlate with clinical  .               context and patient history.    >=300 pg/mL - Heart Failure likely. Correlate with clinical  .               context and patient history.  BNP testing is performed using different testing   methodology at [facility] than at other   [facility] hospitals. Direct result comparisons should   only be made within the same method.       VLDL   Date/Time Value Ref Range Status   01/27/2023 11:16 AM 13 0 - 40 mg/dL Final   03/16/2021 10:05 AM 13 0 - 40 mg/dL Final   09/01/2020 10:36 AM 19 0 - 40 mg/dL Final      Last I/O:  I/O last 3 completed shifts:  In: 3084.2 (39.3 mL/kg) [P.O.:965; I.V.:2119.2 (27 mL/kg)]  Out: - (0 mL/kg)   Weight: 78.5 kg     Past Cardiology Tests (Last 3 Years):  EKG:  ECG 12 lead 05/23/2024 (Preliminary)    Echo:  No results found for this or any previous visit from the past 1095 days.    Ejection Fractions:  No results found for:  "\"EF\"  Cath:  No results found for this or any previous visit from the past 1095 days.    Stress Test:  No results found for this or any previous visit from the past 1095 days.    Cardiac Imaging:  No results found for this or any previous visit from the past 1095 days.      Inpatient Medications:  Scheduled medications   Medication Dose Route Frequency    apixaban  5 mg oral BID    B complex-vitamin C-folic acid  1 capsule oral Daily    cefdinir  300 mg oral BID    [Held by provider] dilTIAZem  120 mg oral q12h    doxycylcine  100 mg oral q12h FRANCESCA    escitalopram  10 mg oral Daily    lidocaine  1 patch transdermal Daily    magnesium oxide  400 mg oral Daily    metoprolol succinate XL  25 mg oral Daily    pantoprazole  40 mg oral Daily    potassium chloride CR  10 mEq oral Daily     PRN medications   Medication    acetaminophen    benzocaine-menthol    dextromethorphan-guaifenesin    guaiFENesin    ondansetron    Or    ondansetron    polyethylene glycol     Continuous Medications   Medication Dose Last Rate    sodium chloride 0.9%  100 mL/hr Stopped (05/27/24 0748)       Physical Exam:  General: Patient is in no acute distress.  HEENT: atraumatic normocephalic.  Neck: is supple jugular venous pressure within normal limits no thyromegaly.  Cardiovascular regular rate and rhythm normal heart sounds no murmurs rubs or gallops.  Lungs: clear to auscultation bilaterally.  Abdomen: is soft nontender.  Extremities warm to touch no edema.          Assessment/Plan   1 UTI with sepsis.  Patient admitted to the hospital of the mental status was found to have UTI.  Blood pressure was elevated but dropped lately.  She does have orthostatic hypotension.  At this point my recommendation is to hydrate patient.  Continue current dose metoprolol.  Will follow-up in AM.     2.  Atrial fibrillation on oral anticoagulation.  Continue small dose beta-blockers.  My recommendation is to stop diltiazem and uptitrate metoprolol will increase " to 200 mg oral daily for now.  Blood pressure is elevated today.  So stop IV fluids.     3.  Orthostatic hypotension.  Positive orthostasis currently in the setting of UTI with sepsis.  Stop IV fluids since patient blood pressure is elevated.  We will increase metoprolol to 100 mg oral daily we will stop diltiazem for now.  Repeat orthostatics today.      4.  Hypertension as above     Thank you for allowing participate in her care       Code Status:  Full Code      Nirmal Alonso MD

## 2024-05-28 ENCOUNTER — APPOINTMENT (OUTPATIENT)
Dept: CARDIOLOGY | Facility: HOSPITAL | Age: 84
DRG: 689 | End: 2024-05-28
Payer: MEDICARE

## 2024-05-28 ENCOUNTER — DOCUMENTATION (OUTPATIENT)
Dept: RESEARCH | Age: 84
End: 2024-05-28
Payer: MEDICARE

## 2024-05-28 LAB
ANION GAP SERPL CALC-SCNC: 12 MMOL/L
ATRIAL RATE: 84 BPM
BUN SERPL-MCNC: 14 MG/DL (ref 8–25)
CALCIUM SERPL-MCNC: 9.8 MG/DL (ref 8.5–10.4)
CHLORIDE SERPL-SCNC: 107 MMOL/L (ref 97–107)
CO2 SERPL-SCNC: 24 MMOL/L (ref 24–31)
CREAT SERPL-MCNC: 0.8 MG/DL (ref 0.4–1.6)
EGFRCR SERPLBLD CKD-EPI 2021: 73 ML/MIN/1.73M*2
ERYTHROCYTE [DISTWIDTH] IN BLOOD BY AUTOMATED COUNT: 14.5 % (ref 11.5–14.5)
GLUCOSE SERPL-MCNC: 123 MG/DL (ref 65–99)
HCT VFR BLD AUTO: 44.4 % (ref 36–46)
HGB BLD-MCNC: 14.8 G/DL (ref 12–16)
MCH RBC QN AUTO: 29.6 PG (ref 26–34)
MCHC RBC AUTO-ENTMCNC: 33.3 G/DL (ref 32–36)
MCV RBC AUTO: 89 FL (ref 80–100)
NRBC BLD-RTO: 0 /100 WBCS (ref 0–0)
PLATELET # BLD AUTO: 262 X10*3/UL (ref 150–450)
POTASSIUM SERPL-SCNC: 3.9 MMOL/L (ref 3.4–5.1)
Q ONSET: 214 MS
Q ONSET: 225 MS
QRS COUNT: 15 BEATS
QRS COUNT: 20 BEATS
QRS DURATION: 78 MS
QRS DURATION: 82 MS
QT INTERVAL: 324 MS
QT INTERVAL: 358 MS
QTC CALCULATION(BAZETT): 445 MS
QTC CALCULATION(BAZETT): 457 MS
QTC FREDERICIA: 408 MS
QTC FREDERICIA: 414 MS
R AXIS: 32 DEGREES
R AXIS: 58 DEGREES
RBC # BLD AUTO: 5 X10*6/UL (ref 4–5.2)
SODIUM SERPL-SCNC: 143 MMOL/L (ref 133–145)
T AXIS: 27 DEGREES
T AXIS: 27 DEGREES
T OFFSET: 387 MS
T OFFSET: 393 MS
VENTRICULAR RATE: 120 BPM
VENTRICULAR RATE: 93 BPM
WBC # BLD AUTO: 8.2 X10*3/UL (ref 4.4–11.3)

## 2024-05-28 PROCEDURE — 93306 TTE W/DOPPLER COMPLETE: CPT | Performed by: INTERNAL MEDICINE

## 2024-05-28 PROCEDURE — 99232 SBSQ HOSP IP/OBS MODERATE 35: CPT | Performed by: INTERNAL MEDICINE

## 2024-05-28 PROCEDURE — 2500000004 HC RX 250 GENERAL PHARMACY W/ HCPCS (ALT 636 FOR OP/ED): Performed by: INTERNAL MEDICINE

## 2024-05-28 PROCEDURE — 2500000001 HC RX 250 WO HCPCS SELF ADMINISTERED DRUGS (ALT 637 FOR MEDICARE OP): Performed by: INTERNAL MEDICINE

## 2024-05-28 PROCEDURE — 2500000005 HC RX 250 GENERAL PHARMACY W/O HCPCS: Performed by: INTERNAL MEDICINE

## 2024-05-28 PROCEDURE — 85027 COMPLETE CBC AUTOMATED: CPT | Performed by: NURSE PRACTITIONER

## 2024-05-28 PROCEDURE — 97535 SELF CARE MNGMENT TRAINING: CPT | Mod: GO,CO

## 2024-05-28 PROCEDURE — 36415 COLL VENOUS BLD VENIPUNCTURE: CPT | Performed by: NURSE PRACTITIONER

## 2024-05-28 PROCEDURE — 93306 TTE W/DOPPLER COMPLETE: CPT

## 2024-05-28 PROCEDURE — 2500000004 HC RX 250 GENERAL PHARMACY W/ HCPCS (ALT 636 FOR OP/ED): Performed by: NURSE PRACTITIONER

## 2024-05-28 PROCEDURE — 80048 BASIC METABOLIC PNL TOTAL CA: CPT | Performed by: NURSE PRACTITIONER

## 2024-05-28 PROCEDURE — 2500000002 HC RX 250 W HCPCS SELF ADMINISTERED DRUGS (ALT 637 FOR MEDICARE OP, ALT 636 FOR OP/ED): Performed by: INTERNAL MEDICINE

## 2024-05-28 PROCEDURE — 2500000001 HC RX 250 WO HCPCS SELF ADMINISTERED DRUGS (ALT 637 FOR MEDICARE OP): Performed by: NURSE PRACTITIONER

## 2024-05-28 PROCEDURE — 1200000002 HC GENERAL ROOM WITH TELEMETRY DAILY

## 2024-05-28 RX ORDER — SODIUM CHLORIDE 9 MG/ML
75 INJECTION, SOLUTION INTRAVENOUS CONTINUOUS
Status: DISCONTINUED | OUTPATIENT
Start: 2024-05-28 | End: 2024-05-28

## 2024-05-28 RX ORDER — SODIUM CHLORIDE 9 MG/ML
75 INJECTION, SOLUTION INTRAVENOUS CONTINUOUS
Status: ACTIVE | OUTPATIENT
Start: 2024-05-28 | End: 2024-05-30

## 2024-05-28 RX ADMIN — ESCITALOPRAM OXALATE 10 MG: 10 TABLET ORAL at 10:16

## 2024-05-28 RX ADMIN — SODIUM CHLORIDE 75 ML/HR: 900 INJECTION, SOLUTION INTRAVENOUS at 15:30

## 2024-05-28 RX ADMIN — APIXABAN 5 MG: 5 TABLET, FILM COATED ORAL at 10:16

## 2024-05-28 RX ADMIN — Medication 400 MG: at 10:16

## 2024-05-28 RX ADMIN — APIXABAN 5 MG: 5 TABLET, FILM COATED ORAL at 21:49

## 2024-05-28 RX ADMIN — METOPROLOL SUCCINATE 100 MG: 100 TABLET, EXTENDED RELEASE ORAL at 10:16

## 2024-05-28 RX ADMIN — ASCORBIC ACID, THIAMINE MONONITRATE,RIBOFLAVIN, NIACINAMIDE, PYRIDOXINE HYDROCHLORIDE, FOLIC ACID, CYANOCOBALAMIN, BIOTIN, CALCIUM PANTOTHENATE, 1 CAPSULE: 100; 1.5; 1.7; 20; 10; 1; 6000; 150000; 5 CAPSULE, LIQUID FILLED ORAL at 10:16

## 2024-05-28 RX ADMIN — DILTIAZEM HYDROCHLORIDE 120 MG: 60 TABLET ORAL at 10:16

## 2024-05-28 RX ADMIN — CEFDINIR 300 MG: 300 CAPSULE ORAL at 21:49

## 2024-05-28 RX ADMIN — LIDOCAINE 1 PATCH: 4 PATCH TOPICAL at 10:16

## 2024-05-28 RX ADMIN — DOXYCYCLINE HYCLATE 100 MG: 100 CAPSULE ORAL at 10:16

## 2024-05-28 RX ADMIN — PANTOPRAZOLE SODIUM 40 MG: 40 TABLET, DELAYED RELEASE ORAL at 10:16

## 2024-05-28 RX ADMIN — POTASSIUM CHLORIDE 10 MEQ: 750 TABLET, EXTENDED RELEASE ORAL at 10:16

## 2024-05-28 RX ADMIN — CEFDINIR 300 MG: 300 CAPSULE ORAL at 10:16

## 2024-05-28 ASSESSMENT — PAIN - FUNCTIONAL ASSESSMENT: PAIN_FUNCTIONAL_ASSESSMENT: 0-10

## 2024-05-28 ASSESSMENT — COGNITIVE AND FUNCTIONAL STATUS - GENERAL: DAILY ACTIVITIY SCORE: 24

## 2024-05-28 ASSESSMENT — ACTIVITIES OF DAILY LIVING (ADL): HOME_MANAGEMENT_TIME_ENTRY: 10

## 2024-05-28 ASSESSMENT — PAIN SCALES - GENERAL
PAINLEVEL_OUTOF10: 0 - NO PAIN
PAINLEVEL_OUTOF10: 0 - NO PAIN

## 2024-05-28 NOTE — PROGRESS NOTES
Pt switched to PO antibiotics. Echo ordered. Cardiology adjusted medications. Possible DC today or tomorrow. Pt will not have any skilled needs at this time. Pt is declining any HHC or SNF.     DISCHARGE PLAN TO RETURN HOME NO SKILLED NEEDS.        05/28/24 1254   Discharge Planning   Type of Residence Private residence   Who is requesting discharge planning? Provider   Home or Post Acute Services None   Patient expects to be discharged to: HOME   Does the patient need discharge transport arranged? No   Patient Choice   Provider Choice list and CMS website (https://medicare.gov/care-compare#search) for post-acute Quality and Resource Measure Data were provided and reviewed with: Patient   Patient / Family choosing to utilize agency / facility established prior to hospitalization No

## 2024-05-28 NOTE — PROGRESS NOTES
Spiritual Care Visit    Clinical Encounter Type  Visited With: Patient  Routine Visit: Follow-up  Continue Visiting: Yes         Values/Beliefs  Spiritual Requests During Hospitalization: Communion today    Sacramental Encounters  Communion: Patient wants communion  Communion Given Indicator: Yes     Thomas Puentes

## 2024-05-28 NOTE — CARE PLAN
The patient's goals for the shift include Feel better    The clinical goals for the shift include control heartrate          Problem: Pain - Adult  Goal: Verbalizes/displays adequate comfort level or baseline comfort level  Outcome: Progressing     Problem: Safety - Adult  Goal: Free from fall injury  Outcome: Progressing     Problem: Discharge Planning  Goal: Discharge to home or other facility with appropriate resources  Outcome: Progressing     Problem: Chronic Conditions and Co-morbidities  Goal: Patient's chronic conditions and co-morbidity symptoms are monitored and maintained or improved  Outcome: Progressing     Problem: Fall/Injury  Goal: Not fall by end of shift  Outcome: Progressing  Goal: Be free from injury by end of the shift  Outcome: Progressing  Goal: Verbalize understanding of personal risk factors for fall in the hospital  Outcome: Progressing  Goal: Verbalize understanding of risk factor reduction measures to prevent injury from fall in the home  Outcome: Progressing  Goal: Use assistive devices by end of the shift  Outcome: Progressing  Goal: Pace activities to prevent fatigue by end of the shift  Outcome: Progressing     Problem: Pain  Goal: Takes deep breaths with improved pain control throughout the shift  Outcome: Progressing  Goal: Turns in bed with improved pain control throughout the shift  Outcome: Progressing  Goal: Walks with improved pain control throughout the shift  Outcome: Progressing  Goal: Performs ADL's with improved pain control throughout shift  Outcome: Progressing  Goal: Participates in PT with improved pain control throughout the shift  Outcome: Progressing  Goal: Free from opioid side effects throughout the shift  Outcome: Progressing  Goal: Free from acute confusion related to pain meds throughout the shift  Outcome: Progressing

## 2024-05-28 NOTE — PROGRESS NOTES
Mercedes Castellanos is a 84 y.o. female on day 1 of admission presenting with Encephalopathy acute.      Subjective   Patient seen and examined. Awake/alert/oriented. Denies chest pain, shortness of breath, fevers, chills, nausea, or vomiting. Denies lightheadedness or dizziness. She did have one episode of A fib RVR overnight, morning metoprolol had been held, increased dose ordered by cardiology was given by RN with no further issues overnight.            Objective     Last Recorded Vitals  BP (!) 172/99 (BP Location: Left arm, Patient Position: Lying)   Pulse 82   Temp 37 °C (98.6 °F) (Oral)   Resp 18   Wt 78.5 kg (173 lb 1 oz)   SpO2 97%   Intake/Output last 3 Shifts:    Intake/Output Summary (Last 24 hours) at 5/28/2024 1253  Last data filed at 5/27/2024 1738  Gross per 24 hour   Intake 625 ml   Output --   Net 625 ml       Admission Weight  Weight: 79.4 kg (175 lb) (05/23/24 1839)    Daily Weight  05/24/24 : 78.5 kg (173 lb 1 oz)    Image Results  Electrocardiogram, 12-lead PRN ACS symptoms  Atrial fibrillation with rapid ventricular response  Abnormal ECG  When compared with ECG of 23-MAY-2024 19:37, (unconfirmed)  No significant change was found      Physical Exam  Vitals reviewed.   Constitutional:       Appearance: Normal appearance.   HENT:      Head: Normocephalic and atraumatic.   Eyes:      Extraocular Movements: Extraocular movements intact.      Conjunctiva/sclera: Conjunctivae normal.   Cardiovascular:      Rate and Rhythm: Normal rate and regular rhythm.   Pulmonary:      Effort: Pulmonary effort is normal.      Breath sounds: Normal breath sounds. No wheezing, rhonchi or rales.   Abdominal:      General: Bowel sounds are normal.      Palpations: Abdomen is soft.      Tenderness: There is no abdominal tenderness.   Skin:     General: Skin is warm and dry.   Neurological:      General: No focal deficit present.      Mental Status: She is alert and oriented to person, place, and time.          Relevant Results  Lab Results   Component Value Date    GLUCOSE 123 (H) 05/28/2024    CALCIUM 9.8 05/28/2024     05/28/2024    K 3.9 05/28/2024    CO2 24 05/28/2024     05/28/2024    BUN 14 05/28/2024    CREATININE 0.80 05/28/2024     Lab Results   Component Value Date    WBC 8.2 05/28/2024    HGB 14.8 05/28/2024    HCT 44.4 05/28/2024    MCV 89 05/28/2024     05/28/2024     ECG 12 lead  Result Date: 5/24/2024  Atrial fibrillation Nonspecific T wave abnormality Abnormal ECG When compared with ECG of 19-OCT-2017 19:48, Atrial fibrillation has replaced Sinus rhythm Vent. rate has increased BY  41 BPM Nonspecific T wave abnormality, worse in Lateral leads    CT abdomen pelvis wo IV contrast  Result Date: 5/23/2024  Under distention of the urinary bladder limits evaluation however correlate for cystitis suggested. No evidence of obstructive nephropathy identified.   Diverticulosis, moderate hiatal hernia, trees pericardial effusion and additional findings as detailed.   MACRO: None   Signed by: Nuha Pulido 5/23/2024 9:02 PM Dictation workstation:   [id number]    CT head wo IV contrast  Result Date: 5/23/2024  No acute intracranial hemorrhage or mass effect.   Nonspecific white matter changes and parenchymal loss.   MACRO: None.   Signed by: Nuha Pulido 5/23/2024 8:54 PM Dictation workstation:   [id number]    XR chest 1 view  Result Date: 5/23/2024  Cardiomegaly with small hiatal hernia. No other acute intrathoracic abnormality.   Signed by: Emiliano Bonilla 5/23/2024 8:02 PM Dictation workstation:   [id number]          Assessment/Plan      Active Problems:    Anxiety    GERD without esophagitis    Hyperlipidemia    UTI (urinary tract infection)    PAF (paroxysmal atrial fibrillation) (Multi)    Essential hypertension    Acute cystitis    Pyelonephritis         Urinary tract infection  Continue oral Cefdinir   Culture reveals e-coli with pan susceptibility      Metabolic  encephalopathy-resolved  Secondary to UTI    Orthostatic Hypotension  Diltiazem was held yesterday  Given IV fluids, stopped by cardiology today  Now with elevated blood pressure, medications have been adjusted by cardiology  Repeat orthostatic vitals today  Monitor on tele    Abnormal CT chest  Stable on room air  Currently afebrile, no respiratory complaints  Observe off oral doxycycline    Trace pericardial effusion  Seen on CT  Will check echocardiogram  Cardiology is on consult    Weakness  Fall precautions  PT/OT     DVT prophylaxis   Eliquis     Plan  Cardiac medications being adjusted by cardiology  Echocardiogram today  Continue ATB  Check orthostatic vitals  Monitor on tele  Worked with physical therapy and Occupational Therapy and low intensity recommended.  Plan for discharge to home when medically cleared   Anticipate discharge in the next 24-48 hours              Claudia Ho, APRN-CNP

## 2024-05-28 NOTE — RESEARCH NOTES
Artificial Intelligence Monitoring in Nursing (AIMS Nursing) Study    Principle Investigator - Dr. Jeovanny Ng  Research Coordinator - [employer] Cabral     Patient Name - Mercedes Castellanos  Date - 5/28/2024 6:42 PM  Location - Lars Castellanos was approached by [employer] Cabral to talk about participating in the AIMS Nursing Study. The patient declined to participate in the study. Study protocol was followed and patient was given study contact information.     MU Cabral

## 2024-05-28 NOTE — PROGRESS NOTES
Subjective Data:  Patient stable cardiac wise no chest pain or tightness.  Patient currently on Eliquis, and antibiotic with Toprol-XL 25 mg once a day.  Continue current antibiotic for the UTI.  Atrial fibrillation is under control.  Patient does have low blood pressure the moment. Check orthostatic blood pressure.  Overnight Events:    None  Objective   Last Recorded Vitals  BP (!) 73/46 (BP Location: Left arm, Patient Position: Standing) Comment: ortho vitals  Pulse 63   Temp 36.8 °C (98.2 °F) (Oral)   Resp 18   Wt 78.5 kg (173 lb 1 oz)   SpO2 97%     Intake/Output Summary (Last 24 hours) at 5/28/2024 1514  Last data filed at 5/28/2024 0750  Gross per 24 hour   Intake 325 ml   Output 1 ml   Net 324 ml     Physical Exam:  HEENT: Normocephalic/atraumatic pupils equal react light  Neck exam mild JVD, no bruit  Lung exam clear to auscultation, few crackles at the bases  Cardiac exam is irregular rhythm S1-S2, soft systolic murmur heard.   Abdomen soft nontender, nondistended  Extremities no clubbing, cyanosis, trace edema  Neuro exam grossly intact.  Image Results  Electrocardiogram, 12-lead PRN ACS symptoms  Atrial fibrillation with rapid ventricular response  Abnormal ECG  When compared with ECG of 23-MAY-2024 19:37, (unconfirmed)  No significant change was found    Last Labs:  CBC - 5/28/2024:  6:13 AM  8.2 14.8 262    44.4      CMP - 5/28/2024:  6:14 AM  9.8 7.8 19 --- 0.5   _ 4.0 9 85      PTT - No results in last year.  _   _ _     Inpatient Medications:  Scheduled medications   Medication Dose Route Frequency    apixaban  5 mg oral BID    B complex-vitamin C-folic acid  1 capsule oral Daily    cefdinir  300 mg oral BID    escitalopram  10 mg oral Daily    lidocaine  1 patch transdermal Daily    magnesium oxide  400 mg oral Daily    metoprolol succinate XL  100 mg oral Daily    pantoprazole  40 mg oral Daily    potassium chloride CR  10 mEq oral Daily     Active Problems:    Anxiety    GERD without  esophagitis    Hyperlipidemia    UTI (urinary tract infection)    PAF (paroxysmal atrial fibrillation) (Multi)    Essential hypertension    Acute cystitis    Pyelonephritis    Assessment/Plan   Patient has above underlying history of UTI, atrial fibrillation, continue IV antibiotic.  Continue current rate control medications.  Check orthostatic blood pressure.    Pt. care time is spent includes review of diagnostic tests, labs, radiographs, EKGs, old echoes, cardiac work-up and coordination of care. Assessment, impression and plans are reflected in the note above as well as the orders.    Code Status:  Full Code  I spent 35 minutes in the professional and overall care of this patient.  Tor Medley MD

## 2024-05-28 NOTE — PROGRESS NOTES
Occupational Therapy    OT Treatment    Patient Name: Mercedes Castellanos  MRN: [id number]  Today's Date: 5/28/2024  Time Calculation  Start Time: 1420  Stop Time: 1430  Time Calculation (min): 10 min         Assessment:  OT Assessment: Pt demonstrating independence with all ADL tasks and functional mobility, meeting all goals at this time. Pt expressing no concerns for homegoing-will dicontinue OT  End of Session Communication: Bedside nurse  End of Session Patient Position: Up in chair     Plan:  Treatment Interventions: ADL retraining, Functional transfer training, UE strengthening/ROM, Endurance training, Cognitive reorientation, Patient/family training, Equipment evaluation/education  OT Frequency: 3 times per week  OT Discharge Recommendations: Low intensity level of continued care, 24 hr supervision due to cognition  OT Recommended Transfer Status: Stand by assist  OT - OK to Discharge: Yes  Treatment Interventions: ADL retraining, Functional transfer training, UE strengthening/ROM, Endurance training, Cognitive reorientation, Patient/family training, Equipment evaluation/education    Subjective   Previous Visit Info:  OT Last Visit  OT Received On: 05/28/24  General:  General  Prior to Session Communication: Bedside nurse  Patient Position Received: Bed, 3 rail up, Alarm off, not on at start of session  General Comment: Cleared for therapy per RN. Pt supine in bed upon arrival and agreeable to tx  Precautions:  Hearing/Visual Limitations: glasses for reading.  Medical Precautions: Fall precautions  Vital Signs:  Vital Signs  Heart Rate: 63  BP:  (+orthos)  Pain:  Pain Assessment  Pain Assessment: 0-10  Pain Score: 0 - No pain    Objective    Cognition:  Cognition  Overall Cognitive Status: Within Functional Limits (h/o dementia)    Activities of Daily Living:    LE Dressing  LE Dressing: Yes  Sock Level of Assistance: Modified independent  LE Dressing Comments: don/doff socks with use of figure four dressing  technique    Bed Mobility/Transfers: Bed Mobility  Bed Mobility: Yes  Bed Mobility 1  Bed Mobility 1: Supine to sitting  Level of Assistance 1: Independent  Bed Mobility Comments 1: increased effort required, expressing no dizziness once seated EOB    Transfers  Transfer: Yes  Transfer 1  Technique 1: Sit to stand, Stand to sit  Transfer Level of Assistance 1: Independent  Trials/Comments 1: demonstrating good transfer technique    Toilet Transfers  Toilet Transfer Type: To and from  Toilet Transfer to: Standard toilet  Toilet Transfers: Modified independence  Toilet Transfers Comments: use of grab bar for UE support    Functional Mobility:  Functional Mobility  Functional Mobility Performed: Yes  Functional Mobility 1  Assistance 1: Independent  Comments 1: functional mobility a short household distance, demonstrating good balance throughout and expressing she has been independently utilizing restroom    Outcome Measures:[facility] Daily Activity  Putting on and taking off regular lower body clothing: None  Bathing (including washing, rinsing, drying): None  Putting on and taking off regular upper body clothing: None  Toileting, which includes using toilet, bedpan or urinal: None  Taking care of personal grooming such as brushing teeth: None  Eating Meals: None  Daily Activity - Total Score: 24    Education Documentation  No documentation found.  Education Comments  No comments found.      Problem: OT Goals  Goal: Patient will be independent with all ADL of bathing, dressing, toileting with good safety and G balance.   Outcome: Met  Goal: Patient will be able to complete all functional transfers/mobility independently using good safety with G balance.   Outcome: Met  Goal: Patient will be able to tolerate 20 min of functional standing with G balance in prep for ADL/transfers.  Outcome: Met

## 2024-05-28 NOTE — PROGRESS NOTES
05/28/24 1255   Current Planned Discharge Disposition   Current Planned Discharge Disposition Home

## 2024-05-28 NOTE — CARE PLAN
The patient's goals for the shift include Feel better    The clinical goals for the shift include safety

## 2024-05-29 LAB
AORTIC VALVE MEAN GRADIENT: 1.4 MMHG
AORTIC VALVE PEAK VELOCITY: 0.76 M/S
AV PEAK GRADIENT: 2.3 MMHG
AVA (PEAK VEL): 2.05 CM2
AVA (VTI): 1.99 CM2
EJECTION FRACTION APICAL 4 CHAMBER: 52.7
LEFT ATRIUM VOLUME AREA LENGTH INDEX BSA: 57.4 ML/M2
LEFT VENTRICLE INTERNAL DIMENSION DIASTOLE: 3.46 CM (ref 3.5–6)
LEFT VENTRICULAR OUTFLOW TRACT DIAMETER: 1.98 CM
LV EJECTION FRACTION BIPLANE: 56 %
MITRAL VALVE E/A RATIO: 3.65
MITRAL VALVE E/E' RATIO: 13.55
RIGHT VENTRICLE FREE WALL PEAK S': 12.9 CM/S
RIGHT VENTRICLE PEAK SYSTOLIC PRESSURE: 20.8 MMHG
TRICUSPID ANNULAR PLANE SYSTOLIC EXCURSION: 1.7 CM

## 2024-05-29 PROCEDURE — 2500000005 HC RX 250 GENERAL PHARMACY W/O HCPCS: Performed by: INTERNAL MEDICINE

## 2024-05-29 PROCEDURE — 2500000002 HC RX 250 W HCPCS SELF ADMINISTERED DRUGS (ALT 637 FOR MEDICARE OP, ALT 636 FOR OP/ED): Performed by: INTERNAL MEDICINE

## 2024-05-29 PROCEDURE — 2500000001 HC RX 250 WO HCPCS SELF ADMINISTERED DRUGS (ALT 637 FOR MEDICARE OP): Performed by: NURSE PRACTITIONER

## 2024-05-29 PROCEDURE — 2500000001 HC RX 250 WO HCPCS SELF ADMINISTERED DRUGS (ALT 637 FOR MEDICARE OP): Performed by: INTERNAL MEDICINE

## 2024-05-29 PROCEDURE — 2500000004 HC RX 250 GENERAL PHARMACY W/ HCPCS (ALT 636 FOR OP/ED): Performed by: NURSE PRACTITIONER

## 2024-05-29 PROCEDURE — 2500000004 HC RX 250 GENERAL PHARMACY W/ HCPCS (ALT 636 FOR OP/ED): Performed by: INTERNAL MEDICINE

## 2024-05-29 PROCEDURE — 99232 SBSQ HOSP IP/OBS MODERATE 35: CPT | Performed by: INTERNAL MEDICINE

## 2024-05-29 PROCEDURE — 1200000002 HC GENERAL ROOM WITH TELEMETRY DAILY

## 2024-05-29 RX ORDER — DILTIAZEM HYDROCHLORIDE 120 MG/1
120 CAPSULE, COATED, EXTENDED RELEASE ORAL 2 TIMES DAILY
Status: DISCONTINUED | OUTPATIENT
Start: 2024-05-29 | End: 2024-05-29

## 2024-05-29 RX ORDER — MIDODRINE HYDROCHLORIDE 5 MG/1
5 TABLET ORAL
Status: DISCONTINUED | OUTPATIENT
Start: 2024-05-29 | End: 2024-05-30

## 2024-05-29 RX ORDER — METOPROLOL SUCCINATE 100 MG/1
100 TABLET, EXTENDED RELEASE ORAL DAILY
OUTPATIENT
Start: 2024-05-29

## 2024-05-29 RX ORDER — METOPROLOL SUCCINATE 50 MG/1
50 TABLET, EXTENDED RELEASE ORAL 2 TIMES DAILY
Status: DISCONTINUED | OUTPATIENT
Start: 2024-05-29 | End: 2024-05-30

## 2024-05-29 RX ADMIN — MIDODRINE HYDROCHLORIDE 5 MG: 5 TABLET ORAL at 16:36

## 2024-05-29 RX ADMIN — CEFDINIR 300 MG: 300 CAPSULE ORAL at 20:43

## 2024-05-29 RX ADMIN — Medication 400 MG: at 10:16

## 2024-05-29 RX ADMIN — METOPROLOL SUCCINATE 50 MG: 50 TABLET, EXTENDED RELEASE ORAL at 20:43

## 2024-05-29 RX ADMIN — LIDOCAINE 1 PATCH: 4 PATCH TOPICAL at 10:15

## 2024-05-29 RX ADMIN — ASCORBIC ACID, THIAMINE MONONITRATE,RIBOFLAVIN, NIACINAMIDE, PYRIDOXINE HYDROCHLORIDE, FOLIC ACID, CYANOCOBALAMIN, BIOTIN, CALCIUM PANTOTHENATE, 1 CAPSULE: 100; 1.5; 1.7; 20; 10; 1; 6000; 150000; 5 CAPSULE, LIQUID FILLED ORAL at 10:16

## 2024-05-29 RX ADMIN — CEFDINIR 300 MG: 300 CAPSULE ORAL at 10:18

## 2024-05-29 RX ADMIN — PANTOPRAZOLE SODIUM 40 MG: 40 TABLET, DELAYED RELEASE ORAL at 10:16

## 2024-05-29 RX ADMIN — SODIUM CHLORIDE 500 ML: 900 INJECTION, SOLUTION INTRAVENOUS at 14:45

## 2024-05-29 RX ADMIN — METOPROLOL SUCCINATE 100 MG: 100 TABLET, EXTENDED RELEASE ORAL at 10:16

## 2024-05-29 RX ADMIN — POTASSIUM CHLORIDE 10 MEQ: 750 TABLET, EXTENDED RELEASE ORAL at 10:16

## 2024-05-29 RX ADMIN — APIXABAN 5 MG: 5 TABLET, FILM COATED ORAL at 20:43

## 2024-05-29 RX ADMIN — ESCITALOPRAM OXALATE 10 MG: 10 TABLET ORAL at 10:16

## 2024-05-29 RX ADMIN — APIXABAN 5 MG: 5 TABLET, FILM COATED ORAL at 10:16

## 2024-05-29 ASSESSMENT — COGNITIVE AND FUNCTIONAL STATUS - GENERAL
DAILY ACTIVITIY SCORE: 24
CLIMB 3 TO 5 STEPS WITH RAILING: A LITTLE
MOBILITY SCORE: 23
DAILY ACTIVITIY SCORE: 24
CLIMB 3 TO 5 STEPS WITH RAILING: A LITTLE
MOBILITY SCORE: 23

## 2024-05-29 ASSESSMENT — PAIN SCALES - GENERAL
PAINLEVEL_OUTOF10: 1
PAINLEVEL_OUTOF10: 0 - NO PAIN
PAINLEVEL_OUTOF10: 0 - NO PAIN

## 2024-05-29 NOTE — PROGRESS NOTES
Subjective Data:  Patient as above no chest pain or tightness.  Patient currently walking around in the hallway.  Negative orthostatic.  Stable cardiac wise.  Episode of tachycardia palpitation.  Patient currently on Eliquis as well as Toprol-XL hide milligram tablet p.o. daily.  Overnight Events:    None  Objective   Last Recorded Vitals  BP 94/68 (BP Location: Right arm, Patient Position: Standing)   Pulse 89   Temp 36.7 °C (98.1 °F) (Oral)   Resp 18   Wt 78.5 kg (173 lb 1 oz)   SpO2 97%   No intake or output data in the 24 hours ending 05/29/24 2250  Physical Exam:  HEENT: Normocephalic/atraumatic pupils equal react light  Neck exam mild JVD, no bruit  Lung exam clear to auscultation, few crackles at the bases  Cardiac exam is regular rhythm S1-S2, soft systolic murmur heard.   Abdomen soft nontender, nondistended  Extremities no clubbing, cyanosis, trace edema  Neuro exam grossly intact.  Image Results  Transthoracic Echo (TTE) [place] [address]             Phone [phone]    TRANSTHORACIC ECHOCARDIOGRAM REPORT       Patient Name:     DL QUINONES LEEANN     Reading Physician:   [id number] Tor Medley MD  Study Date:       5/28/2024            Ordering Provider:   [id number] JOSE LUIS DALEY  MRN/PID:          [id number]             Fellow:  Accession#:       [id number]         Nurse:  Date of           1940 / 84 years Sonographer:         Carmen Pantoja RDCS  Birth/Age:  Gender:           F                    Additional Staff:  Height:           162.56 cm            Admit Date:  Weight:           78.47 kg             Admission Status:    Inpatient - Routine  BSA / BMI:        1.84 m2 / 29.70      [facility]                    kg/m2  Blood Pressure: 149 /88 mmHg    Study Type:    TRANSTHORACIC ECHO (TTE) COMPLETE  Diagnosis/ICD: Paroxysmal atrial  fibrillation-I48.0  Indication:    dizziness, AFIB  CPT Codes:     Echo Complete w Full Doppler-[id number]    Patient History:  Pertinent History: AFib, HTN, HLD, cardiomyopathy, GERD.    Study Detail: The following Echo studies were performed: 2D, M-Mode, Doppler and                color flow.       PHYSICIAN INTERPRETATION:  Left Ventricle: Left ventricular systolic function is normal, with an estimated ejection fraction of 60-65%. There are no regional wall motion abnormalities. The left ventricular cavity size is normal. There is mild to moderate concentric left ventricular hypertrophy involving the basal wall and septal wall. Spectral Doppler shows an impaired relaxation pattern of left ventricular diastolic filling.  Left Atrium: The left atrium is moderately dilated.  Right Ventricle: The right ventricle is normal in size. There is normal right ventricular global systolic function.  Right Atrium: The right atrium is mildly dilated.  Aortic Valve: The aortic valve is probably trileaflet. There is trivial aortic valve regurgitation. The peak instantaneous gradient of the aortic valve is 2.3 mmHg. The mean gradient of the aortic valve is 1.4 mmHg.  Mitral Valve: The mitral valve is normal in structure. There is trace mitral valve regurgitation.  Tricuspid Valve: The tricuspid valve is structurally normal. There is mild tricuspid regurgitation.  Pulmonic Valve: The pulmonic valve is not well visualized. There is trace pulmonic valve regurgitation.  Pericardium: There is no pericardial effusion noted.  Aorta: The aortic root was not well visualized.       CONCLUSIONS:   1. Left ventricular systolic function is normal with a 60-65% estimated ejection fraction.   2. Spectral Doppler shows an impaired relaxation pattern of left ventricular diastolic filling.   3. The left atrium is moderately dilated.    QUANTITATIVE DATA SUMMARY:  2D MEASUREMENTS:                           Normal Ranges:  IVSd:          1.18 cm    (0.6-1.1cm)  LVPWd:         0.67 cm   (0.6-1.1cm)  LVIDd:         3.46 cm   (3.9-5.9cm)  LVIDs:         2.32 cm  LV Mass Index: 49.5 g/m2  LV % FS        33.1 %    LA VOLUME:                                Normal Ranges:  LA Vol A4C:        87.0 ml    (22+/-6mL/m2)  LA Vol A2C:        116.9 ml  LA Vol BP:         105.6 ml  LA Vol Index A4C:  47.3 ml/m2  LA Vol Index A2C:  63.5 ml/m2  LA Vol Index BP:   57.4 ml/m2  LA Area A4C:       25.4 cm2  LA Area A2C:       30.8 cm2  LA Major Axis A4C: 6.3 cm  LA Major Axis A2C: 6.9 cm  LA Volume Index:   57.5 ml/m2  LA Vol A4C:        87.0 ml  LA Vol A2C:        116.0 ml    RA VOLUME BY A/L METHOD:                                Normal Ranges:  RA Vol A4C:        50.1 ml    (8.3-19.5ml)  RA Vol Index A4C:  27.2 ml/m2  RA Area A4C:       17.5 cm2  RA Major Axis A4C: 5.2 cm    AORTA MEASUREMENTS:                       Normal Ranges:  Ao Sinus, d: 3.10 cm (2.1-3.5cm)  Ao STJ, d:   2.60 cm (1.7-3.4cm)  Asc Ao, d:   3.20 cm (2.1-3.4cm)    LV SYSTOLIC FUNCTION BY 2D PLANIMETRY (MOD):                      Normal Ranges:  EF-A4C View: 52.7 % (>=55%)  EF-A2C View: 59.9 %  EF-Biplane:  55.6 %    LV DIASTOLIC FUNCTION:                         Normal Ranges:  MV Peak E:    0.88 m/s (0.7-1.2 m/s)  MV Peak A:    0.24 m/s (0.42-0.7 m/s)  E/A Ratio:    3.65     (1.0-2.2)  MV e'         0.06 m/s (>8.0)  MV lateral e' 0.09 m/s  MV medial e'  0.04 m/s  E/e' Ratio:   13.55    (<8.0)    MITRAL VALVE:                  Normal Ranges:  MV DT: 140 msec (150-240msec)    AORTIC VALVE:                                    Normal Ranges:  AoV Vmax:                0.76 m/s (<=1.7m/s)  AoV Peak P.3 mmHg (<20mmHg)  AoV Mean P.4 mmHg (1.7-11.5mmHg)  LVOT Max Fabián:            0.51 m/s (<=1.1m/s)  AoV VTI:                 12.61 cm (18-25cm)  LVOT VTI:                8.15 cm  LVOT Diameter:           1.98 cm  (1.8-2.4cm)  AoV Area, VTI:           1.99 cm2 (2.5-5.5cm2)  AoV Area,Vmax:            2.05 cm2 (2.5-4.5cm2)  AoV Dimensionless Index: 0.65       RIGHT VENTRICLE:  RV Basal 3.47 cm  RV Mid   2.10 cm  RV Major 5.2 cm  TAPSE:   17.2 mm  RV s'    0.13 m/s    TRICUSPID VALVE/RVSP:                              Normal Ranges:  Peak TR Velocity: 2.11 m/s  RV Syst Pressure: 20.8 mmHg (< 30mmHg)  IVC Diam:         1.36 cm    PULMONIC VALVE:                          Normal Ranges:  PV Accel Time: 101 msec (>120ms)  PV Max Fabián:    0.7 m/s  (0.6-0.9m/s)  PV Max P.0 mmHg    AORTA:  Asc Ao Diam 3.22 cm       [id number] Tor Medley MD  Electronically signed on 2024 at 9:17:23 AM       ** Final **    Last Labs:  CBC - 2024:  6:13 AM  8.2 14.8 262    44.4      CMP - 2024:  6:14 AM  9.8 7.8 19 --- 0.5   _ 4.0 9 85      PTT - No results in last year.  _   _ _     Inpatient Medications:  Scheduled medications   Medication Dose Route Frequency    apixaban  5 mg oral BID    B complex-vitamin C-folic acid  1 capsule oral Daily    cefdinir  300 mg oral BID    escitalopram  10 mg oral Daily    lidocaine  1 patch transdermal Daily    magnesium oxide  400 mg oral Daily    metoprolol succinate XL  100 mg oral Daily    pantoprazole  40 mg oral Daily    potassium chloride CR  10 mEq oral Daily    sodium chloride  500 mL intravenous Once     Active Problems:    Anxiety    GERD without esophagitis    Hyperlipidemia    UTI (urinary tract infection)    PAF (paroxysmal atrial fibrillation) (Multi)    Essential hypertension    Acute cystitis    Pyelonephritis    Assessment/Plan   Patient has above only history of paroxysmal atrial fibrillation now with a UTI, tachycardia palpitation not able to control rate well with the beta-blocker we will switch to Cardizem only.  1.  SVT: Change metoprolol to 50 mg tablet twice daily for better rate control.  As well as add midodrine 5 mg tablet p.o. 3 times a day.  Continue current Eliquis.  Continue monitoring.  IV fluid if patient become a positive orthostatic.   Echocardiogram showed ejection fraction in the normal range.  Pt. care time is spent includes review of diagnostic tests, labs, radiographs, EKGs, old echoes, cardiac work-up and coordination of care. Assessment, impression and plans are reflected in the note above as well as the orders.    Code Status:  Full Code  I spent 35 minutes in the professional and overall care of this patient.  Tor Medley MD

## 2024-05-29 NOTE — DOCUMENTATION CLARIFICATION NOTE
"    PATIENT:               DL BRADSHAW  ACCT #:                  [id number]  MRN:                       [id number]  :                       1940  ADMIT DATE:       2024 6:44 PM  DISCH DATE:  RESPONDING PROVIDER #:        [id number]          PROVIDER RESPONSE TEXT:    Sepsis was a differential diagnosis and ruled out after study    CDI QUERY TEXT:    Clarification      Instruction:  Based on your assessment of the patient and the clinical information, please provide the requested documentation by clicking on the appropriate radio button and enter any additional information if prompted.    Question: Sepsis was documented in the medical record. Based on the documentation and the clinical information, can the diagnosis be further clarified as  [    When answering this query, please exercise your independent professional judgment. The fact that a question is being asked, does not imply that any particular answer is desired or expected.    The patient's clinical indicators include:  Clinical Information: 84 Y/F from urgent care facility with confusion and back pain    Documented Diagnosis:  Cardiology Consult and  Cardiology Progress Notes: \" Patient was found to have UTI with sepsis\"    Clinical Indicators:  -Vital Signs:  ED: 36.4 ?C  80 18  139/92  -WBC: 11.6  -Microbiology Results:  Urine Culture: 20,000 - 80,000 Escherichia coli- pansensitive  -Band Neutrophil Count/percent Band Neutrophil: 8.98  -Lactic acid: not performed  -BUN/Creat: 13/0.90  -Blood cultures: not performed  -Bilirubin: 0.5  -MAP: 107  -Alphonse Coma Scale: 14  -PAO2/FIO2: no ABGs performed, pulse ox:97 on RA  -Procalcitonin: not performed  -Platelets: 270  -Other clinical indicators:     IM Progress Note: \" Urinary tract infection- No evidence of overt sepsis\"      Treatment: IVF; Ceftriaxone 1g IVx 2 doses; Vibramycin 100mg IV q12H x5 doses then 100mg oral q12H; Omnicelf 300mg oral BID    Risk Factors: advanced age " and reported dementia  Options provided:  -- Sepsis was a differential diagnosis and ruled out after study  -- Sepsis with neurological organ dysfunction of Metabolic Encephalopathy  -- Sepsis with other organ dysfunction, Please specify sepsis associated organ dysfunction below  -- Other - I will add my own diagnosis  -- Refer to Clinical Documentation Reviewer    Query created by: Dora Vizcarra on 5/29/2024 3:45 PM      Electronically signed by:  DEEPA YATES-CNP 5/29/2024 4:24 PM

## 2024-05-29 NOTE — CARE PLAN
The patient's goals for the shift include Feel better    The clinical goals for the shift include control heartrate

## 2024-05-29 NOTE — CARE PLAN
The patient's goals for the shift include Feel better    The clinical goals for the shift include safety and comfort

## 2024-05-29 NOTE — PROGRESS NOTES
Dl Castellanos is a 84 y.o. female on day 2 of admission presenting with Encephalopathy acute.      Subjective   Patient seen and examined. Awake/alert/oriented. Denies chest pain, shortness of breath, fevers, chills, nausea, or vomiting. Denies lightheadedness or dizziness.         Objective     Last Recorded Vitals  /82 (BP Location: Right arm, Patient Position: Lying)   Pulse 86   Temp 37 °C (98.6 °F) (Oral)   Resp 18   Wt 78.5 kg (173 lb 1 oz)   SpO2 97%   Intake/Output last 3 Shifts:  No intake or output data in the 24 hours ending 05/29/24 1122      Admission Weight  Weight: 79.4 kg (175 lb) (05/23/24 1839)    Daily Weight  05/24/24 : 78.5 kg (173 lb 1 oz)    Image Results  Transthoracic Echo (TTE) [place] [address]             Phone [phone]    TRANSTHORACIC ECHOCARDIOGRAM REPORT       Patient Name:     DL CASTELLANOS     Reading Physician:   [id number] Tor Medley MD  Study Date:       5/28/2024            Ordering Provider:   [id number] JOSE LUIS DALEY  MRN/PID:          [id number]             Fellow:  Accession#:       [id number]         Nurse:  Date of           1940 / 84 years Sonographer:         Carmen Pantoja RDCS  Birth/Age:  Gender:           F                    Additional Staff:  Height:           162.56 cm            Admit Date:  Weight:           78.47 kg             Admission Status:    Inpatient - Routine  BSA / BMI:        1.84 m2 / 29.70      Department Location: [facility]                    kg/m2  Blood Pressure: 149 /88 mmHg    Study Type:    TRANSTHORACIC ECHO (TTE) COMPLETE  Diagnosis/ICD: Paroxysmal atrial fibrillation-I48.0  Indication:    dizziness, AFIB  CPT Codes:     Echo Complete w Full [id number]    Patient History:  Pertinent History: AFib, HTN, HLD, cardiomyopathy, GERD.    Study Detail: The following Echo studies were  performed: 2D, M-Mode, Doppler and                color flow.       PHYSICIAN INTERPRETATION:  Left Ventricle: Left ventricular systolic function is normal, with an estimated ejection fraction of 60-65%. There are no regional wall motion abnormalities. The left ventricular cavity size is normal. There is mild to moderate concentric left ventricular hypertrophy involving the basal wall and septal wall. Spectral Doppler shows an impaired relaxation pattern of left ventricular diastolic filling.  Left Atrium: The left atrium is moderately dilated.  Right Ventricle: The right ventricle is normal in size. There is normal right ventricular global systolic function.  Right Atrium: The right atrium is mildly dilated.  Aortic Valve: The aortic valve is probably trileaflet. There is trivial aortic valve regurgitation. The peak instantaneous gradient of the aortic valve is 2.3 mmHg. The mean gradient of the aortic valve is 1.4 mmHg.  Mitral Valve: The mitral valve is normal in structure. There is trace mitral valve regurgitation.  Tricuspid Valve: The tricuspid valve is structurally normal. There is mild tricuspid regurgitation.  Pulmonic Valve: The pulmonic valve is not well visualized. There is trace pulmonic valve regurgitation.  Pericardium: There is no pericardial effusion noted.  Aorta: The aortic root was not well visualized.       CONCLUSIONS:   1. Left ventricular systolic function is normal with a 60-65% estimated ejection fraction.   2. Spectral Doppler shows an impaired relaxation pattern of left ventricular diastolic filling.   3. The left atrium is moderately dilated.    QUANTITATIVE DATA SUMMARY:  2D MEASUREMENTS:                           Normal Ranges:  IVSd:          1.18 cm   (0.6-1.1cm)  LVPWd:         0.67 cm   (0.6-1.1cm)  LVIDd:         3.46 cm   (3.9-5.9cm)  LVIDs:         2.32 cm  LV Mass Index: 49.5 g/m2  LV % FS        33.1 %    LA VOLUME:                                Normal Ranges:  LA Vol A4C:         87.0 ml    (22+/-6mL/m2)  LA Vol A2C:        116.9 ml  LA Vol BP:         105.6 ml  LA Vol Index A4C:  47.3 ml/m2  LA Vol Index A2C:  63.5 ml/m2  LA Vol Index BP:   57.4 ml/m2  LA Area A4C:       25.4 cm2  LA Area A2C:       30.8 cm2  LA Major Axis A4C: 6.3 cm  LA Major Axis A2C: 6.9 cm  LA Volume Index:   57.5 ml/m2  LA Vol A4C:        87.0 ml  LA Vol A2C:        116.0 ml    RA VOLUME BY A/L METHOD:                                Normal Ranges:  RA Vol A4C:        50.1 ml    (8.3-19.5ml)  RA Vol Index A4C:  27.2 ml/m2  RA Area A4C:       17.5 cm2  RA Major Axis A4C: 5.2 cm    AORTA MEASUREMENTS:                       Normal Ranges:  Ao Sinus, d: 3.10 cm (2.1-3.5cm)  Ao STJ, d:   2.60 cm (1.7-3.4cm)  Asc Ao, d:   3.20 cm (2.1-3.4cm)    LV SYSTOLIC FUNCTION BY 2D PLANIMETRY (MOD):                      Normal Ranges:  EF-A4C View: 52.7 % (>=55%)  EF-A2C View: 59.9 %  EF-Biplane:  55.6 %    LV DIASTOLIC FUNCTION:                         Normal Ranges:  MV Peak E:    0.88 m/s (0.7-1.2 m/s)  MV Peak A:    0.24 m/s (0.42-0.7 m/s)  E/A Ratio:    3.65     (1.0-2.2)  MV e'         0.06 m/s (>8.0)  MV lateral e' 0.09 m/s  MV medial e'  0.04 m/s  E/e' Ratio:   13.55    (<8.0)    MITRAL VALVE:                  Normal Ranges:  MV DT: 140 msec (150-240msec)    AORTIC VALVE:                                    Normal Ranges:  AoV Vmax:                0.76 m/s (<=1.7m/s)  AoV Peak P.3 mmHg (<20mmHg)  AoV Mean P.4 mmHg (1.7-11.5mmHg)  LVOT Max Fabián:            0.51 m/s (<=1.1m/s)  AoV VTI:                 12.61 cm (18-25cm)  LVOT VTI:                8.15 cm  LVOT Diameter:           1.98 cm  (1.8-2.4cm)  AoV Area, VTI:           1.99 cm2 (2.5-5.5cm2)  AoV Area,Vmax:           2.05 cm2 (2.5-4.5cm2)  AoV Dimensionless Index: 0.65       RIGHT VENTRICLE:  RV Basal 3.47 cm  RV Mid   2.10 cm  RV Major 5.2 cm  TAPSE:   17.2 mm  RV s'    0.13 m/s    TRICUSPID VALVE/RVSP:                              Normal  Ranges:  Peak TR Velocity: 2.11 m/s  RV Syst Pressure: 20.8 mmHg (< 30mmHg)  IVC Diam:         1.36 cm    PULMONIC VALVE:                          Normal Ranges:  PV Accel Time: 101 msec (>120ms)  PV Max Fabián:    0.7 m/s  (0.6-0.9m/s)  PV Max P.0 mmHg    AORTA:  Asc Ao Diam 3.22 cm       [id number] Tor Medley MD  Electronically signed on 2024 at 9:17:23 AM       ** Final **      Physical Exam  Vitals reviewed.   Constitutional:       Appearance: Normal appearance.   HENT:      Head: Normocephalic and atraumatic.   Eyes:      Extraocular Movements: Extraocular movements intact.      Conjunctiva/sclera: Conjunctivae normal.   Cardiovascular:      Rate and Rhythm: Normal rate and regular rhythm.   Pulmonary:      Effort: Pulmonary effort is normal.      Breath sounds: Normal breath sounds. No wheezing, rhonchi or rales.   Abdominal:      General: Bowel sounds are normal.      Palpations: Abdomen is soft.      Tenderness: There is no abdominal tenderness.   Skin:     General: Skin is warm and dry.   Neurological:      General: No focal deficit present.      Mental Status: She is alert and oriented to person, place, and time.         Relevant Results  Lab Results   Component Value Date    GLUCOSE 123 (H) 2024    CALCIUM 9.8 2024     2024    K 3.9 2024    CO2 24 2024     2024    BUN 14 2024    CREATININE 0.80 2024     Lab Results   Component Value Date    WBC 8.2 2024    HGB 14.8 2024    HCT 44.4 2024    MCV 89 2024     2024     ECG 12 lead  Result Date: 2024  Atrial fibrillation Nonspecific T wave abnormality Abnormal ECG When compared with ECG of 19-OCT-2017 19:48, Atrial fibrillation has replaced Sinus rhythm Vent. rate has increased BY  41 BPM Nonspecific T wave abnormality, worse in Lateral leads    CT abdomen pelvis wo IV contrast  Result Date: 2024  Under distention of the urinary bladder limits  evaluation however correlate for cystitis suggested. No evidence of obstructive nephropathy identified.   Diverticulosis, moderate hiatal hernia, trees pericardial effusion and additional findings as detailed.   MACRO: None   Signed by: Nuha Pulido 5/23/2024 9:02 PM Dictation workstation:   [id number]    CT head wo IV contrast  Result Date: 5/23/2024  No acute intracranial hemorrhage or mass effect.   Nonspecific white matter changes and parenchymal loss.   MACRO: None.   Signed by: Nuha Pulido 5/23/2024 8:54 PM Dictation workstation:   [id number]    XR chest 1 view  Result Date: 5/23/2024  Cardiomegaly with small hiatal hernia. No other acute intrathoracic abnormality.   Signed by: Emiliano Bonilla 5/23/2024 8:02 PM Dictation workstation:   [id number]          Assessment/Plan      Active Problems:    Anxiety    GERD without esophagitis    Hyperlipidemia    UTI (urinary tract infection)    PAF (paroxysmal atrial fibrillation) (Multi)    Essential hypertension    Acute cystitis    Pyelonephritis         Urinary tract infection  Continue oral Cefdinir   Culture reveals e-coli with pan susceptibility      Metabolic encephalopathy-resolved  Secondary to UTI    Orthostatic Hypotension  Stop diltiazem   IV fluids  Repeat orthostatic vitals today  Monitor on tele    Abnormal CT chest  Stable on room air  Currently afebrile, no respiratory complaints  Observe off oral doxycycline    Trace pericardial effusion  Seen on CT  Echocardiogram with normal EF  Cardiology is on consult    Weakness  Fall precautions  PT/OT     DVT prophylaxis   Eliquis     Plan  Continue oral ATB  Check orthostatic vitals  Monitor on tele  PT/OT  Discharge planning home, possibly today if orthos negative and cleared by cardiology.              Claudia Ho, APRN-CNP

## 2024-05-30 ENCOUNTER — PHARMACY VISIT (OUTPATIENT)
Dept: PHARMACY | Facility: CLINIC | Age: 84
End: 2024-05-30
Payer: COMMERCIAL

## 2024-05-30 VITALS
WEIGHT: 173.06 LBS | RESPIRATION RATE: 19 BRPM | OXYGEN SATURATION: 97 % | BODY MASS INDEX: 29.55 KG/M2 | DIASTOLIC BLOOD PRESSURE: 93 MMHG | HEART RATE: 89 BPM | TEMPERATURE: 98.4 F | SYSTOLIC BLOOD PRESSURE: 145 MMHG | HEIGHT: 64 IN

## 2024-05-30 PROCEDURE — 2500000002 HC RX 250 W HCPCS SELF ADMINISTERED DRUGS (ALT 637 FOR MEDICARE OP, ALT 636 FOR OP/ED): Performed by: INTERNAL MEDICINE

## 2024-05-30 PROCEDURE — 2500000001 HC RX 250 WO HCPCS SELF ADMINISTERED DRUGS (ALT 637 FOR MEDICARE OP): Performed by: INTERNAL MEDICINE

## 2024-05-30 PROCEDURE — 2500000001 HC RX 250 WO HCPCS SELF ADMINISTERED DRUGS (ALT 637 FOR MEDICARE OP): Performed by: NURSE PRACTITIONER

## 2024-05-30 PROCEDURE — 2500000004 HC RX 250 GENERAL PHARMACY W/ HCPCS (ALT 636 FOR OP/ED): Performed by: INTERNAL MEDICINE

## 2024-05-30 PROCEDURE — 2500000005 HC RX 250 GENERAL PHARMACY W/O HCPCS: Performed by: INTERNAL MEDICINE

## 2024-05-30 PROCEDURE — RXMED WILLOW AMBULATORY MEDICATION CHARGE

## 2024-05-30 RX ORDER — MIDODRINE HYDROCHLORIDE 2.5 MG/1
2.5 TABLET ORAL
Status: DISCONTINUED | OUTPATIENT
Start: 2024-05-30 | End: 2024-05-30 | Stop reason: HOSPADM

## 2024-05-30 RX ORDER — DIGOXIN 125 MCG
125 TABLET ORAL DAILY
Status: DISCONTINUED | OUTPATIENT
Start: 2024-05-30 | End: 2024-05-30 | Stop reason: HOSPADM

## 2024-05-30 RX ORDER — DIGOXIN 125 MCG
125 TABLET ORAL DAILY
Qty: 30 TABLET | Refills: 0 | Status: SHIPPED | OUTPATIENT
Start: 2024-05-31

## 2024-05-30 RX ORDER — MIDODRINE HYDROCHLORIDE 5 MG/1
2.5 TABLET ORAL
Qty: 45 TABLET | Refills: 1 | Status: SHIPPED | OUTPATIENT
Start: 2024-05-30

## 2024-05-30 RX ORDER — ATENOLOL 50 MG/1
50 TABLET ORAL 2 TIMES DAILY
Qty: 60 TABLET | Refills: 1 | Status: SHIPPED | OUTPATIENT
Start: 2024-05-30

## 2024-05-30 RX ORDER — ATENOLOL 50 MG/1
50 TABLET ORAL 2 TIMES DAILY
Status: DISCONTINUED | OUTPATIENT
Start: 2024-05-30 | End: 2024-05-30 | Stop reason: HOSPADM

## 2024-05-30 RX ADMIN — APIXABAN 5 MG: 5 TABLET, FILM COATED ORAL at 08:22

## 2024-05-30 RX ADMIN — MIDODRINE HYDROCHLORIDE 2.5 MG: 2.5 TABLET ORAL at 16:41

## 2024-05-30 RX ADMIN — ASCORBIC ACID, THIAMINE MONONITRATE,RIBOFLAVIN, NIACINAMIDE, PYRIDOXINE HYDROCHLORIDE, FOLIC ACID, CYANOCOBALAMIN, BIOTIN, CALCIUM PANTOTHENATE, 1 CAPSULE: 100; 1.5; 1.7; 20; 10; 1; 6000; 150000; 5 CAPSULE, LIQUID FILLED ORAL at 08:22

## 2024-05-30 RX ADMIN — CEFDINIR 300 MG: 300 CAPSULE ORAL at 08:22

## 2024-05-30 RX ADMIN — ESCITALOPRAM OXALATE 10 MG: 10 TABLET ORAL at 08:22

## 2024-05-30 RX ADMIN — LIDOCAINE 1 PATCH: 4 PATCH TOPICAL at 08:22

## 2024-05-30 RX ADMIN — MIDODRINE HYDROCHLORIDE 2.5 MG: 2.5 TABLET ORAL at 11:18

## 2024-05-30 RX ADMIN — POTASSIUM CHLORIDE 10 MEQ: 750 TABLET, EXTENDED RELEASE ORAL at 08:22

## 2024-05-30 RX ADMIN — Medication 400 MG: at 08:22

## 2024-05-30 RX ADMIN — DIGOXIN 125 MCG: 125 TABLET ORAL at 11:17

## 2024-05-30 RX ADMIN — METOPROLOL SUCCINATE 50 MG: 50 TABLET, EXTENDED RELEASE ORAL at 08:22

## 2024-05-30 RX ADMIN — PANTOPRAZOLE SODIUM 40 MG: 40 TABLET, DELAYED RELEASE ORAL at 08:22

## 2024-05-30 ASSESSMENT — COGNITIVE AND FUNCTIONAL STATUS - GENERAL
MOBILITY SCORE: 23
CLIMB 3 TO 5 STEPS WITH RAILING: A LITTLE
DAILY ACTIVITIY SCORE: 24

## 2024-05-30 ASSESSMENT — PAIN DESCRIPTION - LOCATION: LOCATION: BACK

## 2024-05-30 ASSESSMENT — PAIN SCALES - GENERAL: PAINLEVEL_OUTOF10: 1

## 2024-05-30 NOTE — DISCHARGE SUMMARY
Discharge Diagnosis  Encephalopathy acute    Issues Requiring Follow-Up  Fall precaution    Discharge Meds     Your medication list        START taking these medications        Instructions Last Dose Given Next Dose Due   atenolol 50 mg tablet  Commonly known as: Tenormin      Take 1 tablet (50 mg) by mouth 2 times a day.       cefdinir 300 mg capsule  Commonly known as: Omnicef      Take 1 capsule (300 mg) by mouth 2 times a day for 5 days.       digoxin 125 MCG tablet  Commonly known as: Lanoxin  Start taking on: May 31, 2024      Take 1 tablet (125 mcg) by mouth once daily.       midodrine 2.5 mg tablet  Commonly known as: Proamatine      Take 1 tablet (2.5 mg) by mouth 3 times daily (morning, midday, late afternoon).              CONTINUE taking these medications        Instructions Last Dose Given Next Dose Due   apixaban 5 mg tablet  Commonly known as: Eliquis      Take 1 tablet (5 mg) by mouth 2 times a day.       B complex-vitamin C-folic acid 0.8 mg tablet  Commonly known as: Nephro-Radha           escitalopram 10 mg tablet  Commonly known as: Lexapro      Take 1 tablet (10 mg) by mouth once daily.       fluticasone 50 mcg/actuation nasal spray  Commonly known as: Flonase           magnesium oxide 400 mg tablet  Commonly known as: Mag-Ox           multivitamin tablet           omeprazole 40 mg DR capsule  Commonly known as: PriLOSEC      Take 1 capsule (40 mg) by mouth once daily in the morning. Take before meals.       potassium chloride CR 10 mEq ER tablet  Commonly known as: Klor-Con      TAKE 1 TABLET BY MOUTH ONCE DAILY WITH FOOD              STOP taking these medications      dilTIAZem 120 mg immediate release tablet  Commonly known as: Cardizem        metoprolol succinate XL 25 mg 24 hr tablet  Commonly known as: Toprol-XL                  Where to Get Your Medications        These medications [facility]  [id number] [address] [facility] [address]      Hours: 9 AM to 6 PM  Patient would like to talk to a nurse. I cannot understand as her voice is extremely hoarse.  Patient's phone: [phone] Mon-Fri, 9 AM to 1 PM Sat Phone: [phone]   atenolol 50 mg tablet  cefdinir 300 mg capsule  digoxin 125 MCG tablet  midodrine 2.5 mg tablet         Test Results Pending At Discharge  Pending Labs       No current pending labs.            Hospital Course   There was concern for a kidney infection at the urgent care facility.  Patient also did have associated dysuria and left flank pain.  Urgent care is concerned patient may have pyelonephritis or kidney stone.  She denied any fevers.  Patient was somewhat of a poor historian and history was limited as per mild dementia per ED notes.  Family admitted that the patient has been becoming more confused lately.  Has had some left flank pain and vomiting.   CT positive for cystitis. No obstruction.  Patient was seen and evaluated by cardiology for an episode of A-fib with RVR.  Patient developed orthostatic blood pressure.  Medications were adjusted, final recommendation from cardiology was midodrine, atenolol, and digoxin with close outpatient follow-up.  Patient this morning is negative for orthostatic blood pressure and feeling well.  She is ambulating to the bathroom independently denies any dizziness or lightheadedness.  Patient declined SNF and home care.  Pertinent Physical Exam At Time of Discharge  Physical Exam  Vitals reviewed.   HENT:      Head: Normocephalic and atraumatic.      Nose: Nose normal.      Mouth/Throat:      Mouth: Mucous membranes are moist.   Eyes:      Extraocular Movements: Extraocular movements intact.   Cardiovascular:      Rate and Rhythm: Rhythm irregular.   Pulmonary:      Effort: Pulmonary effort is normal.   Abdominal:      General: Bowel sounds are normal.   Musculoskeletal:         General: Normal range of motion.      Cervical back: Neck supple.   Skin:     General: Skin is warm.   Neurological:      Mental Status: She is alert and oriented to person, place, and time.         Outpatient Follow-Up  Future Appointments   Date Time  Provider Department Center   9/3/2024  1:45 PM Tiana Danielle MD [id number] [facility]         Bernice Zayas, APRN-CNP

## 2024-05-30 NOTE — PROGRESS NOTES
Spiritual Care Visit    Clinical Encounter Type  Visited With: Patient  Routine Visit: Follow-up  Continue Visiting: Yes         Values/Beliefs  Spiritual Requests During Hospitalization: [place] today. Patient having breakfast     Thomas Puentes

## 2024-05-30 NOTE — DISCHARGE INSTRUCTIONS
Fall precaution  Follow-up with cardiology and PCP  Prescriptions were sent to [facility]

## 2024-05-30 NOTE — CARE PLAN
Problem: Pain - Adult  Goal: Verbalizes/displays adequate comfort level or baseline comfort level  Outcome: Progressing     Problem: Safety - Adult  Goal: Free from fall injury  Outcome: Progressing     Problem: Discharge Planning  Goal: Discharge to home or other facility with appropriate resources  Outcome: Progressing     Problem: Chronic Conditions and Co-morbidities  Goal: Patient's chronic conditions and co-morbidity symptoms are monitored and maintained or improved  Outcome: Progressing     Problem: Fall/Injury  Goal: Not fall by end of shift  Outcome: Progressing  Goal: Be free from injury by end of the shift  Outcome: Progressing  Goal: Verbalize understanding of personal risk factors for fall in the hospital  Outcome: Progressing  Goal: Verbalize understanding of risk factor reduction measures to prevent injury from fall in the home  Outcome: Progressing  Goal: Use assistive devices by end of the shift  Outcome: Progressing  Goal: Pace activities to prevent fatigue by end of the shift  Outcome: Progressing     Problem: Pain  Goal: Takes deep breaths with improved pain control throughout the shift  Outcome: Progressing  Goal: Turns in bed with improved pain control throughout the shift  Outcome: Progressing  Goal: Walks with improved pain control throughout the shift  Outcome: Progressing  Goal: Performs ADL's with improved pain control throughout shift  Outcome: Progressing  Goal: Participates in PT with improved pain control throughout the shift  Outcome: Progressing  Goal: Free from opioid side effects throughout the shift  Outcome: Progressing  Goal: Free from acute confusion related to pain meds throughout the shift  Outcome: Progressing

## 2024-05-31 ENCOUNTER — PATIENT OUTREACH (OUTPATIENT)
Dept: PRIMARY CARE | Facility: CLINIC | Age: 84
End: 2024-05-31
Payer: MEDICARE

## 2024-05-31 DIAGNOSIS — Z09 HOSPITAL DISCHARGE FOLLOW-UP: ICD-10-CM

## 2024-05-31 NOTE — PROGRESS NOTES
TCM initial outreach complete- patient declines to follow up with her PCP post hospitalization. Patient states she is doing very well since returning home and has assistance in the home from her daughter. Patient confirmed she is aware of all medication changes made and has all medications needed in the home. Patient denied any questions, concerns or needs from TCM at this time. Patient was encouraged to call back if needed. TCM program closed.

## 2024-06-10 ENCOUNTER — OFFICE VISIT (OUTPATIENT)
Dept: CARDIOLOGY | Facility: CLINIC | Age: 84
End: 2024-06-10
Payer: MEDICARE

## 2024-06-10 VITALS — BODY MASS INDEX: 30.55 KG/M2 | WEIGHT: 178 LBS | SYSTOLIC BLOOD PRESSURE: 152 MMHG | DIASTOLIC BLOOD PRESSURE: 100 MMHG

## 2024-06-10 DIAGNOSIS — I95.1 ORTHOSTATIC HYPOTENSION: Primary | ICD-10-CM

## 2024-06-10 DIAGNOSIS — I48.11 LONGSTANDING PERSISTENT ATRIAL FIBRILLATION (MULTI): ICD-10-CM

## 2024-06-10 PROCEDURE — 1111F DSCHRG MED/CURRENT MED MERGE: CPT | Performed by: INTERNAL MEDICINE

## 2024-06-10 PROCEDURE — 3080F DIAST BP >= 90 MM HG: CPT | Performed by: INTERNAL MEDICINE

## 2024-06-10 PROCEDURE — 99214 OFFICE O/P EST MOD 30 MIN: CPT | Performed by: INTERNAL MEDICINE

## 2024-06-10 PROCEDURE — 1036F TOBACCO NON-USER: CPT | Performed by: INTERNAL MEDICINE

## 2024-06-10 PROCEDURE — 1125F AMNT PAIN NOTED PAIN PRSNT: CPT | Performed by: INTERNAL MEDICINE

## 2024-06-10 PROCEDURE — 3077F SYST BP >= 140 MM HG: CPT | Performed by: INTERNAL MEDICINE

## 2024-06-10 PROCEDURE — 1159F MED LIST DOCD IN RCRD: CPT | Performed by: INTERNAL MEDICINE

## 2024-06-10 RX ORDER — CYANOCOBALAMIN (VITAMIN B-12) 1000MCG/ML
DROPS ORAL
COMMUNITY

## 2024-06-10 ASSESSMENT — ENCOUNTER SYMPTOMS
OCCASIONAL FEELINGS OF UNSTEADINESS: 1
LOSS OF SENSATION IN FEET: 0
DEPRESSION: 0

## 2024-06-10 ASSESSMENT — PAIN SCALES - GENERAL: PAINLEVEL: 7

## 2024-06-10 NOTE — PROGRESS NOTES
Subjective      No chief complaint on file.       Here after recent hospitalization in the setting of cystitis and atrial fibrillation and seen by her previous cardiologist, Dr. Medley/Celeste      Previous: Patient was seen and evaluated by cardiology for an episode of A-fib with RVR.  Patient developed orthostatic blood pressure.  Medications were adjusted, final recommendation from cardiology was midodrine, atenolol, and digoxin with close outpatient follow-up.  Patient this morning is negative for orthostatic blood pressure and feeling well.  She is ambulating to the bathroom independently denies any dizziness or lightheadedness.  Patient declined SNF and home care.         Review of Systems   All other systems reviewed and are negative.       Objective   Physical Exam  Constitutional:       Appearance: Normal appearance.   HENT:      Head: Normocephalic and atraumatic.   Eyes:      Pupils: Pupils are equal, round, and reactive to light.   Cardiovascular:      Rate and Rhythm: Normal rate. Rhythm irregular.      Pulses: Normal pulses.      Heart sounds: Normal heart sounds.   Pulmonary:      Effort: Pulmonary effort is normal.      Breath sounds: Normal breath sounds.   Abdominal:      General: Abdomen is flat. Bowel sounds are normal.      Palpations: Abdomen is soft.   Musculoskeletal:         General: Normal range of motion.      Cervical back: Normal range of motion.   Skin:     General: Skin is warm and dry.   Neurological:      General: No focal deficit present.   Psychiatric:         Mood and Affect: Mood normal.         Judgment: Judgment normal.          Lab Review:   Not applicable    PAF (paroxysmal atrial fibrillation) (Multi)  Continue rate control of paroxysmal atrial fibrillation with atenolol and low-dose digoxin 0.125 mg daily.  Continue on Eliquis for cardioembolic risk reduction    Orthostatic hypotension  Continue midodrine 3 times daily to avoid orthostatic hypotension and allow us to   treat  her atrial fibrillation successfully with beta-blocker therapy

## 2024-06-10 NOTE — ASSESSMENT & PLAN NOTE
Continue midodrine 3 times daily to avoid orthostatic hypotension and allow us to   treat her atrial fibrillation successfully with beta-blocker therapy

## 2024-06-10 NOTE — ASSESSMENT & PLAN NOTE
Continue rate control of paroxysmal atrial fibrillation with atenolol and low-dose digoxin 0.125 mg daily.  Continue on Eliquis for cardioembolic risk reduction

## 2024-06-19 ENCOUNTER — HOSPITAL ENCOUNTER (EMERGENCY)
Facility: HOSPITAL | Age: 84
Discharge: HOME | End: 2024-06-20
Payer: MEDICARE

## 2024-06-19 ENCOUNTER — APPOINTMENT (OUTPATIENT)
Dept: RADIOLOGY | Facility: HOSPITAL | Age: 84
End: 2024-06-19
Payer: MEDICARE

## 2024-06-19 DIAGNOSIS — N30.01 ACUTE CYSTITIS WITH HEMATURIA: Primary | ICD-10-CM

## 2024-06-19 LAB
ALBUMIN SERPL-MCNC: 3.8 G/DL (ref 3.5–5)
ALP BLD-CCNC: 68 U/L (ref 35–125)
ALT SERPL-CCNC: 11 U/L (ref 5–40)
ANION GAP SERPL CALC-SCNC: 9 MMOL/L
APPEARANCE UR: ABNORMAL
AST SERPL-CCNC: 20 U/L (ref 5–40)
BACTERIA #/AREA URNS AUTO: ABNORMAL /HPF
BASOPHILS # BLD AUTO: 0.05 X10*3/UL (ref 0–0.1)
BASOPHILS NFR BLD AUTO: 0.6 %
BILIRUB SERPL-MCNC: 0.4 MG/DL (ref 0.1–1.2)
BILIRUB UR STRIP.AUTO-MCNC: NEGATIVE MG/DL
BUN SERPL-MCNC: 15 MG/DL (ref 8–25)
CALCIUM SERPL-MCNC: 9.7 MG/DL (ref 8.5–10.4)
CHLORIDE SERPL-SCNC: 106 MMOL/L (ref 97–107)
CO2 SERPL-SCNC: 27 MMOL/L (ref 24–31)
COLOR UR: ABNORMAL
CREAT SERPL-MCNC: 1.1 MG/DL (ref 0.4–1.6)
EGFRCR SERPLBLD CKD-EPI 2021: 50 ML/MIN/1.73M*2
EOSINOPHIL # BLD AUTO: 0.14 X10*3/UL (ref 0–0.4)
EOSINOPHIL NFR BLD AUTO: 1.7 %
ERYTHROCYTE [DISTWIDTH] IN BLOOD BY AUTOMATED COUNT: 14.7 % (ref 11.5–14.5)
GLUCOSE SERPL-MCNC: 96 MG/DL (ref 65–99)
GLUCOSE UR STRIP.AUTO-MCNC: NORMAL MG/DL
HCT VFR BLD AUTO: 42.1 % (ref 36–46)
HGB BLD-MCNC: 13.5 G/DL (ref 12–16)
HYALINE CASTS #/AREA URNS AUTO: ABNORMAL /LPF
IMM GRANULOCYTES # BLD AUTO: 0.02 X10*3/UL (ref 0–0.5)
IMM GRANULOCYTES NFR BLD AUTO: 0.2 % (ref 0–0.9)
KETONES UR STRIP.AUTO-MCNC: ABNORMAL MG/DL
LEUKOCYTE ESTERASE UR QL STRIP.AUTO: ABNORMAL
LYMPHOCYTES # BLD AUTO: 2.17 X10*3/UL (ref 0.8–3)
LYMPHOCYTES NFR BLD AUTO: 25.7 %
MCH RBC QN AUTO: 29.3 PG (ref 26–34)
MCHC RBC AUTO-ENTMCNC: 32.1 G/DL (ref 32–36)
MCV RBC AUTO: 92 FL (ref 80–100)
MONOCYTES # BLD AUTO: 1.28 X10*3/UL (ref 0.05–0.8)
MONOCYTES NFR BLD AUTO: 15.1 %
MUCOUS THREADS #/AREA URNS AUTO: ABNORMAL /LPF
NEUTROPHILS # BLD AUTO: 4.8 X10*3/UL (ref 1.6–5.5)
NEUTROPHILS NFR BLD AUTO: 56.7 %
NITRITE UR QL STRIP.AUTO: ABNORMAL
NRBC BLD-RTO: 0 /100 WBCS (ref 0–0)
PH UR STRIP.AUTO: 5.5 [PH]
PLATELET # BLD AUTO: 227 X10*3/UL (ref 150–450)
POTASSIUM SERPL-SCNC: 4.3 MMOL/L (ref 3.4–5.1)
PROT SERPL-MCNC: 7.1 G/DL (ref 5.9–7.9)
PROT UR STRIP.AUTO-MCNC: ABNORMAL MG/DL
RBC # BLD AUTO: 4.6 X10*6/UL (ref 4–5.2)
RBC # UR STRIP.AUTO: ABNORMAL /UL
RBC #/AREA URNS AUTO: >20 /HPF
SODIUM SERPL-SCNC: 142 MMOL/L (ref 133–145)
SP GR UR STRIP.AUTO: 1.03
SQUAMOUS #/AREA URNS AUTO: ABNORMAL /HPF
UROBILINOGEN UR STRIP.AUTO-MCNC: NORMAL MG/DL
WBC # BLD AUTO: 8.5 X10*3/UL (ref 4.4–11.3)
WBC #/AREA URNS AUTO: >50 /HPF
WBC CLUMPS #/AREA URNS AUTO: ABNORMAL /HPF

## 2024-06-19 PROCEDURE — 81001 URINALYSIS AUTO W/SCOPE: CPT

## 2024-06-19 PROCEDURE — 99284 EMERGENCY DEPT VISIT MOD MDM: CPT

## 2024-06-19 PROCEDURE — 96361 HYDRATE IV INFUSION ADD-ON: CPT

## 2024-06-19 PROCEDURE — 87086 URINE CULTURE/COLONY COUNT: CPT | Mod: WESLAB

## 2024-06-19 PROCEDURE — 96374 THER/PROPH/DIAG INJ IV PUSH: CPT

## 2024-06-19 PROCEDURE — 85025 COMPLETE CBC W/AUTO DIFF WBC: CPT

## 2024-06-19 PROCEDURE — 36415 COLL VENOUS BLD VENIPUNCTURE: CPT

## 2024-06-19 PROCEDURE — 84075 ASSAY ALKALINE PHOSPHATASE: CPT

## 2024-06-19 PROCEDURE — 2500000004 HC RX 250 GENERAL PHARMACY W/ HCPCS (ALT 636 FOR OP/ED)

## 2024-06-19 PROCEDURE — 2550000001 HC RX 255 CONTRASTS

## 2024-06-19 PROCEDURE — 74177 CT ABD & PELVIS W/CONTRAST: CPT

## 2024-06-19 RX ORDER — ONDANSETRON HYDROCHLORIDE 2 MG/ML
4 INJECTION, SOLUTION INTRAVENOUS ONCE
Status: COMPLETED | OUTPATIENT
Start: 2024-06-19 | End: 2024-06-19

## 2024-06-19 ASSESSMENT — COLUMBIA-SUICIDE SEVERITY RATING SCALE - C-SSRS
6. HAVE YOU EVER DONE ANYTHING, STARTED TO DO ANYTHING, OR PREPARED TO DO ANYTHING TO END YOUR LIFE?: NO
2. HAVE YOU ACTUALLY HAD ANY THOUGHTS OF KILLING YOURSELF?: NO
1. IN THE PAST MONTH, HAVE YOU WISHED YOU WERE DEAD OR WISHED YOU COULD GO TO SLEEP AND NOT WAKE UP?: NO

## 2024-06-19 ASSESSMENT — LIFESTYLE VARIABLES
HAVE PEOPLE ANNOYED YOU BY CRITICIZING YOUR DRINKING: NO
EVER HAD A DRINK FIRST THING IN THE MORNING TO STEADY YOUR NERVES TO GET RID OF A HANGOVER: NO
HAVE YOU EVER FELT YOU SHOULD CUT DOWN ON YOUR DRINKING: NO
EVER FELT BAD OR GUILTY ABOUT YOUR DRINKING: NO
TOTAL SCORE: 0

## 2024-06-19 ASSESSMENT — PAIN - FUNCTIONAL ASSESSMENT: PAIN_FUNCTIONAL_ASSESSMENT: 0-10

## 2024-06-19 ASSESSMENT — PAIN SCALES - GENERAL: PAINLEVEL_OUTOF10: 0 - NO PAIN

## 2024-06-20 VITALS
TEMPERATURE: 98.2 F | WEIGHT: 175 LBS | BODY MASS INDEX: 31.01 KG/M2 | OXYGEN SATURATION: 94 % | RESPIRATION RATE: 26 BRPM | HEIGHT: 63 IN | DIASTOLIC BLOOD PRESSURE: 98 MMHG | SYSTOLIC BLOOD PRESSURE: 171 MMHG | HEART RATE: 97 BPM

## 2024-06-20 LAB — HOLD SPECIMEN: NORMAL

## 2024-06-20 PROCEDURE — 2500000001 HC RX 250 WO HCPCS SELF ADMINISTERED DRUGS (ALT 637 FOR MEDICARE OP)

## 2024-06-20 PROCEDURE — 74177 CT ABD & PELVIS W/CONTRAST: CPT | Mod: FOREIGN READ | Performed by: RADIOLOGY

## 2024-06-20 RX ORDER — CEPHALEXIN 500 MG/1
500 CAPSULE ORAL ONCE
Status: COMPLETED | OUTPATIENT
Start: 2024-06-20 | End: 2024-06-20

## 2024-06-20 RX ORDER — CEPHALEXIN 500 MG/1
500 CAPSULE ORAL 2 TIMES DAILY
Qty: 14 CAPSULE | Refills: 0 | Status: SHIPPED | OUTPATIENT
Start: 2024-06-20 | End: 2024-06-27

## 2024-06-20 NOTE — ED PROVIDER NOTES
"HPI   Chief Complaint   Patient presents with    Urinary Frequency     Presents to ED from  for concerns of pyelonephritis.  States increased urination with pain over the \"past few days\".         HPI  Patient is an 84-year-old female presenting referred from urgent care for evaluation of burning with urination and urinary frequency over the past 3 days.  Patient states she was sent from the ER for concerns of a kidney infection.  Patient states that over the past 3 days she has had increased burning with urination, urinary urgency and frequency and also has been dribbling which is not normal for her.  She denies fever or chills but states that she did have an episode of vomiting today and has been nauseous.  She was recently discharged from the hospital at the end of May for treatment of pyelonephritis and was given 5-day course of doxycycline and cefdinir.  States that her symptoms did resolve but are now back again.  She otherwise denies chest pain or shortness of breath.  Denies abdominal pain.                  No data recorded                     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/09/2022    Combined form of age-related cataract, both eyes    Disorder of the skin and subcutaneous tissue, unspecified 01/12/2017    Skin lesion    Dorsalgia, unspecified 12/09/2013    Pain, upper back    Dyskinesia of esophagus 09/16/2019    Esophageal spasm    Elevated blood-pressure reading, without diagnosis of hypertension 04/08/2019    Elevated blood pressure reading    Encounter for screening for lipoid disorders 04/18/2019    Encounter for lipid screening for cardiovascular disease    Essential (primary) hypertension 09/10/2018    Benign essential " hypertension    Irritable bowel syndrome with diarrhea 11/07/2019    Irritable bowel syndrome with diarrhea    Lumbago with sciatica, left side 04/24/2019    Chronic bilateral low back pain with bilateral sciatica    Mastodynia 09/26/2019    Breast tenderness in female    Menopausal and female climacteric states 08/19/2019    Female climacteric state    Other conditions influencing health status 08/30/2018    History of burning on urination    Other conditions influencing health status 09/09/2013    Foot pain, unspecified laterality    Other conditions influencing health status     Colonoscopy (Fiberoptic)    Other conditions influencing health status     Mammogram    Other dysphagia 11/07/2019    Esophageal dysphagia    Other dysphagia 12/10/2019    Esophageal dysphagia    Other specified soft tissue disorders 06/26/2019    Left leg swelling    Pain in right hip 11/13/2019    Right hip pain    Pain in right shoulder 06/26/2019    Chronic right shoulder pain    Pain in thoracic spine 08/26/2020    Chronic bilateral thoracic back pain    Personal history of other diseases of the circulatory system     History of hypertension    Personal history of other diseases of the digestive system 10/03/2019    History of gastroesophageal reflux (GERD)    Personal history of other diseases of the musculoskeletal system and connective tissue 12/15/2020    History of low back pain    Personal history of other drug therapy 10/12/2016    History of influenza vaccination    Personal history of other drug therapy 02/09/2021    History of pneumococcal vaccination    Personal history of other endocrine, nutritional and metabolic disease 09/10/2018    History of vitamin D deficiency    Personal history of other medical treatment 07/20/2017    History of screening mammography    Personal history of other mental and behavioral disorders 08/19/2019    History of anxiety    Personal history of other specified conditions 09/10/2018    History  of fatigue    Personal history of other specified conditions 05/01/2019    History of abnormal mammogram    Personal history of other specified conditions 10/19/2017    History of precordial chest pain    Personal history of other specified conditions 07/07/2020    History of dizziness    Personal history of other specified conditions 08/30/2018    History of painful urination    Personal history of other specified conditions 09/23/2018    History of diarrhea    Personal history of urinary (tract) infections 09/10/2018    History of urinary tract infection    Primary osteoarthritis, unspecified hand 07/07/2020    Hand arthritis    Sacrococcygeal disorders, not elsewhere classified 12/15/2020    Sacroiliac joint dysfunction of right side    Trigger finger, unspecified finger 10/12/2016    Trigger finger, acquired    Unspecified atrial fibrillation (Multi) 10/08/2018    New onset a-fib    Unsteadiness on feet 03/11/2020    Unsteadiness on feet    Vitreous degeneration, bilateral 08/26/2019    Degeneration of posterior vitreous body of both eyes     Past Surgical History:   Procedure Laterality Date    ESOPHAGOGASTRODUODENOSCOPY  03/11/2014    Diagnostic Esophagogastroduodenoscopy    GASTRIC FUNDOPLICATION  05/27/2014    Esophagogastric Fundoplasty Nissen Fundoplication    OTHER SURGICAL HISTORY  05/26/2022    Rotator cuff repair    OTHER SURGICAL HISTORY  05/26/2022    Appendectomy    OTHER SURGICAL HISTORY  05/26/2022    Partial hysterectomy     Family History   Problem Relation Name Age of Onset    Diabetes Father      Coronary artery disease Sister      Coronary artery disease Brother      Glaucoma Brother      Coronary artery disease Other Family Hx     Ovarian cancer Sibling       Social History     Tobacco Use    Smoking status: Never    Smokeless tobacco: Never   Vaping Use    Vaping status: Never Used   Substance Use Topics    Alcohol use: Not Currently    Drug use: Never       Physical Exam   ED Triage Vitals  [06/19/24 2049]   Temperature Heart Rate Respirations BP   36.8 °C (98.2 °F) 83 18 (!) 124/97      Pulse Ox Temp Source Heart Rate Source Patient Position   98 % Tympanic Monitor --      BP Location FiO2 (%)     -- --       Physical Exam  Vitals and nursing note reviewed.   Constitutional:       General: She is not in acute distress.     Appearance: She is well-developed.      Comments: Pleasant and well-appearing 84-year-old female   HENT:      Head: Normocephalic and atraumatic.   Eyes:      Conjunctiva/sclera: Conjunctivae normal.   Cardiovascular:      Rate and Rhythm: Normal rate and regular rhythm.      Heart sounds: No murmur heard.  Pulmonary:      Effort: Pulmonary effort is normal. No respiratory distress.      Breath sounds: Normal breath sounds.   Abdominal:      Palpations: Abdomen is soft.      Tenderness: There is no abdominal tenderness.      Comments: Abdomen is nondistended nontender   Musculoskeletal:         General: No swelling.      Cervical back: Neck supple.   Skin:     General: Skin is warm and dry.      Capillary Refill: Capillary refill takes less than 2 seconds.   Neurological:      Mental Status: She is alert.   Psychiatric:         Mood and Affect: Mood normal.         ED Course & MDM   Diagnoses as of 06/21/24 1018   Acute cystitis with hematuria       Medical Decision Making  Parts of this chart have been completed using voice recognition software. Please excuse any errors of transcription.  My thought process and reason for plan has been formulated from the time that I saw the patient until the time of disposition and is not specific to one specific moment during their visit and furthermore my MDM encompasses this entire chart and not only this text box.      HPI: Detailed above.    Exam: A medically appropriate exam performed, outlined above, given the known history and presentation.    History obtained from: Patient    Social Determinants of Health considered during this visit:  Lives independently    Medications given during visit:  Medications   sodium chloride 0.9 % bolus 1,000 mL (0 mL intravenous Stopped 6/20/24 0028)   ondansetron (Zofran) injection 4 mg (4 mg intravenous Given 6/19/24 2328)   iohexol (OMNIPaque) 350 mg iodine/mL solution 75 mL (75 mL intravenous Given 6/19/24 7127)   cephalexin (Keflex) capsule 500 mg (500 mg oral Given 6/20/24 0141)        Diagnostic/tests  Labs Reviewed   CBC WITH AUTO DIFFERENTIAL - Abnormal       Result Value    WBC 8.5      nRBC 0.0      RBC 4.60      Hemoglobin 13.5      Hematocrit 42.1      MCV 92      MCH 29.3      MCHC 32.1      RDW 14.7 (*)     Platelets 227      Neutrophils % 56.7      Immature Granulocytes %, Automated 0.2      Lymphocytes % 25.7      Monocytes % 15.1      Eosinophils % 1.7      Basophils % 0.6      Neutrophils Absolute 4.80      Immature Granulocytes Absolute, Automated 0.02      Lymphocytes Absolute 2.17      Monocytes Absolute 1.28 (*)     Eosinophils Absolute 0.14      Basophils Absolute 0.05     COMPREHENSIVE METABOLIC PANEL - Abnormal    Glucose 96      Sodium 142      Potassium 4.3      Chloride 106      Bicarbonate 27      Urea Nitrogen 15      Creatinine 1.10      eGFR 50 (*)     Calcium 9.7      Albumin 3.8      Alkaline Phosphatase 68      Total Protein 7.1      AST 20      Bilirubin, Total 0.4      ALT 11      Anion Gap 9     URINALYSIS WITH REFLEX CULTURE AND MICROSCOPIC - Abnormal    Color, Urine Dark-Yellow      Appearance, Urine Ex.Turbid (*)     Specific Gravity, Urine 1.027      pH, Urine 5.5      Protein, Urine 200 (2+) (*)     Glucose, Urine Normal      Blood, Urine OVER (3+) (*)     Ketones, Urine TRACE (*)     Bilirubin, Urine NEGATIVE      Urobilinogen, Urine Normal      Nitrite, Urine 2+ (*)     Leukocyte Esterase, Urine 500 Anna/µL (*)    MICROSCOPIC ONLY, URINE - Abnormal    WBC, Urine >50 (*)     WBC Clumps, Urine MANY      RBC, Urine >20 (*)     Squamous Epithelial Cells, Urine 1-9 (SPARSE)       Bacteria, Urine 1+ (*)     Mucus, Urine 4+      Hyaline Casts, Urine 3+ (*)    URINE CULTURE   URINALYSIS WITH REFLEX CULTURE AND MICROSCOPIC    Narrative:     The following orders were created for panel order Urinalysis with Reflex Culture and Microscopic.  Procedure                               Abnormality         Status                     ---------                               -----------         ------                     Urinalysis with Reflex C...[[id number]]  Abnormal            Final result               Extra Urine Gray Tube[[id number]]                            Final result                 Please view results for these tests on the individual orders.   EXTRA URINE GRAY TUBE    Extra Tube Hold for add-ons.        CT abdomen pelvis w IV contrast   Final Result   No definite acute intra-abdominal pathology identified.  Questionable   minimal wall thickening and mucosal enhancement along the left wall of   the decompressed urinary bladder, limiting assessment.  Correlate with   urinalysis to exclude any possibility of early or mild cystitis.   Cardiomegaly with a trace pericardial effusion.   Small to moderate-sized sliding-type hiatal hernia.   Additional chronic changes as described.   Signed by Giancarlo Chaparro           Considerations/further MDM:  84-year-old female presenting for evaluation of urinary frequency.  Visit the patient is resting in hospital bed in no acute distress.  She is afebrile during the visit.  Her vital signs are significant for hypertension but otherwise unremarkable.  She has no CVA tenderness on exam.  She was provided a 1 L fluid bolus and Zofran for symptom relief.  Laboratory workup without leukocytosis or impairment to renal function.  Urinalysis is significant for a urinary tract infection.  CT abdomen pelvis is with evidence of early and mild acute cystitis but without evidence of pyelonephritis or obstructing stone.  Given patient has a uncomplicated urinary tract infection  and is afebrile without leukocytosis and without flank pain or fever do believe outpatient therapy is appropriate at this time.  I did discuss this with the patient and she is agreeable with this.  She was provided first dose of Keflex in the ER and instructed to follow-up with her primary care provider regarding the results of her urine culture within the next 72 hours.  She was also provided a urologist to follow-up with.  Discharged with a p.o. prescription for Keflex for treatment.  I discussed the laboratory and imaging findings with the patient at bedside. Patient's questions and concerns were addressed. Patient was released in good condition, discharged with instructions to follow up with primary care provider and appropriate specialist, and to return to ED at any time for worsening symptoms or any other concerns. Patient demonstrates understanding of the findings and the importance of appropriate follow up care.        Procedure  Procedures     Radha Pompa PA-C  06/21/24 1022

## 2024-06-20 NOTE — DISCHARGE INSTRUCTIONS
Please follow-up with your primary care provider and the urologist provided below for evaluation of your recurrent urinary tract infections.  Please take Keflex by mouth twice daily for 7 days for treatment of your urinary tract infection.  Present back to ER for any new onset or worsening of symptoms such as fever, worsening pain, confusion despite antibiotic therapy.  Please refer to the urinary tract infection discharge instructions for additional information.

## 2024-06-22 LAB — BACTERIA UR CULT: ABNORMAL

## 2024-06-28 ENCOUNTER — PHARMACY VISIT (OUTPATIENT)
Dept: PHARMACY | Facility: CLINIC | Age: 84
End: 2024-06-28
Payer: COMMERCIAL

## 2024-06-28 DIAGNOSIS — I48.91 ATRIAL FIBRILLATION, UNSPECIFIED TYPE (MULTI): ICD-10-CM

## 2024-06-28 PROCEDURE — RXMED WILLOW AMBULATORY MEDICATION CHARGE

## 2024-06-28 RX ORDER — DIGOXIN 125 MCG
125 TABLET ORAL DAILY
Qty: 30 TABLET | Refills: 0 | Status: CANCELLED | OUTPATIENT
Start: 2024-06-28

## 2024-06-29 ENCOUNTER — PHARMACY VISIT (OUTPATIENT)
Dept: PHARMACY | Facility: CLINIC | Age: 84
End: 2024-06-29
Payer: COMMERCIAL

## 2024-06-29 ENCOUNTER — TELEPHONE (OUTPATIENT)
Dept: PHARMACY | Facility: HOSPITAL | Age: 84
End: 2024-06-29
Payer: MEDICARE

## 2024-06-29 DIAGNOSIS — N39.0 COMPLICATED UTI (URINARY TRACT INFECTION): Primary | ICD-10-CM

## 2024-06-29 PROCEDURE — RXMED WILLOW AMBULATORY MEDICATION CHARGE

## 2024-06-29 RX ORDER — AMOXICILLIN AND CLAVULANATE POTASSIUM 875; 125 MG/1; MG/1
875 TABLET, FILM COATED ORAL 2 TIMES DAILY
Qty: 20 TABLET | Refills: 0 | Status: SHIPPED | OUTPATIENT
Start: 2024-06-29 | End: 2024-07-09

## 2024-06-29 NOTE — PROGRESS NOTES
EDPD Note: Bug-Drug Mismatch    Contacted Ms. Mercedes Castellanos regarding a positive urine culture/result that was taken during their recent emergency room visit. I completed education with daughter. The patient is not being treated appropriately with Keflex 500 mg BID x 7 days.     Patient presented to the ED from an urgent care with dysuria, increased urinary frequency, nausea, and one episode of vomiting. She was discharged with Keflex 500 mg BID x 7 days for UTI which is typically not indicated for Complicated UTI. Patient's daughter reports patient doesn't answer the phone, but symptoms have not improved and she is now having chills. Unable to determine flank pain since she's always in pain. Recommending Augmentin over Bactrim due to interaction with Bactrim and digoxin. Counseled her on the new antibiotic and to stop Keflex and follow-up with provider if symptoms persist or worsen.    The following prescription was sent to the patient's preferred pharmacy. No further follow up needed from EDPD Team.   Drug: Augmentin 875-125 mg  Si PO BID x 10 days  Days Supply: 10    Susceptibility data from last 90 days.  Collected Specimen Info Organism Ampicillin Cefazolin Cefazolin (uncomplicated UTIs only) Ciprofloxacin Gentamicin Nitrofurantoin Piperacillin/Tazobactam Trimethoprim/Sulfamethoxazole   24 Urine from Clean Catch/Voided Escherichia coli  S  S  S  S  S  S  S  S   24 Urine from Clean Catch/Voided Escherichia coli  S  S  S  S  S  S  S  S       Yamileth Méndez, PharmD

## 2024-07-01 PROCEDURE — RXMED WILLOW AMBULATORY MEDICATION CHARGE

## 2024-07-01 RX ORDER — DIGOXIN 125 MCG
125 TABLET ORAL DAILY
Qty: 30 TABLET | Refills: 0 | Status: SHIPPED | OUTPATIENT
Start: 2024-07-01

## 2024-07-02 ENCOUNTER — PHARMACY VISIT (OUTPATIENT)
Dept: PHARMACY | Facility: CLINIC | Age: 84
End: 2024-07-02
Payer: COMMERCIAL

## 2024-07-12 ENCOUNTER — LAB (OUTPATIENT)
Dept: LAB | Facility: LAB | Age: 84
End: 2024-07-12
Payer: MEDICARE

## 2024-07-12 DIAGNOSIS — N39.0 URINARY TRACT INFECTION, SITE NOT SPECIFIED: Primary | ICD-10-CM

## 2024-07-12 DIAGNOSIS — N39.0 URINARY TRACT INFECTION, SITE NOT SPECIFIED: ICD-10-CM

## 2024-07-12 LAB
APPEARANCE UR: ABNORMAL
BILIRUB UR STRIP.AUTO-MCNC: NEGATIVE MG/DL
COLOR UR: YELLOW
GLUCOSE UR STRIP.AUTO-MCNC: NORMAL MG/DL
HYALINE CASTS #/AREA URNS AUTO: ABNORMAL /LPF
KETONES UR STRIP.AUTO-MCNC: NEGATIVE MG/DL
LEUKOCYTE ESTERASE UR QL STRIP.AUTO: ABNORMAL
MUCOUS THREADS #/AREA URNS AUTO: ABNORMAL /LPF
NITRITE UR QL STRIP.AUTO: NEGATIVE
PH UR STRIP.AUTO: 6 [PH]
PROT UR STRIP.AUTO-MCNC: ABNORMAL MG/DL
RBC # UR STRIP.AUTO: NEGATIVE /UL
RBC #/AREA URNS AUTO: ABNORMAL /HPF
SP GR UR STRIP.AUTO: 1.02
SQUAMOUS #/AREA URNS AUTO: ABNORMAL /HPF
TRANS CELLS #/AREA UR COMP ASSIST: ABNORMAL /HPF
UROBILINOGEN UR STRIP.AUTO-MCNC: NORMAL MG/DL
WBC #/AREA URNS AUTO: ABNORMAL /HPF

## 2024-07-12 PROCEDURE — 87086 URINE CULTURE/COLONY COUNT: CPT

## 2024-07-12 PROCEDURE — 81001 URINALYSIS AUTO W/SCOPE: CPT

## 2024-07-13 LAB — HOLD SPECIMEN: NORMAL

## 2024-07-14 LAB — BACTERIA UR CULT: NORMAL

## 2024-07-31 ENCOUNTER — LAB REQUISITION (OUTPATIENT)
Dept: LAB | Facility: HOSPITAL | Age: 84
End: 2024-07-31
Payer: MEDICARE

## 2024-07-31 ENCOUNTER — LAB (OUTPATIENT)
Dept: LAB | Facility: LAB | Age: 84
End: 2024-07-31
Payer: MEDICARE

## 2024-07-31 DIAGNOSIS — N39.0 URINARY TRACT INFECTION, SITE NOT SPECIFIED: ICD-10-CM

## 2024-07-31 DIAGNOSIS — N18.31 CHRONIC KIDNEY DISEASE, STAGE 3A (MULTI): Primary | ICD-10-CM

## 2024-07-31 LAB
ALBUMIN SERPL-MCNC: 4.2 G/DL (ref 3.5–5)
ALP BLD-CCNC: 77 U/L (ref 35–125)
ALT SERPL-CCNC: 12 U/L (ref 5–40)
ANION GAP SERPL CALC-SCNC: 11 MMOL/L
AST SERPL-CCNC: 19 U/L (ref 5–40)
BILIRUB SERPL-MCNC: 0.5 MG/DL (ref 0.1–1.2)
BUN SERPL-MCNC: 11 MG/DL (ref 8–25)
CALCIUM SERPL-MCNC: 9.7 MG/DL (ref 8.5–10.4)
CHLORIDE SERPL-SCNC: 106 MMOL/L (ref 97–107)
CO2 SERPL-SCNC: 26 MMOL/L (ref 24–31)
CREAT SERPL-MCNC: 0.9 MG/DL (ref 0.4–1.6)
EGFRCR SERPLBLD CKD-EPI 2021: 63 ML/MIN/1.73M*2
GLUCOSE SERPL-MCNC: 104 MG/DL (ref 65–99)
POTASSIUM SERPL-SCNC: 4.8 MMOL/L (ref 3.4–5.1)
PROT SERPL-MCNC: 7.7 G/DL (ref 5.9–7.9)
SODIUM SERPL-SCNC: 143 MMOL/L (ref 133–145)

## 2024-07-31 PROCEDURE — 36415 COLL VENOUS BLD VENIPUNCTURE: CPT

## 2024-07-31 PROCEDURE — 80053 COMPREHEN METABOLIC PANEL: CPT

## 2024-07-31 PROCEDURE — 87086 URINE CULTURE/COLONY COUNT: CPT

## 2024-08-01 LAB — BACTERIA UR CULT: NORMAL

## 2024-08-06 ENCOUNTER — LAB REQUISITION (OUTPATIENT)
Dept: LAB | Facility: HOSPITAL | Age: 84
End: 2024-08-06
Payer: MEDICARE

## 2024-08-06 DIAGNOSIS — N39.0 URINARY TRACT INFECTION, SITE NOT SPECIFIED: ICD-10-CM

## 2024-08-06 PROCEDURE — 87186 SC STD MICRODIL/AGAR DIL: CPT

## 2024-08-06 PROCEDURE — 87086 URINE CULTURE/COLONY COUNT: CPT

## 2024-08-09 LAB — BACTERIA UR CULT: ABNORMAL

## 2024-08-27 ENCOUNTER — OFFICE VISIT (OUTPATIENT)
Dept: CARDIOLOGY | Facility: CLINIC | Age: 84
End: 2024-08-27
Payer: MEDICARE

## 2024-08-27 ENCOUNTER — LAB (OUTPATIENT)
Dept: LAB | Facility: LAB | Age: 84
End: 2024-08-27
Payer: MEDICARE

## 2024-08-27 VITALS
HEART RATE: 96 BPM | SYSTOLIC BLOOD PRESSURE: 148 MMHG | WEIGHT: 173 LBS | OXYGEN SATURATION: 97 % | DIASTOLIC BLOOD PRESSURE: 98 MMHG | BODY MASS INDEX: 30.65 KG/M2

## 2024-08-27 DIAGNOSIS — R94.31 ABNORMAL ELECTROCARDIOGRAM (ECG) (EKG): ICD-10-CM

## 2024-08-27 DIAGNOSIS — I95.9 HYPOTENSION, UNSPECIFIED HYPOTENSION TYPE: ICD-10-CM

## 2024-08-27 LAB — DIGOXIN SERPL-MCNC: 1.3 NG/ML (ref 0.7–2.1)

## 2024-08-27 PROCEDURE — 3080F DIAST BP >= 90 MM HG: CPT | Performed by: INTERNAL MEDICINE

## 2024-08-27 PROCEDURE — 80162 ASSAY OF DIGOXIN TOTAL: CPT

## 2024-08-27 PROCEDURE — 1036F TOBACCO NON-USER: CPT | Performed by: INTERNAL MEDICINE

## 2024-08-27 PROCEDURE — 99214 OFFICE O/P EST MOD 30 MIN: CPT | Performed by: INTERNAL MEDICINE

## 2024-08-27 PROCEDURE — 36415 COLL VENOUS BLD VENIPUNCTURE: CPT

## 2024-08-27 PROCEDURE — 1126F AMNT PAIN NOTED NONE PRSNT: CPT | Performed by: INTERNAL MEDICINE

## 2024-08-27 PROCEDURE — 3077F SYST BP >= 140 MM HG: CPT | Performed by: INTERNAL MEDICINE

## 2024-08-27 RX ORDER — POTASSIUM &MAGNESIUM ASPARTATE 250-250 MG
CAPSULE ORAL
COMMUNITY

## 2024-08-27 RX ORDER — MIDODRINE HYDROCHLORIDE 2.5 MG/1
2.5 TABLET ORAL
Qty: 60 TABLET | Refills: 11 | Status: SHIPPED | OUTPATIENT
Start: 2024-08-27 | End: 2025-08-27

## 2024-08-27 ASSESSMENT — ENCOUNTER SYMPTOMS
LOSS OF SENSATION IN FEET: 0
DEPRESSION: 0
OCCASIONAL FEELINGS OF UNSTEADINESS: 1

## 2024-08-27 ASSESSMENT — PAIN SCALES - GENERAL: PAINLEVEL: 0-NO PAIN

## 2024-08-27 NOTE — ASSESSMENT & PLAN NOTE
Continue rate control strategy with low-dose atenolol and anticoagulation with Eliquis twice daily.

## 2024-08-27 NOTE — PROGRESS NOTES
Subjective      Chief Complaint   Patient presents with    2 month follow up        Here to reassess atrial fibrillation therapy using a rate control strategy with anticoagulation, and in the presence of low-dose atenolol she was having orthostatic hypotension and dizziness so we started low-dose midodrine therapy which was successful at maintaining good blood pressure management so that she was no longer dizzy.    Previous: Patient was seen on an inpatient basis while in the hospital ,and evaluated by cardiology for an episode of A-fib with RVR.  Patient developed orthostatic blood pressure.  Medications were adjusted, final recommendation from cardiology was midodrine, atenolol, and digoxin with close outpatient follow-up.  Patient this morning is negative for orthostatic blood pressure and feeling well.  She is ambulating to the bathroom independently denies any dizziness or lightheadedness.          Review of Systems   All other systems reviewed and are negative.       Objective   Physical Exam  Constitutional:       Appearance: Normal appearance.   HENT:      Head: Normocephalic and atraumatic.   Eyes:      Pupils: Pupils are equal, round, and reactive to light.   Cardiovascular:      Rate and Rhythm: Normal rate and regular rhythm.      Pulses: Normal pulses.      Heart sounds: Normal heart sounds.   Pulmonary:      Effort: Pulmonary effort is normal.      Breath sounds: Normal breath sounds.   Abdominal:      General: Abdomen is flat. Bowel sounds are normal.      Palpations: Abdomen is soft.   Musculoskeletal:         General: Normal range of motion.      Cervical back: Normal range of motion.   Skin:     General: Skin is warm and dry.   Neurological:      General: No focal deficit present.   Psychiatric:         Mood and Affect: Mood normal.         Judgment: Judgment normal.          Lab Review:   Not applicable    PAF (paroxysmal atrial fibrillation) (Multi)  Continue rate control strategy with low-dose  atenolol and anticoagulation with Eliquis twice daily.    Orthostatic hypotension  Continue low-dose midodrine 2 times daily and I suggested that before she takes her third dose she can check her blood pressure at home and if her systolic blood pressure is above 140s systolic and she can hold off on her third dose of midodrine that day    Omron BP cuff

## 2024-08-27 NOTE — PATIENT INSTRUCTIONS
PAF (paroxysmal atrial fibrillation) (Multi)  Continue rate control strategy with low-dose atenolol and anticoagulation with Eliquis twice daily.    Orthostatic hypotension  Continue low-dose midodrine 2 times daily and I suggested that before she takes her third dose she can check her blood pressure at home and if her systolic blood pressure is above 140s systolic and she can hold off on her third dose of midodrine that day    Omron BP cuff

## 2024-08-27 NOTE — ASSESSMENT & PLAN NOTE
Continue low-dose midodrine 2 times daily and I suggested that before she takes her third dose she can check her blood pressure at home and if her systolic blood pressure is above 140s systolic and she can hold off on her third dose of midodrine that day    Omron BP cuff

## 2024-08-28 DIAGNOSIS — I48.91 ATRIAL FIBRILLATION, UNSPECIFIED TYPE (MULTI): ICD-10-CM

## 2024-08-28 RX ORDER — DIGOXIN 125 MCG
125 TABLET ORAL 3 TIMES WEEKLY
Qty: 30 TABLET | Refills: 0 | Status: SHIPPED | OUTPATIENT
Start: 2024-08-28

## 2024-09-03 ENCOUNTER — APPOINTMENT (OUTPATIENT)
Dept: PRIMARY CARE | Facility: CLINIC | Age: 84
End: 2024-09-03
Payer: MEDICARE

## 2024-09-23 ENCOUNTER — HOSPITAL ENCOUNTER (EMERGENCY)
Facility: HOSPITAL | Age: 84
Discharge: OTHER INSTITUTION | End: 2024-09-24
Attending: STUDENT IN AN ORGANIZED HEALTH CARE EDUCATION/TRAINING PROGRAM
Payer: MEDICARE

## 2024-09-23 DIAGNOSIS — U07.1 COVID-19: Primary | ICD-10-CM

## 2024-09-23 PROCEDURE — 99285 EMERGENCY DEPT VISIT HI MDM: CPT

## 2024-09-23 ASSESSMENT — PAIN SCALES - GENERAL: PAINLEVEL_OUTOF10: 0 - NO PAIN

## 2024-09-23 ASSESSMENT — PAIN - FUNCTIONAL ASSESSMENT: PAIN_FUNCTIONAL_ASSESSMENT: 0-10

## 2024-09-24 ENCOUNTER — APPOINTMENT (OUTPATIENT)
Dept: RADIOLOGY | Facility: HOSPITAL | Age: 84
End: 2024-09-24
Payer: MEDICARE

## 2024-09-24 ENCOUNTER — APPOINTMENT (OUTPATIENT)
Dept: VASCULAR MEDICINE | Facility: HOSPITAL | Age: 84
End: 2024-09-24
Payer: MEDICARE

## 2024-09-24 ENCOUNTER — HOSPITAL ENCOUNTER (INPATIENT)
Facility: HOSPITAL | Age: 84
LOS: 2 days | Discharge: HOME HEALTH | End: 2024-09-27
Attending: INTERNAL MEDICINE | Admitting: FAMILY MEDICINE
Payer: MEDICARE

## 2024-09-24 ENCOUNTER — APPOINTMENT (OUTPATIENT)
Dept: CARDIOLOGY | Facility: HOSPITAL | Age: 84
End: 2024-09-24
Payer: MEDICARE

## 2024-09-24 VITALS
DIASTOLIC BLOOD PRESSURE: 74 MMHG | OXYGEN SATURATION: 94 % | BODY MASS INDEX: 25.76 KG/M2 | TEMPERATURE: 98.9 F | HEIGHT: 62 IN | SYSTOLIC BLOOD PRESSURE: 152 MMHG | RESPIRATION RATE: 16 BRPM | HEART RATE: 86 BPM | WEIGHT: 140 LBS

## 2024-09-24 DIAGNOSIS — I95.1 ORTHOSTATIC HYPOTENSION: ICD-10-CM

## 2024-09-24 DIAGNOSIS — R55 SYNCOPE AND COLLAPSE: Primary | ICD-10-CM

## 2024-09-24 DIAGNOSIS — R68.89 FORGETFULNESS: ICD-10-CM

## 2024-09-24 DIAGNOSIS — I48.0 PAF (PAROXYSMAL ATRIAL FIBRILLATION) (MULTI): ICD-10-CM

## 2024-09-24 LAB
ALBUMIN SERPL BCP-MCNC: 3.7 G/DL (ref 3.4–5)
ALP SERPL-CCNC: 55 U/L (ref 33–136)
ALT SERPL W P-5'-P-CCNC: 10 U/L (ref 7–45)
ANION GAP SERPL CALCULATED.3IONS-SCNC: 13 MMOL/L (ref 10–20)
APPEARANCE UR: CLEAR
AST SERPL W P-5'-P-CCNC: 17 U/L (ref 9–39)
BASOPHILS # BLD AUTO: 0.05 X10*3/UL (ref 0–0.1)
BASOPHILS NFR BLD AUTO: 0.5 %
BILIRUB SERPL-MCNC: 0.6 MG/DL (ref 0–1.2)
BILIRUB UR STRIP.AUTO-MCNC: NEGATIVE MG/DL
BNP SERPL-MCNC: 436 PG/ML (ref 0–99)
BUN SERPL-MCNC: 13 MG/DL (ref 6–23)
CALCIUM SERPL-MCNC: 9 MG/DL (ref 8.6–10.3)
CARDIAC TROPONIN I PNL SERPL HS: 16 NG/L (ref 0–13)
CARDIAC TROPONIN I PNL SERPL HS: 16 NG/L (ref 0–13)
CHLORIDE SERPL-SCNC: 101 MMOL/L (ref 98–107)
CO2 SERPL-SCNC: 24 MMOL/L (ref 21–32)
COLOR UR: YELLOW
CREAT SERPL-MCNC: 0.9 MG/DL (ref 0.5–1.05)
CRP SERPL-MCNC: 4.96 MG/DL
DIGOXIN SERPL-MCNC: 0.79 NG/ML (ref 0.8–?)
EGFRCR SERPLBLD CKD-EPI 2021: 63 ML/MIN/1.73M*2
EOSINOPHIL # BLD AUTO: 0 X10*3/UL (ref 0–0.4)
EOSINOPHIL NFR BLD AUTO: 0 %
ERYTHROCYTE [DISTWIDTH] IN BLOOD BY AUTOMATED COUNT: 14.4 % (ref 11.5–14.5)
FERRITIN SERPL-MCNC: 70 NG/ML (ref 8–150)
FLUAV RNA RESP QL NAA+PROBE: NOT DETECTED
FLUBV RNA RESP QL NAA+PROBE: NOT DETECTED
GLUCOSE SERPL-MCNC: 101 MG/DL (ref 74–99)
GLUCOSE UR STRIP.AUTO-MCNC: NORMAL MG/DL
HCT VFR BLD AUTO: 44.2 % (ref 36–46)
HGB BLD-MCNC: 14.7 G/DL (ref 12–16)
HOLD SPECIMEN: NORMAL
IMM GRANULOCYTES # BLD AUTO: 0.03 X10*3/UL (ref 0–0.5)
IMM GRANULOCYTES NFR BLD AUTO: 0.3 % (ref 0–0.9)
KETONES UR STRIP.AUTO-MCNC: ABNORMAL MG/DL
LEUKOCYTE ESTERASE UR QL STRIP.AUTO: ABNORMAL
LIPASE SERPL-CCNC: 15 U/L (ref 9–82)
LYMPHOCYTES # BLD AUTO: 0.85 X10*3/UL (ref 0.8–3)
LYMPHOCYTES NFR BLD AUTO: 8.2 %
MAGNESIUM SERPL-MCNC: 2.1 MG/DL (ref 1.6–2.4)
MCH RBC QN AUTO: 29.5 PG (ref 26–34)
MCHC RBC AUTO-ENTMCNC: 33.3 G/DL (ref 32–36)
MCV RBC AUTO: 89 FL (ref 80–100)
MONOCYTES # BLD AUTO: 1.59 X10*3/UL (ref 0.05–0.8)
MONOCYTES NFR BLD AUTO: 15.3 %
MUCOUS THREADS #/AREA URNS AUTO: ABNORMAL /LPF
NEUTROPHILS # BLD AUTO: 7.87 X10*3/UL (ref 1.6–5.5)
NEUTROPHILS NFR BLD AUTO: 75.7 %
NITRITE UR QL STRIP.AUTO: NEGATIVE
NRBC BLD-RTO: 0 /100 WBCS (ref 0–0)
PH UR STRIP.AUTO: 6 [PH]
PLATELET # BLD AUTO: 203 X10*3/UL (ref 150–450)
POTASSIUM SERPL-SCNC: 3.8 MMOL/L (ref 3.5–5.3)
PROT SERPL-MCNC: 7.3 G/DL (ref 6.4–8.2)
PROT UR STRIP.AUTO-MCNC: ABNORMAL MG/DL
RBC # BLD AUTO: 4.99 X10*6/UL (ref 4–5.2)
RBC # UR STRIP.AUTO: NEGATIVE /UL
RBC #/AREA URNS AUTO: ABNORMAL /HPF
SARS-COV-2 RNA RESP QL NAA+PROBE: DETECTED
SODIUM SERPL-SCNC: 134 MMOL/L (ref 136–145)
SP GR UR STRIP.AUTO: 1.02
SQUAMOUS #/AREA URNS AUTO: ABNORMAL /HPF
UROBILINOGEN UR STRIP.AUTO-MCNC: NORMAL MG/DL
WBC # BLD AUTO: 10.4 X10*3/UL (ref 4.4–11.3)
WBC #/AREA URNS AUTO: ABNORMAL /HPF

## 2024-09-24 PROCEDURE — 70450 CT HEAD/BRAIN W/O DYE: CPT

## 2024-09-24 PROCEDURE — 81001 URINALYSIS AUTO W/SCOPE: CPT

## 2024-09-24 PROCEDURE — 36415 COLL VENOUS BLD VENIPUNCTURE: CPT

## 2024-09-24 PROCEDURE — 99223 1ST HOSP IP/OBS HIGH 75: CPT | Performed by: PHYSICIAN ASSISTANT

## 2024-09-24 PROCEDURE — G0378 HOSPITAL OBSERVATION PER HR: HCPCS

## 2024-09-24 PROCEDURE — 80162 ASSAY OF DIGOXIN TOTAL: CPT | Performed by: PHYSICIAN ASSISTANT

## 2024-09-24 PROCEDURE — XW033E5 INTRODUCTION OF REMDESIVIR ANTI-INFECTIVE INTO PERIPHERAL VEIN, PERCUTANEOUS APPROACH, NEW TECHNOLOGY GROUP 5: ICD-10-PCS | Performed by: FAMILY MEDICINE

## 2024-09-24 PROCEDURE — 94760 N-INVAS EAR/PLS OXIMETRY 1: CPT

## 2024-09-24 PROCEDURE — 86140 C-REACTIVE PROTEIN: CPT | Performed by: INTERNAL MEDICINE

## 2024-09-24 PROCEDURE — 96360 HYDRATION IV INFUSION INIT: CPT

## 2024-09-24 PROCEDURE — 80053 COMPREHEN METABOLIC PANEL: CPT

## 2024-09-24 PROCEDURE — 83690 ASSAY OF LIPASE: CPT

## 2024-09-24 PROCEDURE — 84484 ASSAY OF TROPONIN QUANT: CPT | Mod: 91

## 2024-09-24 PROCEDURE — 93880 EXTRACRANIAL BILAT STUDY: CPT

## 2024-09-24 PROCEDURE — 2500000004 HC RX 250 GENERAL PHARMACY W/ HCPCS (ALT 636 FOR OP/ED)

## 2024-09-24 PROCEDURE — 87086 URINE CULTURE/COLONY COUNT: CPT | Mod: WESLAB

## 2024-09-24 PROCEDURE — 96361 HYDRATE IV INFUSION ADD-ON: CPT

## 2024-09-24 PROCEDURE — 99223 1ST HOSP IP/OBS HIGH 75: CPT | Performed by: STUDENT IN AN ORGANIZED HEALTH CARE EDUCATION/TRAINING PROGRAM

## 2024-09-24 PROCEDURE — 83735 ASSAY OF MAGNESIUM: CPT

## 2024-09-24 PROCEDURE — 2500000005 HC RX 250 GENERAL PHARMACY W/O HCPCS: Performed by: PHYSICIAN ASSISTANT

## 2024-09-24 PROCEDURE — 70450 CT HEAD/BRAIN W/O DYE: CPT | Performed by: RADIOLOGY

## 2024-09-24 PROCEDURE — 87636 SARSCOV2 & INF A&B AMP PRB: CPT

## 2024-09-24 PROCEDURE — 2500000004 HC RX 250 GENERAL PHARMACY W/ HCPCS (ALT 636 FOR OP/ED): Mod: JZ | Performed by: INTERNAL MEDICINE

## 2024-09-24 PROCEDURE — 85025 COMPLETE CBC W/AUTO DIFF WBC: CPT

## 2024-09-24 PROCEDURE — 82728 ASSAY OF FERRITIN: CPT | Performed by: INTERNAL MEDICINE

## 2024-09-24 PROCEDURE — 93880 EXTRACRANIAL BILAT STUDY: CPT | Performed by: INTERNAL MEDICINE

## 2024-09-24 PROCEDURE — 84484 ASSAY OF TROPONIN QUANT: CPT

## 2024-09-24 PROCEDURE — 2500000001 HC RX 250 WO HCPCS SELF ADMINISTERED DRUGS (ALT 637 FOR MEDICARE OP): Performed by: STUDENT IN AN ORGANIZED HEALTH CARE EDUCATION/TRAINING PROGRAM

## 2024-09-24 PROCEDURE — 71045 X-RAY EXAM CHEST 1 VIEW: CPT

## 2024-09-24 PROCEDURE — 83880 ASSAY OF NATRIURETIC PEPTIDE: CPT

## 2024-09-24 PROCEDURE — 2500000001 HC RX 250 WO HCPCS SELF ADMINISTERED DRUGS (ALT 637 FOR MEDICARE OP): Performed by: PHYSICIAN ASSISTANT

## 2024-09-24 PROCEDURE — 71045 X-RAY EXAM CHEST 1 VIEW: CPT | Performed by: RADIOLOGY

## 2024-09-24 PROCEDURE — 93005 ELECTROCARDIOGRAM TRACING: CPT

## 2024-09-24 RX ORDER — DIGOXIN 125 MCG
125 TABLET ORAL 3 TIMES WEEKLY
Status: DISCONTINUED | OUTPATIENT
Start: 2024-09-25 | End: 2024-09-27 | Stop reason: HOSPADM

## 2024-09-24 RX ORDER — ESCITALOPRAM OXALATE 10 MG/1
10 TABLET ORAL DAILY
Status: DISCONTINUED | OUTPATIENT
Start: 2024-09-24 | End: 2024-09-27 | Stop reason: HOSPADM

## 2024-09-24 RX ORDER — BENZONATATE 100 MG/1
100 CAPSULE ORAL 3 TIMES DAILY PRN
Status: DISCONTINUED | OUTPATIENT
Start: 2024-09-24 | End: 2024-09-27 | Stop reason: HOSPADM

## 2024-09-24 RX ORDER — CEPHALEXIN 500 MG/1
500 CAPSULE ORAL ONCE
Status: DISCONTINUED | OUTPATIENT
Start: 2024-09-24 | End: 2024-09-24 | Stop reason: HOSPADM

## 2024-09-24 RX ORDER — SODIUM CHLORIDE 9 MG/ML
75 INJECTION, SOLUTION INTRAVENOUS CONTINUOUS
Status: DISCONTINUED | OUTPATIENT
Start: 2024-09-24 | End: 2024-09-24

## 2024-09-24 RX ORDER — PANTOPRAZOLE SODIUM 40 MG/1
40 TABLET, DELAYED RELEASE ORAL
Status: DISCONTINUED | OUTPATIENT
Start: 2024-09-25 | End: 2024-09-27 | Stop reason: HOSPADM

## 2024-09-24 RX ORDER — ONDANSETRON HYDROCHLORIDE 2 MG/ML
4 INJECTION, SOLUTION INTRAVENOUS EVERY 6 HOURS PRN
Status: DISCONTINUED | OUTPATIENT
Start: 2024-09-24 | End: 2024-09-27 | Stop reason: HOSPADM

## 2024-09-24 RX ORDER — MIDODRINE HYDROCHLORIDE 5 MG/1
2.5 TABLET ORAL
Status: DISCONTINUED | OUTPATIENT
Start: 2024-09-24 | End: 2024-09-27 | Stop reason: HOSPADM

## 2024-09-24 RX ORDER — ACETAMINOPHEN 325 MG/1
650 TABLET ORAL EVERY 6 HOURS PRN
Status: DISCONTINUED | OUTPATIENT
Start: 2024-09-24 | End: 2024-09-27 | Stop reason: HOSPADM

## 2024-09-24 RX ORDER — LIDOCAINE HYDROCHLORIDE AND EPINEPHRINE 10; 10 MG/ML; UG/ML
30 INJECTION, SOLUTION INFILTRATION; PERINEURAL ONCE
Status: DISCONTINUED | OUTPATIENT
Start: 2024-09-24 | End: 2024-09-24 | Stop reason: HOSPADM

## 2024-09-24 RX ORDER — DEXAMETHASONE 6 MG/1
6 TABLET ORAL ONCE
Status: DISCONTINUED | OUTPATIENT
Start: 2024-09-24 | End: 2024-09-24 | Stop reason: HOSPADM

## 2024-09-24 RX ORDER — ATENOLOL 50 MG/1
25 TABLET ORAL DAILY
Status: DISCONTINUED | OUTPATIENT
Start: 2024-09-24 | End: 2024-09-27 | Stop reason: HOSPADM

## 2024-09-24 SDOH — ECONOMIC STABILITY: FOOD INSECURITY
WITHIN THE PAST 12 MONTHS, YOU WORRIED THAT YOUR FOOD WOULD RUN OUT BEFORE YOU GOT MONEY TO BUY MORE.: PATIENT UNABLE TO ANSWER

## 2024-09-24 SDOH — SOCIAL STABILITY: SOCIAL NETWORK: HOW OFTEN DO YOU ATTEND CHURCH OR RELIGIOUS SERVICES?: NEVER

## 2024-09-24 SDOH — SOCIAL STABILITY: SOCIAL INSECURITY: WITHIN THE LAST YEAR, HAVE YOU BEEN HUMILIATED OR EMOTIONALLY ABUSED IN OTHER WAYS BY YOUR PARTNER OR EX-PARTNER?: NO

## 2024-09-24 SDOH — HEALTH STABILITY: PHYSICAL HEALTH: ON AVERAGE, HOW MANY DAYS PER WEEK DO YOU ENGAGE IN MODERATE TO STRENUOUS EXERCISE (LIKE A BRISK WALK)?: 0 DAYS

## 2024-09-24 SDOH — SOCIAL STABILITY: SOCIAL INSECURITY: ABUSE: ADULT

## 2024-09-24 SDOH — HEALTH STABILITY: MENTAL HEALTH: HOW OFTEN DO YOU HAVE 6 OR MORE DRINKS ON ONE OCCASION?: NEVER

## 2024-09-24 SDOH — HEALTH STABILITY: MENTAL HEALTH
HOW OFTEN DO YOU NEED TO HAVE SOMEONE HELP YOU WHEN YOU READ INSTRUCTIONS, PAMPHLETS, OR OTHER WRITTEN MATERIAL FROM YOUR DOCTOR OR PHARMACY?: SOMETIMES

## 2024-09-24 SDOH — SOCIAL STABILITY: SOCIAL INSECURITY: HAVE YOU HAD THOUGHTS OF HARMING ANYONE ELSE?: NO

## 2024-09-24 SDOH — ECONOMIC STABILITY: INCOME INSECURITY: IN THE PAST 12 MONTHS, HAS THE ELECTRIC, GAS, OIL, OR WATER COMPANY THREATENED TO SHUT OFF SERVICE IN YOUR HOME?: NO

## 2024-09-24 SDOH — SOCIAL STABILITY: SOCIAL NETWORK: HOW OFTEN DO YOU ATTENT MEETINGS OF THE CLUB OR ORGANIZATION YOU BELONG TO?: NEVER

## 2024-09-24 SDOH — ECONOMIC STABILITY: INCOME INSECURITY: IN THE LAST 12 MONTHS, WAS THERE A TIME WHEN YOU WERE NOT ABLE TO PAY THE MORTGAGE OR RENT ON TIME?: NO

## 2024-09-24 SDOH — SOCIAL STABILITY: SOCIAL INSECURITY: DOES ANYONE TRY TO KEEP YOU FROM HAVING/CONTACTING OTHER FRIENDS OR DOING THINGS OUTSIDE YOUR HOME?: NO

## 2024-09-24 SDOH — ECONOMIC STABILITY: HOUSING INSECURITY: AT ANY TIME IN THE PAST 12 MONTHS, WERE YOU HOMELESS OR LIVING IN A SHELTER (INCLUDING NOW)?: NO

## 2024-09-24 SDOH — HEALTH STABILITY: MENTAL HEALTH: HOW MANY STANDARD DRINKS CONTAINING ALCOHOL DO YOU HAVE ON A TYPICAL DAY?: PATIENT DOES NOT DRINK

## 2024-09-24 SDOH — SOCIAL STABILITY: SOCIAL INSECURITY: ARE THERE ANY APPARENT SIGNS OF INJURIES/BEHAVIORS THAT COULD BE RELATED TO ABUSE/NEGLECT?: NO

## 2024-09-24 SDOH — SOCIAL STABILITY: SOCIAL INSECURITY: HAS ANYONE EVER THREATENED TO HURT YOUR FAMILY OR YOUR PETS?: NO

## 2024-09-24 SDOH — HEALTH STABILITY: MENTAL HEALTH: HOW OFTEN DO YOU HAVE A DRINK CONTAINING ALCOHOL?: NEVER

## 2024-09-24 SDOH — ECONOMIC STABILITY: INCOME INSECURITY: HOW HARD IS IT FOR YOU TO PAY FOR THE VERY BASICS LIKE FOOD, HOUSING, MEDICAL CARE, AND HEATING?: NOT VERY HARD

## 2024-09-24 SDOH — SOCIAL STABILITY: SOCIAL INSECURITY: WITHIN THE LAST YEAR, HAVE YOU BEEN AFRAID OF YOUR PARTNER OR EX-PARTNER?: NO

## 2024-09-24 SDOH — SOCIAL STABILITY: SOCIAL INSECURITY: HAVE YOU HAD ANY THOUGHTS OF HARMING ANYONE ELSE?: NO

## 2024-09-24 SDOH — HEALTH STABILITY: PHYSICAL HEALTH: ON AVERAGE, HOW MANY MINUTES DO YOU ENGAGE IN EXERCISE AT THIS LEVEL?: 0 MIN

## 2024-09-24 SDOH — SOCIAL STABILITY: SOCIAL NETWORK: ARE YOU MARRIED, WIDOWED, DIVORCED, SEPARATED, NEVER MARRIED, OR LIVING WITH A PARTNER?: DIVORCED

## 2024-09-24 SDOH — SOCIAL STABILITY: SOCIAL INSECURITY: DO YOU FEEL ANYONE HAS EXPLOITED OR TAKEN ADVANTAGE OF YOU FINANCIALLY OR OF YOUR PERSONAL PROPERTY?: NO

## 2024-09-24 SDOH — ECONOMIC STABILITY: FOOD INSECURITY
WITHIN THE PAST 12 MONTHS, THE FOOD YOU BOUGHT JUST DIDN'T LAST AND YOU DIDN'T HAVE MONEY TO GET MORE.: PATIENT UNABLE TO ANSWER

## 2024-09-24 SDOH — SOCIAL STABILITY: SOCIAL INSECURITY: ARE YOU OR HAVE YOU BEEN THREATENED OR ABUSED PHYSICALLY, EMOTIONALLY, OR SEXUALLY BY ANYONE?: NO

## 2024-09-24 SDOH — SOCIAL STABILITY: SOCIAL NETWORK: HOW OFTEN DO YOU GET TOGETHER WITH FRIENDS OR RELATIVES?: ONCE A WEEK

## 2024-09-24 SDOH — SOCIAL STABILITY: SOCIAL NETWORK
IN A TYPICAL WEEK, HOW MANY TIMES DO YOU TALK ON THE PHONE WITH FAMILY, FRIENDS, OR NEIGHBORS?: MORE THAN THREE TIMES A WEEK

## 2024-09-24 SDOH — ECONOMIC STABILITY: HOUSING INSECURITY: IN THE PAST 12 MONTHS, HOW MANY TIMES HAVE YOU MOVED WHERE YOU WERE LIVING?: 0

## 2024-09-24 SDOH — SOCIAL STABILITY: SOCIAL INSECURITY: DO YOU FEEL UNSAFE GOING BACK TO THE PLACE WHERE YOU ARE LIVING?: NO

## 2024-09-24 ASSESSMENT — COGNITIVE AND FUNCTIONAL STATUS - GENERAL
MOBILITY SCORE: 22
PATIENT BASELINE BEDBOUND: NO
DAILY ACTIVITIY SCORE: 24
WALKING IN HOSPITAL ROOM: A LITTLE
DAILY ACTIVITIY SCORE: 24
CLIMB 3 TO 5 STEPS WITH RAILING: A LITTLE
CLIMB 3 TO 5 STEPS WITH RAILING: A LITTLE
WALKING IN HOSPITAL ROOM: A LITTLE
MOBILITY SCORE: 22

## 2024-09-24 ASSESSMENT — COLUMBIA-SUICIDE SEVERITY RATING SCALE - C-SSRS
1. IN THE PAST MONTH, HAVE YOU WISHED YOU WERE DEAD OR WISHED YOU COULD GO TO SLEEP AND NOT WAKE UP?: NO
2. HAVE YOU ACTUALLY HAD ANY THOUGHTS OF KILLING YOURSELF?: NO
1. IN THE PAST MONTH, HAVE YOU WISHED YOU WERE DEAD OR WISHED YOU COULD GO TO SLEEP AND NOT WAKE UP?: NO
2. HAVE YOU ACTUALLY HAD ANY THOUGHTS OF KILLING YOURSELF?: NO
6. HAVE YOU EVER DONE ANYTHING, STARTED TO DO ANYTHING, OR PREPARED TO DO ANYTHING TO END YOUR LIFE?: NO
6. HAVE YOU EVER DONE ANYTHING, STARTED TO DO ANYTHING, OR PREPARED TO DO ANYTHING TO END YOUR LIFE?: NO

## 2024-09-24 ASSESSMENT — LIFESTYLE VARIABLES
HAVE PEOPLE ANNOYED YOU BY CRITICIZING YOUR DRINKING: NO
AUDIT-C TOTAL SCORE: 0
PRESCIPTION_ABUSE_PAST_12_MONTHS: NO
EVER FELT BAD OR GUILTY ABOUT YOUR DRINKING: NO
TOTAL SCORE: 0
HAVE YOU EVER FELT YOU SHOULD CUT DOWN ON YOUR DRINKING: NO
EVER HAD A DRINK FIRST THING IN THE MORNING TO STEADY YOUR NERVES TO GET RID OF A HANGOVER: NO
HOW MANY STANDARD DRINKS CONTAINING ALCOHOL DO YOU HAVE ON A TYPICAL DAY: PATIENT DOES NOT DRINK
SKIP TO QUESTIONS 9-10: 1
SKIP TO QUESTIONS 9-10: 1
SUBSTANCE_ABUSE_PAST_12_MONTHS: NO
AUDIT-C TOTAL SCORE: 0
AUDIT-C TOTAL SCORE: 0
HOW OFTEN DO YOU HAVE 6 OR MORE DRINKS ON ONE OCCASION: NEVER
HOW OFTEN DO YOU HAVE A DRINK CONTAINING ALCOHOL: NEVER

## 2024-09-24 ASSESSMENT — PAIN SCALES - GENERAL: PAINLEVEL_OUTOF10: 0 - NO PAIN

## 2024-09-24 ASSESSMENT — ENCOUNTER SYMPTOMS
DIFFICULTY URINATING: 0
CHEST TIGHTNESS: 0
TROUBLE SWALLOWING: 0
DIARRHEA: 0
VOMITING: 0
EYE REDNESS: 0
PALPITATIONS: 0
FREQUENCY: 0
UNEXPECTED WEIGHT CHANGE: 0
HEMATURIA: 0
LIGHT-HEADEDNESS: 0
FEVER: 0
WHEEZING: 0
PHOTOPHOBIA: 0
WOUND: 0
FLANK PAIN: 0
NAUSEA: 0
DYSURIA: 0
RHINORRHEA: 0
DIAPHORESIS: 0
FACIAL ASYMMETRY: 0
MYALGIAS: 0
CONSTIPATION: 0
NUMBNESS: 0
COUGH: 1
ABDOMINAL PAIN: 0
SORE THROAT: 0
CONFUSION: 0
DIZZINESS: 0
SHORTNESS OF BREATH: 1
HALLUCINATIONS: 0
SLEEP DISTURBANCE: 0
HEADACHES: 0
SEIZURES: 0
DECREASED CONCENTRATION: 0
WEAKNESS: 1

## 2024-09-24 ASSESSMENT — ACTIVITIES OF DAILY LIVING (ADL)
FEEDING YOURSELF: INDEPENDENT
PATIENT'S MEMORY ADEQUATE TO SAFELY COMPLETE DAILY ACTIVITIES?: NO
WALKS IN HOME: INDEPENDENT
BATHING: INDEPENDENT
HEARING - LEFT EAR: FUNCTIONAL
HEARING - RIGHT EAR: FUNCTIONAL
DRESSING YOURSELF: INDEPENDENT
ASSISTIVE_DEVICE: DENTURES LOWER;DENTURES UPPER
JUDGMENT_ADEQUATE_SAFELY_COMPLETE_DAILY_ACTIVITIES: YES
GROOMING: INDEPENDENT
ADEQUATE_TO_COMPLETE_ADL: YES
TOILETING: INDEPENDENT

## 2024-09-24 ASSESSMENT — PATIENT HEALTH QUESTIONNAIRE - PHQ9
2. FEELING DOWN, DEPRESSED OR HOPELESS: NOT AT ALL
1. LITTLE INTEREST OR PLEASURE IN DOING THINGS: NOT AT ALL
SUM OF ALL RESPONSES TO PHQ9 QUESTIONS 1 & 2: 0

## 2024-09-24 ASSESSMENT — PAIN - FUNCTIONAL ASSESSMENT: PAIN_FUNCTIONAL_ASSESSMENT: 0-10

## 2024-09-24 NOTE — CARE PLAN
"The patient's goals for the shift include \"get better\"    The clinical goals for the shift include HDS    "

## 2024-09-24 NOTE — CONSULTS
Consults  Referred by MARISSA Balderas MD: Ruth Cervantes MD    Reason For Consult  COVID19    History Of Present Illness  Mercedes Castellanos is a 84 y.o. female, hx of AF, hx of GERD, she was admitted following a fall / syncope at home, her covid screen is positive, the cxr without acute changes, the ua with pyuria, she has been having congestion, dry cough x 2 days, no chest pain or sob, no emesis or diarrhea, no sore throat or headache, no loss of appetite, smell or taste, she is vaccinated.     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), Disorder of the skin and subcutaneous tissue, unspecified (01/12/2017), Dorsalgia, unspecified (12/09/2013), Dyskinesia of esophagus (09/16/2019), Elevated blood-pressure reading, without diagnosis of hypertension (04/08/2019), Encounter for screening for lipoid disorders (04/18/2019), Essential (primary) hypertension (09/10/2018), Irritable bowel syndrome with diarrhea (11/07/2019), Lumbago with sciatica, left side (04/24/2019), Mastodynia (09/26/2019), Menopausal and female climacteric states (08/19/2019), Other conditions influencing health status (08/30/2018), Other conditions influencing health status (09/09/2013), Other conditions influencing health status, Other conditions influencing health status, Other dysphagia (11/07/2019), Other dysphagia (12/10/2019), Other specified soft tissue disorders (06/26/2019), Pain in right hip (11/13/2019), Pain in right shoulder (06/26/2019), Pain in thoracic spine (08/26/2020), Personal history of other diseases of the circulatory system, Personal history of other diseases of the digestive system (10/03/2019), Personal history of other diseases of the musculoskeletal system and connective tissue (12/15/2020), Personal history of  other drug therapy (10/12/2016), Personal history of other drug therapy (02/09/2021), Personal history of other endocrine, nutritional and metabolic disease (09/10/2018), Personal history of other medical treatment (07/20/2017), Personal history of other mental and behavioral disorders (08/19/2019), Personal history of other specified conditions (09/10/2018), Personal history of other specified conditions (05/01/2019), Personal history of other specified conditions (10/19/2017), Personal history of other specified conditions (07/07/2020), Personal history of other specified conditions (08/30/2018), Personal history of other specified conditions (09/23/2018), Personal history of urinary (tract) infections (09/10/2018), Primary osteoarthritis, unspecified hand (07/07/2020), Sacrococcygeal disorders, not elsewhere classified (12/15/2020), Trigger finger, unspecified finger (10/12/2016), Unspecified atrial fibrillation (Multi) (10/08/2018), Unsteadiness on feet (03/11/2020), and Vitreous degeneration, bilateral (08/26/2019).    Surgical History  She has a past surgical history that includes Esophagogastroduodenoscopy (03/11/2014); Gastric fundoplication (05/27/2014); Other surgical history (05/26/2022); Other surgical history (05/26/2022); and Other surgical history (05/26/2022).     Social History     Occupational History    Not on file   Tobacco Use    Smoking status: Never    Smokeless tobacco: Never   Vaping Use    Vaping status: Never Used   Substance and Sexual Activity    Alcohol use: Not Currently    Drug use: Never    Sexual activity: Defer     Travel History   Travel since 08/24/24    No documented travel since 08/24/24          Family History  Family History   Problem Relation Name Age of Onset    Diabetes Father      Coronary artery disease Sister      Coronary artery disease Brother      Glaucoma Brother      Coronary artery disease Other Family Hx     Ovarian cancer Sibling       Allergies  Nitrofurantoin  and Hydrocodone-acetaminophen     Immunization History   Administered Date(s) Administered    Flu vaccine, quadrivalent, high-dose, preservative free, age 65y+ (FLUZONE) 10/31/2022    Flu vaccine, trivalent, preservative free, HIGH-DOSE, age 65y+ (Fluzone) 10/20/2017, 09/22/2020    Influenza, Seasonal, Quadrivalent, Adjuvanted 08/13/2021    Influenza, seasonal, injectable 09/30/2015, 08/13/2021    Influenza, trivalent, adjuvanted 08/20/2019    Moderna SARS-CoV-2 Vaccination 01/29/2021, 02/26/2021, 11/30/2021    Novel influenza-H1N1-09, preservative-free 01/27/2010    Pneumococcal conjugate vaccine, 13-valent (PREVNAR 13) 04/13/2016, 04/30/2019    Pneumococcal polysaccharide vaccine, 23-valent, age 2 years and older (PNEUMOVAX 23) 01/03/2007, 04/24/2020    Tdap vaccine, age 7 year and older (BOOSTRIX, ADACEL) 04/30/2019    Zoster vaccine, recombinant, adult (SHINGRIX) 08/20/2019, 10/31/2019    Zoster, Unspecified 08/20/2019, 10/31/2019    Zoster, live 09/12/2014   Pneumonia vaccine is up to date  Luque fall risk 70, preventive protocol was implemented  Depression screen is negative    Medications  Home medications:  Medications Prior to Admission   Medication Sig Dispense Refill Last Dose    apixaban (Eliquis) 5 mg tablet Take 1 tablet (5 mg) by mouth 2 times a day. 60 tablet 11     atenolol (Tenormin) 50 mg tablet Take 1 tablet (50 mg) by mouth 2 times a day. (Patient taking differently: Take 0.5 tablets (25 mg) by mouth once daily.) 60 tablet 1     B complex-vitamin C-folic acid (Nephro-Radha) 0.8 mg tablet Take 1 tablet by mouth once daily.       cranberry 500 mg capsule Take by mouth.       digoxin (Lanoxin) 125 MCG tablet Take 1 tablet (125 mcg) by mouth 3 times a week. 30 tablet 0     escitalopram (Lexapro) 10 mg tablet Take 1 tablet (10 mg) by mouth once daily. 30 tablet 5     magnesium oxide (Mag-Ox) 400 mg tablet 1 tablet (400 mg) once daily.       melatonin 1 mg tablet, sublingual Place under the tongue.     "   midodrine (Proamatine) 2.5 mg tablet Take 1 tablet (2.5 mg) by mouth 2 times daily (morning and late afternoon). 60 tablet 11     omeprazole (PriLOSEC) 40 mg DR capsule Take 1 capsule (40 mg) by mouth once daily in the morning. Take before meals. 90 capsule 3     potassium chloride CR 10 mEq ER tablet TAKE 1 TABLET BY MOUTH ONCE DAILY WITH FOOD 90 tablet 1      Current medications:  Scheduled medications  apixaban, 5 mg, oral, BID  atenolol, 25 mg, oral, Daily  [START ON 9/25/2024] digoxin, 125 mcg, oral, Once per day on Monday Wednesday Friday  escitalopram, 10 mg, oral, Daily  midodrine, 2.5 mg, oral, BID  [START ON 9/25/2024] pantoprazole, 40 mg, oral, Daily before breakfast      Continuous medications  sodium chloride 0.9%, 75 mL/hr      PRN medications  PRN medications: acetaminophen, ondansetron    Review of Systems   All other systems reviewed and are negative.       Objective  Range of Vitals (last 24 hours)  Heart Rate:  []   Temp:  [36.3 °C (97.3 °F)-37.2 °C (98.9 °F)]   Resp:  [16-26]   BP: (135-179)/(67-97)   Height:  [157.5 cm (5' 2\")]   Weight:  [63.5 kg (140 lb)-79.4 kg (175 lb)]   SpO2:  [92 %-95 %]   Daily Weight  09/24/24 : 78.5 kg (173 lb)    Body mass index is 31.64 kg/m².   Nutritional consult     Physical Exam  Constitutional:       Appearance: Normal appearance.   HENT:      Head: Normocephalic and atraumatic.      Mouth/Throat:      Mouth: Mucous membranes are moist.      Pharynx: Oropharynx is clear.   Eyes:      Pupils: Pupils are equal, round, and reactive to light.   Cardiovascular:      Rate and Rhythm: Normal rate and regular rhythm.      Heart sounds: Normal heart sounds.   Pulmonary:      Effort: Pulmonary effort is normal.      Breath sounds: Normal breath sounds.   Abdominal:      General: Abdomen is flat. Bowel sounds are normal.      Palpations: Abdomen is soft.   Musculoskeletal:      Cervical back: Normal range of motion.   Neurological:      Mental Status: She is " "alert.          Relevant Results  Outside Hospital Results  reviewed  Labs  Results from last 72 hours   Lab Units 09/24/24  0000   WBC AUTO x10*3/uL 10.4   HEMOGLOBIN g/dL 14.7   HEMATOCRIT % 44.2   PLATELETS AUTO x10*3/uL 203   NEUTROS PCT AUTO % 75.7   LYMPHS PCT AUTO % 8.2   MONOS PCT AUTO % 15.3   EOS PCT AUTO % 0.0     Results from last 72 hours   Lab Units 09/24/24  0000   SODIUM mmol/L 134*   POTASSIUM mmol/L 3.8   CHLORIDE mmol/L 101   CO2 mmol/L 24   BUN mg/dL 13   CREATININE mg/dL 0.90   GLUCOSE mg/dL 101*   CALCIUM mg/dL 9.0   ANION GAP mmol/L 13   EGFR mL/min/1.73m*2 63     Results from last 72 hours   Lab Units 09/24/24  0000   ALK PHOS U/L 55   BILIRUBIN TOTAL mg/dL 0.6   PROTEIN TOTAL g/dL 7.3   ALT U/L 10   AST U/L 17   ALBUMIN g/dL 3.7     Estimated Creatinine Clearance: 45.2 mL/min (by C-G formula based on SCr of 0.9 mg/dL).  No results found for: \"CRP\", \"SEDRATE\"  No results found for: \"HIV1X2\", \"HIVCONF\", \"BYHETW8HX\"  No results found for: \"HEPCABINIT\", \"HEPCAB\", \"HCVPCRQUANT\"  Microbiology  Susceptibility data from last 90 days.  Collected Specimen Info Organism Amoxicillin/Clavulanate Ampicillin Ampicillin/Sulbactam Cefazolin Cefazolin (uncomplicated UTIs only) Ciprofloxacin Gentamicin Nitrofurantoin Piperacillin/Tazobactam Trimethoprim/Sulfamethoxazole   08/06/24 Urine from Clean Catch/Voided Klebsiella pneumoniae/variicola  S  R  S  S  S  S  S  S  S  S   Imaging  Reviewed      Assessment/Plan   COVID19 infection, stable O2 on RA, negative cxr  Pyuria    Recommendations :  Remdesivir protocol  Inflammatory markers  Chest PT    I spent minutes in the professional and overall care of this patient.      Jurgen Falk MD  "

## 2024-09-24 NOTE — TREATMENT PLAN
"The patient was seen in conjunction with the advanced practice provider, and I performed a substantive portion of the encounter. The following is my supervisory note.    History:  Patient presents to the ED for generalized weakness and notes she lives alone.  Describes generalized weakness without any focality, URI symptoms for the last few days.  Notes decreased p.o. nutritional intake and subjective dehydration.  Notes she close sick contact with her daughter being sick at home.  Family states patient was found on the ground near her sewing machine and patient cannot recall events.  Patient presently does not endorse any headache, vision changes, neurodeficits of extremities, cardiopulmonary symptoms, /GI symptoms.  Denies any associate abdominal pain or distention.  Not appreciating fever/chills with her URI symptoms.  Denies any other significant systemic symptoms or complaints.    VS:  /74   Pulse 86   Temp 37.2 °C (98.9 °F)   Resp 16   Ht 1.575 m (5' 2\")   Wt 63.5 kg (140 lb)   SpO2 94%   BMI 25.61 kg/m²      Physical exam:  CONST: alert, normal appearance, no acute distress, does not appear ill/toxic  HEAD: normocephalic, atraumatic  NECK: no JVD, nontender and full AROM, no appreciable midline C-spine tenderness and no spinous process step-off/deformities  ENT: MMM, no rhinorrhea/congestion, posterior oropharynx clear and oral secretions well controlled  EYES: PEPRL, EOMI, no scleral icterus, no nystagmus  CV: RRR, no murmurs, 2+ equal/symmetrical pulses x4  PULM: CTAB, no respiratory distress, not requiring supplental O2  ABD: soft, flat/non-tender/non-distended, no mass  SKIN: warm/dry, no pallor or jaundice, no rash  NEURO: A&Ox4, no facial asymmetry, no focal neuro deficits, gross strength/motor/sensation intact x4, normal gait  PSYCH: Appropriate affect      I personally reviewed and interpreted the following studies: EKG is A-fib borderline , normal axis/intervals, no appreciable " ischemia, nonspecific TW findings, labs are significant for positive COVID, stable/flat troponinemia, moderately elevated BNP without baseline, no leukocytosis, UA equivocal for UTI without endorsement of symptoms, images are notable for CXR cardiac enlargement with minimal pulmonary vascular congestion, no pulmonary opacities and CTH no evidence of traumatic ICH .      MDM:  Patient presented to the ED for generalized weakness and URI symptoms, found on ground by family with unknown downtime.  Concerning PMHx of A-fib on Eliquis, HTN, HLD, GERD, prior history of syncope/collapse.    Per Chart Review: Cardiology office on 8/27/2024 visit for hypotension, visit summary significant for report of orthostatic hypotension while taking rate control meds for A-fib, started on low-dose midodrine with appropriate BP control and not transient any symptoms, negative for orthostatics on exam, exam grossly unremarkable/noncontributory, plan for continued management of A-fib and orthostatic hypotension.    Assessment/evaluation multifactorial and concerning for malignant cardiac arrhythmia not identified in the setting of known A-fib with borderline RVR, dehydration, poor attritional intake resulting in exacerbation of orthostatic hypotensive history, COVID viral syndrome, physical deconditioning. No concerning history, clinical evidence/work-up, or exam findings for the concerning differentials of ACS/MI, significant HF/fluid overload, PTX, focal/multifocal PNA, traumatic ICH, significant electrolyte/metabolic derangement, UTI cystitis, anemia. These conditions have been thoroughly evaluated and determined to be sufficiently unlikely to be the etiology of patient's presenting symptoms.     Scores: NIHSS 0    ED Course/Diagnosis:  ED Course as of 09/24/24 1534   Tue Sep 24, 2024   0015 I personally reviewed and interpreted the EKG @0006: Afib 100, normal axis/intervals and no appreciable ischemia, non-specific TW findings, and  prior EKG on 5/27/2024 reviewed without any appreciable specific/identifiable changes. [BC]   0056 Coronavirus 2019, PCR(!): Detected [AJ]   0258 CT head wo IV contrast  IMPRESSION:  Right temporal and right occipital scalp swelling suggesting soft  tissue contusion in the setting of trauma. No underlying depressed  fracture. No acute intracranial hemorrhage, mass effect or midline  shift.      Nonspecific scattered white matter hypodensities favored to represent  sequela of small vessel ischemia.   [BC]   0440 B-Type Natriuretic Peptide(!)  Mild to moderately elevated in the setting of syncope, no gross evidence of fluid overload or acute HF, not requiring supplemental O2 [BC]   0441 Troponin I Series, High Sensitivity (0, 1 HR)(!)  Stable/flat, EKG unremarkable for ischemic changes although notable for A-fib with concern for mild high-output cardiac failure, no concern for ACS/MI [BC]   0441 Sars-CoV-2 PCR(!)  Positive COVID [BC]   0534 Urinalysis with Reflex Culture and Microscopic(!)  Mild ketonuria likely associated with poor nutritional intake consistent with mild starvation ketosis, negative nitrites with moderate leuk esterase and mild to moderate pyuria and mild hematuria concerning for UTI/cystitis [BC]      ED Course User Index  [AJ] Deanna Robertson PA-C  [BC] Tyler Pandya MD         Diagnoses as of 09/24/24 1534   COVID-19       1. COVID-19

## 2024-09-24 NOTE — ED PROVIDER NOTES
HPI   Chief Complaint   Patient presents with    Weakness, Gen     Pt arrived from home with complaint of generalized weakness. Pt states she passed out and fell today.       Patient is 84-year-old female presenting to the emergency department for evaluation of generalized weakness, cough, congestion.  Patient states that symptoms have been going on for a few days.  She states that her daughter is also sick at home.  She states she has not taken any COVID test.  She states she has been coughing but states that it is more of a dry cough.  She states she fell today but is not sure if she hit her head.  She states she thinks she passed out.  She denies any pain at this time.  She denies chest pain, shortness of breath, fevers, chills, nausea, vomiting, abdominal pain, numbness, tingling, hematuria, dysuria, constipation, diarrhea.              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/09/2022    Combined form of age-related cataract, both eyes    Disorder of the skin and subcutaneous tissue, unspecified 01/12/2017    Skin lesion    Dorsalgia, unspecified 12/09/2013    Pain, upper back    Dyskinesia of esophagus 09/16/2019    Esophageal spasm    Elevated blood-pressure reading, without diagnosis of hypertension 04/08/2019    Elevated blood pressure reading    Encounter for screening for lipoid disorders 04/18/2019    Encounter for lipid screening for cardiovascular disease    Essential (primary) hypertension 09/10/2018    Benign essential hypertension    Irritable bowel syndrome with diarrhea 11/07/2019    Irritable bowel syndrome with diarrhea    Lumbago with sciatica, left side 04/24/2019    Chronic bilateral low back pain with bilateral sciatica     Mastodynia 09/26/2019    Breast tenderness in female    Menopausal and female climacteric states 08/19/2019    Female climacteric state    Other conditions influencing health status 08/30/2018    History of burning on urination    Other conditions influencing health status 09/09/2013    Foot pain, unspecified laterality    Other conditions influencing health status     Colonoscopy (Fiberoptic)    Other conditions influencing health status     Mammogram    Other dysphagia 11/07/2019    Esophageal dysphagia    Other dysphagia 12/10/2019    Esophageal dysphagia    Other specified soft tissue disorders 06/26/2019    Left leg swelling    Pain in right hip 11/13/2019    Right hip pain    Pain in right shoulder 06/26/2019    Chronic right shoulder pain    Pain in thoracic spine 08/26/2020    Chronic bilateral thoracic back pain    Personal history of other diseases of the circulatory system     History of hypertension    Personal history of other diseases of the digestive system 10/03/2019    History of gastroesophageal reflux (GERD)    Personal history of other diseases of the musculoskeletal system and connective tissue 12/15/2020    History of low back pain    Personal history of other drug therapy 10/12/2016    History of influenza vaccination    Personal history of other drug therapy 02/09/2021    History of pneumococcal vaccination    Personal history of other endocrine, nutritional and metabolic disease 09/10/2018    History of vitamin D deficiency    Personal history of other medical treatment 07/20/2017    History of screening mammography    Personal history of other mental and behavioral disorders 08/19/2019    History of anxiety    Personal history of other specified conditions 09/10/2018    History of fatigue    Personal history of other specified conditions 05/01/2019    History of abnormal mammogram    Personal history of other specified conditions 10/19/2017    History of precordial chest pain    Personal  history of other specified conditions 07/07/2020    History of dizziness    Personal history of other specified conditions 08/30/2018    History of painful urination    Personal history of other specified conditions 09/23/2018    History of diarrhea    Personal history of urinary (tract) infections 09/10/2018    History of urinary tract infection    Primary osteoarthritis, unspecified hand 07/07/2020    Hand arthritis    Sacrococcygeal disorders, not elsewhere classified 12/15/2020    Sacroiliac joint dysfunction of right side    Trigger finger, unspecified finger 10/12/2016    Trigger finger, acquired    Unspecified atrial fibrillation (Multi) 10/08/2018    New onset a-fib    Unsteadiness on feet 03/11/2020    Unsteadiness on feet    Vitreous degeneration, bilateral 08/26/2019    Degeneration of posterior vitreous body of both eyes     Past Surgical History:   Procedure Laterality Date    ESOPHAGOGASTRODUODENOSCOPY  03/11/2014    Diagnostic Esophagogastroduodenoscopy    GASTRIC FUNDOPLICATION  05/27/2014    Esophagogastric Fundoplasty Nissen Fundoplication    OTHER SURGICAL HISTORY  05/26/2022    Rotator cuff repair    OTHER SURGICAL HISTORY  05/26/2022    Appendectomy    OTHER SURGICAL HISTORY  05/26/2022    Partial hysterectomy     Family History   Problem Relation Name Age of Onset    Diabetes Father      Coronary artery disease Sister      Coronary artery disease Brother      Glaucoma Brother      Coronary artery disease Other Family Hx     Ovarian cancer Sibling       Social History     Tobacco Use    Smoking status: Never    Smokeless tobacco: Never   Vaping Use    Vaping status: Never Used   Substance Use Topics    Alcohol use: Not Currently    Drug use: Never       Physical Exam   ED Triage Vitals   Temp Pulse Resp BP   -- -- -- --      SpO2 Temp src Heart Rate Source Patient Position   -- -- -- --      BP Location FiO2 (%)     -- --       Physical Exam  Vitals and nursing note reviewed.   Constitutional:        General: She is not in acute distress.     Appearance: Normal appearance. She is not ill-appearing or toxic-appearing.   HENT:      Head: Normocephalic and atraumatic.      Mouth/Throat:      Mouth: Mucous membranes are dry.   Eyes:      Extraocular Movements: Extraocular movements intact.      Pupils: Pupils are equal, round, and reactive to light.   Cardiovascular:      Rate and Rhythm: Normal rate and regular rhythm.      Pulses: Normal pulses.   Pulmonary:      Effort: Pulmonary effort is normal.      Breath sounds: Normal breath sounds. No wheezing, rhonchi or rales.   Abdominal:      Palpations: Abdomen is soft.      Tenderness: There is no abdominal tenderness.   Musculoskeletal:         General: Normal range of motion.   Skin:     General: Skin is warm and dry.   Neurological:      General: No focal deficit present.      Mental Status: She is alert and oriented to person, place, and time.      Sensory: No sensory deficit.      Motor: No weakness.   Psychiatric:         Mood and Affect: Mood normal.         Behavior: Behavior normal.           ED Course & [facility]   ED Course as of 09/24/24 1505   Tue Sep 24, 2024   0015 I personally reviewed and interpreted the EKG @0006: Afib 100, normal axis/intervals and no appreciable ischemia, non-specific TW findings, and prior EKG on 5/27/2024 reviewed without any appreciable specific/identifiable changes. [BC]   0056 Coronavirus 2019, PCR(!): Detected [AJ]   0258 CT head wo IV contrast  IMPRESSION:  Right temporal and right occipital scalp swelling suggesting soft  tissue contusion in the setting of trauma. No underlying depressed  fracture. No acute intracranial hemorrhage, mass effect or midline  shift.      Nonspecific scattered white matter hypodensities favored to represent  sequela of small vessel ischemia.   [BC]   0440 B-Type Natriuretic Peptide(!)  Mild to moderately elevated in the setting of syncope, no gross evidence of fluid overload or acute HF, not  requiring supplemental O2 [BC]   0441 Troponin I Series, High Sensitivity (0, 1 HR)(!)  Stable/flat, EKG unremarkable for ischemic changes although notable for A-fib with concern for mild high-output cardiac failure, no concern for ACS/MI [BC]   0441 Sars-CoV-2 PCR(!)  Positive COVID [BC]   0534 Urinalysis with Reflex Culture and Microscopic(!)  Mild ketonuria likely associated with poor nutritional intake consistent with mild starvation ketosis, negative nitrites with moderate leuk esterase and mild to moderate pyuria and mild hematuria concerning for UTI/cystitis [BC]      ED Course User Index  [AJ] Deanna Robertson PA-C  [BC] Tyler Pandya MD         Diagnoses as of 09/24/24 1505   COVID-19                 No data recorded     Alphonse Coma Scale Score: 15 (09/24/24 0034 : Dolores Alexandra RN)                           Medical Decision Making  **Disclaimer parts of this chart have been completed using voice recognition software. Please excuse any errors of transcription.     Patient seen in conjunction with attending physician Dr. Pandya.    HPI: Detailed above.    Exam: A medically appropriate exam performed, outlined above, given the known history and presentation.    History obtained from: Patient    EKG: Reviewed and interpreted by my attending physician    Labs/Diagnostics:  Labs Reviewed   CBC WITH AUTO DIFFERENTIAL - Abnormal       Result Value    WBC 10.4      nRBC 0.0      RBC 4.99      Hemoglobin 14.7      Hematocrit 44.2      MCV 89      MCH 29.5      MCHC 33.3      RDW 14.4      Platelets 203      Neutrophils % 75.7      Immature Granulocytes %, Automated 0.3      Lymphocytes % 8.2      Monocytes % 15.3      Eosinophils % 0.0      Basophils % 0.5      Neutrophils Absolute 7.87 (*)     Immature Granulocytes Absolute, Automated 0.03      Lymphocytes Absolute 0.85      Monocytes Absolute 1.59 (*)     Eosinophils Absolute 0.00      Basophils Absolute 0.05     COMPREHENSIVE METABOLIC PANEL - Abnormal     Glucose 101 (*)     Sodium 134 (*)     Potassium 3.8      Chloride 101      Bicarbonate 24      Anion Gap 13      Urea Nitrogen 13      Creatinine 0.90      eGFR 63      Calcium 9.0      Albumin 3.7      Alkaline Phosphatase 55      Total Protein 7.3      AST 17      Bilirubin, Total 0.6      ALT 10     SARS-COV-2 PCR - Abnormal    Coronavirus 2019, PCR Detected (*)     Narrative:     This assay has received FDA Emergency Use Authorization (EUA) and is only authorized for the duration of time that circumstances exist to justify the authorization of the emergency use of in vitro diagnostic tests for the detection of SARS-CoV-2 virus and/or diagnosis of COVID-19 infection under section 564(b)(1) of the Act, 21 U.S.C. 360bbb-3(b)(1). This assay is an in vitro diagnostic nucleic acid amplification test for the qualitative detection of SARS-CoV-2 from nasopharyngeal specimens and has been validated for use at [facility]. Negative results do not preclude COVID-19 infections and should not be used as the sole basis for diagnosis, treatment, or other management decisions.     URINALYSIS WITH REFLEX CULTURE AND MICROSCOPIC - Abnormal    Color, Urine Yellow      Appearance, Urine Clear      Specific Gravity, Urine 1.020      pH, Urine 6.0      Protein, Urine 20 (TRACE)      Glucose, Urine Normal      Blood, Urine NEGATIVE      Ketones, Urine 10 (1+) (*)     Bilirubin, Urine NEGATIVE      Urobilinogen, Urine Normal      Nitrite, Urine NEGATIVE      Leukocyte Esterase, Urine 500 Anna/µL (*)    SERIAL TROPONIN-INITIAL - Abnormal    Troponin I, High Sensitivity 16 (*)     Narrative:     Less than 99th percentile of normal range cutoff-  Female and children under 18 years old <14 ng/L; Male <21 ng/L: Negative  Repeat testing should be performed if clinically indicated.     Female and children under 18 years old 14-50 ng/L; Male 21-50 ng/L:  Consistent with possible cardiac damage and possible increased  clinical   risk. Serial measurements may help to assess extent of myocardial damage.     >50 ng/L: Consistent with cardiac damage, increased clinical risk and  myocardial infarction. Serial measurements may help assess extent of   myocardial damage.      NOTE: Children less than 1 year old may have higher baseline troponin   levels and results should be interpreted in conjunction with the overall   clinical context.     NOTE: Troponin I testing is performed using a different   testing methodology at [facility] than at other   [facility]. Direct result comparisons should only   be made within the same method.   SERIAL TROPONIN, 1 HOUR - Abnormal    Troponin I, High Sensitivity 16 (*)     Narrative:     Less than 99th percentile of normal range cutoff-  Female and children under 18 years old <14 ng/L; Male <21 ng/L: Negative  Repeat testing should be performed if clinically indicated.     Female and children under 18 years old 14-50 ng/L; Male 21-50 ng/L:  Consistent with possible cardiac damage and possible increased clinical   risk. Serial measurements may help to assess extent of myocardial damage.     >50 ng/L: Consistent with cardiac damage, increased clinical risk and  myocardial infarction. Serial measurements may help assess extent of   myocardial damage.      NOTE: Children less than 1 year old may have higher baseline troponin   levels and results should be interpreted in conjunction with the overall   clinical context.     NOTE: Troponin I testing is performed using a different   testing methodology at [facility] than at other   [facility]. Direct result comparisons should only   be made within the same method.   B-TYPE NATRIURETIC PEPTIDE - Abnormal     (*)     Narrative:        <100 pg/mL - Heart failure unlikely  100-299 pg/mL - Intermediate probability of acute heart                  failure exacerbation. Correlate with clinical                  context and  patient history.    >=300 pg/mL - Heart Failure likely. Correlate with clinical                  context and patient history.    BNP testing is performed using different testing methodology at [facility] than at other [facility]. Direct result comparisons should only be made within the same method.      MICROSCOPIC ONLY, URINE - Abnormal    WBC, Urine 21-50 (*)     RBC, Urine 6-10 (*)     Squamous Epithelial Cells, Urine 1-9 (SPARSE)      Mucus, Urine 2+     MAGNESIUM - Normal    Magnesium 2.10     LIPASE - Normal    Lipase 15      Narrative:     Venipuncture immediately after or during the administration of Metamizole may lead to falsely low results. Testing should be performed immediately prior to Metamizole dosing.   INFLUENZA A AND B PCR - Normal    Flu A Result Not Detected      Flu B Result Not Detected      Narrative:     This assay is an in vitro diagnostic multiplex nucleic acid amplification test for the detection and discrimination of Influenza A & B from nasopharyngeal specimens, and has been validated for use at [facility]. Negative results do not preclude Influenza A/B infections, and should not be used as the sole basis for diagnosis, treatment, or other management decisions. If Influenza A/B and RSV PCR results are negative, testing for Parainfluenza virus, Adenovirus and Metapneumovirus is routinely performed for [facility] pediatric oncology and intensive care inpatients, and is available on other patients by placing an add-on request.   URINE CULTURE   TROPONIN SERIES- (INITIAL, 1 HR)    Narrative:     The following orders were created for panel order Troponin I Series, High Sensitivity (0, 1 HR).  Procedure                               Abnormality         Status                     ---------                               -----------         ------                     Troponin I, High Sensiti...[[id number]]  Abnormal            Final result                Troponin, High Sensitivi...[[id number]]  Abnormal            Final result                 Please view results for these tests on the individual orders.   URINALYSIS WITH REFLEX CULTURE AND MICROSCOPIC    Narrative:     The following orders were created for panel order Urinalysis with Reflex Culture and Microscopic.  Procedure                               Abnormality         Status                     ---------                               -----------         ------                     Urinalysis with Reflex C...[[id number]]  Abnormal            Final result               Extra Urine Gray Tube[[id number]]                            In process                   Please view results for these tests on the individual orders.   EXTRA URINE GRAY TUBE     CT head wo IV contrast   Final Result   Right temporal and right occipital scalp swelling suggesting soft   tissue contusion in the setting of trauma. No underlying depressed   fracture. No acute intracranial hemorrhage, mass effect or midline   shift.        Nonspecific scattered white matter hypodensities favored to represent   sequela of small vessel ischemia.                  MACRO:   None.        Signed by: Evan Finkelstein 9/24/2024 1:39 AM   Dictation workstation:   [id number]      XR chest 1 view   Final Result   Stable cardiomegaly. No acute pulmonary process.        MACRO:   None        Signed by: Rogelio Min 9/24/2024 12:30 AM   Dictation workstation:   [id number]        EMERGENCY DEPARTMENT COURSE and DIFFERENTIAL DIAGNOSIS/MDM:  Patient is 84-year-old female presenting to the emergency department for evaluation of cough, congestion, generalized weakness.  On physical exam vital signs remarkable for systolic hypertension but otherwise stable and patient is in no acute distress.  Lung sounds clear to auscultation bilaterally.  Abdomen nontender to palpation.  No sign of head injury on exam.  Viral swabs ordered as well as cardiac workup and CT of the head  due to patient stating that she fell today and is unsure if she hit her head.  Patient tested positive for COVID likely causing patient symptoms.  Patient is negative for flu.  Lipase normal.  Sodium 134 but otherwise CMP showed no electrolyte abnormalities.  Magnesium normal.  CBC showed no leukocytosis or anemia.  Chest x-ray showed stable cardiomegaly with no acute process with no pneumonia, pneumothorax.  At this time patient CT of the head is pending as well as troponin.  Suspect if CT and troponins come back unremarkable that patient will be discharged home as long as she passes ambulation trial and is feeling better.  Handoff given to attending physician Dr. Pandya.    Vitals:    Vitals:    09/24/24 0230 09/24/24 0330 09/24/24 0430 09/24/24 0700   BP: 152/85 136/71 152/74    Pulse: (!) 101 86 81 86   Resp:       Temp:       SpO2: 95% (!) 93% 95% 94%   Weight:       Height:           History Limited by:    None    Independent history obtained from:    None    External records reviewed:    Outpatient Note cardiology note from 8/27/2024  PAF (paroxysmal atrial fibrillation) (Multi)  Continue rate control strategy with low-dose atenolol and anticoagulation with Eliquis twice daily.     Orthostatic hypotension  Continue low-dose midodrine 2 times daily and I suggested that before she takes her third dose she can check her blood pressure at home and if her systolic blood pressure is above 140s systolic and she can hold off on her third dose of midodrine that day    Diagnostics interpreted by me:    CT Scan(s) see MDM and Xrays - see my independent interpretation in MDM    Discussions with other clinicians:    None    Chronic conditions impacting care:    None    Social determinants of health affecting care:    None    Diagnostic tests considered but not performed: None    ED Medications managed:    Medications   sodium chloride 0.9 % bolus 500 mL (0 mL intravenous Stopped 9/24/24 0214)       Prescription drugs  considered:    None    Screenings:              Procedure  Procedures     Deanna Robertson PA-C  09/24/24 0131       Deanna Robertson PA-C  09/24/24 1504

## 2024-09-24 NOTE — H&P
History Of Present Illness  Mercedes Castellanos is a 84 y.o. female with history of Afib on Eliquis, depression and orthostatic hypotension presenting to OSH with cough, congestion, weakness, SOB with exertion and syncopal episode yesterday. She says she hasn't been feeling well for a couple of days but denies any pain or injury from the syncopal episode.  She did feel LH prior to syncope.  She is not able to recall all events of yesterday. She does not think she has ever been told that she has orthostatic hypotension. She lives alone and states that she still drives. She currently denies CP, n/v/diarrhea/constipation/dysuria/back pain.    Upon arrival /97 HR 89 T 97.3 SpO2 92% on room air at rest. Labs:  Trop 16>16 no WBCs. Covid positive, CXR clear. UA with pyuria. CT head: negative for fractures, but does show right temporal and right occipital scalp swelling suggesting soft tissue contusion. Transferred to [facility] for syncope work up, covid treatment, PT/OT evals.         Past Medical History  Past Medical History:   Diagnosis Date    Acute candidiasis of vulva and vagina 09/21/2018    Yeast vaginitis    Body mass index (BMI) 35.0-35.9, adult     BMI 35.0-35.9,adult    Body mass index (BMI) 36.0-36.9, adult     BMI 36.0-36.9,adult    Combined forms of age-related cataract, bilateral 06/09/2022    Combined form of age-related cataract, both eyes    Disorder of the skin and subcutaneous tissue, unspecified 01/12/2017    Skin lesion    Dorsalgia, unspecified 12/09/2013    Pain, upper back    Dyskinesia of esophagus 09/16/2019    Esophageal spasm    Elevated blood-pressure reading, without diagnosis of hypertension 04/08/2019    Elevated blood pressure reading    Encounter for screening for lipoid disorders 04/18/2019    Encounter for lipid screening for cardiovascular disease    Essential (primary) hypertension 09/10/2018    Benign essential hypertension    Irritable bowel syndrome with diarrhea 11/07/2019     Irritable bowel syndrome with diarrhea    Lumbago with sciatica, left side 04/24/2019    Chronic bilateral low back pain with bilateral sciatica    Mastodynia 09/26/2019    Breast tenderness in female    Menopausal and female climacteric states 08/19/2019    Female climacteric state    Other conditions influencing health status 08/30/2018    History of burning on urination    Other conditions influencing health status 09/09/2013    Foot pain, unspecified laterality    Other conditions influencing health status     Colonoscopy (Fiberoptic)    Other conditions influencing health status     Mammogram    Other dysphagia 11/07/2019    Esophageal dysphagia    Other dysphagia 12/10/2019    Esophageal dysphagia    Other specified soft tissue disorders 06/26/2019    Left leg swelling    Pain in right hip 11/13/2019    Right hip pain    Pain in right shoulder 06/26/2019    Chronic right shoulder pain    Pain in thoracic spine 08/26/2020    Chronic bilateral thoracic back pain    Personal history of other diseases of the circulatory system     History of hypertension    Personal history of other diseases of the digestive system 10/03/2019    History of gastroesophageal reflux (GERD)    Personal history of other diseases of the musculoskeletal system and connective tissue 12/15/2020    History of low back pain    Personal history of other drug therapy 10/12/2016    History of influenza vaccination    Personal history of other drug therapy 02/09/2021    History of pneumococcal vaccination    Personal history of other endocrine, nutritional and metabolic disease 09/10/2018    History of vitamin D deficiency    Personal history of other medical treatment 07/20/2017    History of screening mammography    Personal history of other mental and behavioral disorders 08/19/2019    History of anxiety    Personal history of other specified conditions 09/10/2018    History of fatigue    Personal history of other specified conditions  05/01/2019    History of abnormal mammogram    Personal history of other specified conditions 10/19/2017    History of precordial chest pain    Personal history of other specified conditions 07/07/2020    History of dizziness    Personal history of other specified conditions 08/30/2018    History of painful urination    Personal history of other specified conditions 09/23/2018    History of diarrhea    Personal history of urinary (tract) infections 09/10/2018    History of urinary tract infection    Primary osteoarthritis, unspecified hand 07/07/2020    Hand arthritis    Sacrococcygeal disorders, not elsewhere classified 12/15/2020    Sacroiliac joint dysfunction of right side    Trigger finger, unspecified finger 10/12/2016    Trigger finger, acquired    Unspecified atrial fibrillation (Multi) 10/08/2018    New onset a-fib    Unsteadiness on feet 03/11/2020    Unsteadiness on feet    Vitreous degeneration, bilateral 08/26/2019    Degeneration of posterior vitreous body of both eyes       Surgical History  Past Surgical History:   Procedure Laterality Date    ESOPHAGOGASTRODUODENOSCOPY  03/11/2014    Diagnostic Esophagogastroduodenoscopy    GASTRIC FUNDOPLICATION  05/27/2014    Esophagogastric Fundoplasty Nissen Fundoplication    OTHER SURGICAL HISTORY  05/26/2022    Rotator cuff repair    OTHER SURGICAL HISTORY  05/26/2022    Appendectomy    OTHER SURGICAL HISTORY  05/26/2022    Partial hysterectomy        Social History  She reports that she has never smoked. She has never used smokeless tobacco. She reports that she does not currently use alcohol. She reports that she does not use drugs.    Family History  Family History   Problem Relation Name Age of Onset    Diabetes Father      Coronary artery disease Sister      Coronary artery disease Brother      Glaucoma Brother      Coronary artery disease Other Family Hx     Ovarian cancer Sibling          Allergies  Nitrofurantoin and Hydrocodone-acetaminophen    Review  "of Systems   Constitutional:  Negative for diaphoresis, fever and unexpected weight change.   HENT:  Negative for rhinorrhea, sore throat and trouble swallowing.    Eyes:  Negative for photophobia, redness and visual disturbance.   Respiratory:  Positive for cough and shortness of breath. Negative for chest tightness and wheezing.    Cardiovascular:  Negative for chest pain, palpitations and leg swelling.   Gastrointestinal:  Negative for abdominal pain, constipation, diarrhea, nausea and vomiting.   Endocrine: Negative for polyuria.   Genitourinary:  Negative for difficulty urinating, dysuria, flank pain, frequency, hematuria and urgency.   Musculoskeletal:  Negative for gait problem and myalgias.   Skin:  Negative for rash and wound.   Allergic/Immunologic: Negative for immunocompromised state.   Neurological:  Positive for syncope and weakness. Negative for dizziness, seizures, facial asymmetry, light-headedness, numbness and headaches.   Psychiatric/Behavioral:  Negative for confusion, decreased concentration, hallucinations and sleep disturbance.         Physical Exam   Physical Exam  Gen: NAD  Eyes:  EOM intact  ENT: MMM  Neck: No JVD  Respiratory: CTAB, no wheezes/rhonchi  Cardiac: irregularly irregular with controlled VR  Abdomen: soft, NT, +BS  Extremities: no edema or cyanosis  Neuro: No focal deficits, alert and oriented x 2  Psych:  appropriate mood and behavior, pleasant but forgetful    Last Recorded Vitals  Blood pressure 152/81, pulse 85, temperature 36.3 °C (97.3 °F), temperature source Temporal, resp. rate 26, height 1.575 m (5' 2\"), weight 78.5 kg (173 lb), SpO2 92%.    Weakness with syncope  -in the setting of covid 19 with history of OH  -check orthostats  -OBS tele  -echo 05/2024: preserved EF, left atrium moderately dilated  -check carotid us  -ID consult  -Remdesivir protocol  -PT/OT  -check walking pulse ox    Pyuria  -ID consult   -culture was sent    Afib  -rate is " controlled  -tele  -eliquis  -digoxin, check level  -atenolol    Depression  -lexapro    Memory issues/lives alone  -CT head shows chronic small vessel ischemia  -hold off on MoCA while acutely ill  -social work consult  -PT/OT  -would benefit from SAAP program    DVT prophy  -eliquis    Dispo: OBS tele, eLOS < 48 hours  D/w Dr Andrey ALFARO spent 45 minutes in the professional and overall care of this patient.      DALY SheldonC

## 2024-09-25 PROBLEM — R55 SYNCOPE AND COLLAPSE: Status: RESOLVED | Noted: 2024-09-24 | Resolved: 2024-09-25

## 2024-09-25 LAB
ALBUMIN SERPL BCP-MCNC: 3.3 G/DL (ref 3.4–5)
ALP SERPL-CCNC: 47 U/L (ref 33–136)
ALT SERPL W P-5'-P-CCNC: 10 U/L (ref 7–45)
ANION GAP SERPL CALC-SCNC: 10 MMOL/L (ref 10–20)
AST SERPL W P-5'-P-CCNC: 19 U/L (ref 9–39)
BACTERIA UR CULT: ABNORMAL
BASOPHILS # BLD AUTO: 0.04 X10*3/UL (ref 0–0.1)
BASOPHILS NFR BLD AUTO: 0.6 %
BILIRUB SERPL-MCNC: 0.3 MG/DL (ref 0–1.2)
BUN SERPL-MCNC: 13 MG/DL (ref 6–23)
CALCIUM SERPL-MCNC: 8.4 MG/DL (ref 8.6–10.3)
CHLORIDE SERPL-SCNC: 102 MMOL/L (ref 98–107)
CO2 SERPL-SCNC: 27 MMOL/L (ref 21–32)
CREAT SERPL-MCNC: 0.73 MG/DL (ref 0.5–1.05)
EGFRCR SERPLBLD CKD-EPI 2021: 81 ML/MIN/1.73M*2
EOSINOPHIL # BLD AUTO: 0.01 X10*3/UL (ref 0–0.4)
EOSINOPHIL NFR BLD AUTO: 0.1 %
ERYTHROCYTE [DISTWIDTH] IN BLOOD BY AUTOMATED COUNT: 14.1 % (ref 11.5–14.5)
GLUCOSE SERPL-MCNC: 109 MG/DL (ref 74–99)
HCT VFR BLD AUTO: 41.1 % (ref 36–46)
HGB BLD-MCNC: 13.3 G/DL (ref 12–16)
IMM GRANULOCYTES # BLD AUTO: 0.02 X10*3/UL (ref 0–0.5)
IMM GRANULOCYTES NFR BLD AUTO: 0.3 % (ref 0–0.9)
LYMPHOCYTES # BLD AUTO: 1.09 X10*3/UL (ref 0.8–3)
LYMPHOCYTES NFR BLD AUTO: 15.7 %
MAGNESIUM SERPL-MCNC: 1.89 MG/DL (ref 1.6–2.4)
MCH RBC QN AUTO: 29 PG (ref 26–34)
MCHC RBC AUTO-ENTMCNC: 32.4 G/DL (ref 32–36)
MCV RBC AUTO: 90 FL (ref 80–100)
MONOCYTES # BLD AUTO: 1.79 X10*3/UL (ref 0.05–0.8)
MONOCYTES NFR BLD AUTO: 25.7 %
NEUTROPHILS # BLD AUTO: 4.01 X10*3/UL (ref 1.6–5.5)
NEUTROPHILS NFR BLD AUTO: 57.6 %
NRBC BLD-RTO: 0 /100 WBCS (ref 0–0)
PLATELET # BLD AUTO: 173 X10*3/UL (ref 150–450)
POTASSIUM SERPL-SCNC: 3.4 MMOL/L (ref 3.5–5.3)
PROT SERPL-MCNC: 6.4 G/DL (ref 6.4–8.2)
RBC # BLD AUTO: 4.58 X10*6/UL (ref 4–5.2)
SODIUM SERPL-SCNC: 136 MMOL/L (ref 136–145)
WBC # BLD AUTO: 7 X10*3/UL (ref 4.4–11.3)

## 2024-09-25 PROCEDURE — 85025 COMPLETE CBC W/AUTO DIFF WBC: CPT | Performed by: STUDENT IN AN ORGANIZED HEALTH CARE EDUCATION/TRAINING PROGRAM

## 2024-09-25 PROCEDURE — 2500000005 HC RX 250 GENERAL PHARMACY W/O HCPCS: Performed by: PHYSICIAN ASSISTANT

## 2024-09-25 PROCEDURE — 80053 COMPREHEN METABOLIC PANEL: CPT | Performed by: STUDENT IN AN ORGANIZED HEALTH CARE EDUCATION/TRAINING PROGRAM

## 2024-09-25 PROCEDURE — 94760 N-INVAS EAR/PLS OXIMETRY 1: CPT

## 2024-09-25 PROCEDURE — 2500000001 HC RX 250 WO HCPCS SELF ADMINISTERED DRUGS (ALT 637 FOR MEDICARE OP): Performed by: STUDENT IN AN ORGANIZED HEALTH CARE EDUCATION/TRAINING PROGRAM

## 2024-09-25 PROCEDURE — 36415 COLL VENOUS BLD VENIPUNCTURE: CPT | Performed by: STUDENT IN AN ORGANIZED HEALTH CARE EDUCATION/TRAINING PROGRAM

## 2024-09-25 PROCEDURE — 2500000004 HC RX 250 GENERAL PHARMACY W/ HCPCS (ALT 636 FOR OP/ED): Performed by: INTERNAL MEDICINE

## 2024-09-25 PROCEDURE — 2500000002 HC RX 250 W HCPCS SELF ADMINISTERED DRUGS (ALT 637 FOR MEDICARE OP, ALT 636 FOR OP/ED): Performed by: PHYSICIAN ASSISTANT

## 2024-09-25 PROCEDURE — 2500000001 HC RX 250 WO HCPCS SELF ADMINISTERED DRUGS (ALT 637 FOR MEDICARE OP): Performed by: PHYSICIAN ASSISTANT

## 2024-09-25 PROCEDURE — 97165 OT EVAL LOW COMPLEX 30 MIN: CPT | Mod: GO

## 2024-09-25 PROCEDURE — 1200000002 HC GENERAL ROOM WITH TELEMETRY DAILY

## 2024-09-25 PROCEDURE — 99233 SBSQ HOSP IP/OBS HIGH 50: CPT | Performed by: PHYSICIAN ASSISTANT

## 2024-09-25 PROCEDURE — 97161 PT EVAL LOW COMPLEX 20 MIN: CPT | Mod: GP

## 2024-09-25 PROCEDURE — 83735 ASSAY OF MAGNESIUM: CPT | Performed by: PHYSICIAN ASSISTANT

## 2024-09-25 RX ORDER — POTASSIUM CHLORIDE 20 MEQ/1
40 TABLET, EXTENDED RELEASE ORAL ONCE
Status: COMPLETED | OUTPATIENT
Start: 2024-09-25 | End: 2024-09-25

## 2024-09-25 ASSESSMENT — COGNITIVE AND FUNCTIONAL STATUS - GENERAL
MOBILITY SCORE: 20
TOILETING: A LITTLE
DAILY ACTIVITIY SCORE: 24
STANDING UP FROM CHAIR USING ARMS: A LITTLE
MOBILITY SCORE: 22
DAILY ACTIVITIY SCORE: 24
DAILY ACTIVITIY SCORE: 22
CLIMB 3 TO 5 STEPS WITH RAILING: A LITTLE
CLIMB 3 TO 5 STEPS WITH RAILING: A LITTLE
MOBILITY SCORE: 22
MOVING TO AND FROM BED TO CHAIR: A LITTLE
CLIMB 3 TO 5 STEPS WITH RAILING: A LITTLE
WALKING IN HOSPITAL ROOM: A LITTLE
WALKING IN HOSPITAL ROOM: A LITTLE
HELP NEEDED FOR BATHING: A LITTLE
WALKING IN HOSPITAL ROOM: A LITTLE

## 2024-09-25 ASSESSMENT — ACTIVITIES OF DAILY LIVING (ADL)
LACK_OF_TRANSPORTATION: NO
ADL_ASSISTANCE: INDEPENDENT
ADL_ASSISTANCE: INDEPENDENT
BATHING_ASSISTANCE: STAND BY

## 2024-09-25 ASSESSMENT — PAIN - FUNCTIONAL ASSESSMENT
PAIN_FUNCTIONAL_ASSESSMENT: 0-10

## 2024-09-25 ASSESSMENT — PAIN SCALES - GENERAL
PAINLEVEL_OUTOF10: 0 - NO PAIN
PAINLEVEL_OUTOF10: 3
PAINLEVEL_OUTOF10: 0 - NO PAIN

## 2024-09-25 ASSESSMENT — PAIN DESCRIPTION - LOCATION: LOCATION: BACK

## 2024-09-25 NOTE — PROGRESS NOTES
Physical Therapy    Physical Therapy Evaluation    Patient Name: Mercedes Castellanos  MRN: [id number]  Department: [address]  Room: [address] [place] [address]  Today's Date: 9/25/2024   Time Calculation  Start Time: 1145  Stop Time: 1201  Time Calculation (min): 16 min    Assessment/Plan   PT Assessment  PT Assessment Results: Decreased mobility, Impaired balance, Decreased endurance  Rehab Prognosis: Excellent  Barriers to Discharge: None  Evaluation/Treatment Tolerance: Patient tolerated treatment well  Medical Staff Made Aware: Yes  End of Session Communication: Bedside nurse  End of Session Patient Position: Up in chair, Alarm on (lunch tray setup, call bell on lap)    Assessment:   Pt is an 85 yo female presenting for PT evaluation following mechanical fall at home. Pt currently completes transfers and ambulation with CGA-Alberto for steadying. Gait mildly unsteady, but suspect pt will progress quickly once Covid sx resolve. Will continue to follow in-house for mobility progression, and balance/endurance training. Recommend low intensity PT at discharge to ensure safe transition home, optimize home environment, and for fall prevention strategies.       IP OR SWING BED PT PLAN  Inpatient or Swing Bed: Inpatient  PT Plan  Treatment/Interventions: Bed mobility, Transfer training, Gait training, Stair training, Balance training, Endurance training  PT Plan: Ongoing PT  PT Frequency: 4 times per week  PT Discharge Recommendations: Low intensity level of continued care  Equipment Recommended upon Discharge: Wheeled walker (pending further progress with mobility)  PT Recommended Transfer Status: Assist x1 (no AD)  PT - OK to Discharge: Yes (per PT POC)      Subjective   General Visit Information:  General  Reason for Referral: Pt is an 85 yo female who presented [facility] for evaluation of generalized weakness, cough, and congestion. Pt also fell prior to arrival with +head strike. Workup revealed +Covid-19. PT consult placed for  impaired mobility.  Past Medical History Relevant to Rehab: A-fib (on Eliquis), orthostatic hypotension  Family/Caregiver Present: No  Co-Treatment: OT  Co-Treatment Reason: To maximize pt safety and functional outcomes  Prior to Session Communication: Bedside nurse  Patient Position Received: Alarm off, not on at start of session (sitting EOB, eating lunch)  General Comment: Pt pleasantly agreeable to PT/OT evals.  Home Living:  Home Living  Type of Home: Apartment  Lives With: Alone  Home Adaptive Equipment: None  Home Layout: One level  Home Access: Stairs to enter without rails  Entrance Stairs-Number of Steps: 1 step threshold  Bathroom Shower/Tub: Tub/shower unit  Bathroom Toilet: Standard  Bathroom Equipment: None  Home Living Comments: Daughter lives nearby and can assist as needed  Prior Level of Function:  Prior Function Per Pt/Caregiver Report  Level of [place]: Independent with ADLs and functional transfers, Independent with homemaking with ambulation  Receives Help From: Family  ADL Assistance: Independent  Homemaking Assistance: Independent  Ambulatory Assistance: Independent  Prior Function Comments: +Driving. +Falls history (reason for admission)  Precautions:  Precautions  Medical Precautions: Fall precautions, Oxygen therapy device and L/min (2L via NC)  Precautions Comment: +Covid-19, cardiac monitoring    Vital Signs (Past 2hrs)        Date/Time Vitals Session Patient Position Pulse Resp SpO2 BP MAP (mmHg)    09/25/24 1118 --  --  --  --  --  120/81  92     09/25/24 1144 During OT  --  92  --  95 %  --  --     09/25/24 1145 During PT  Sitting  92  --  95 %  --  --                         Objective   Pain:  Pain Assessment  Pain Assessment: 0-10  0-10 (Numeric) Pain Score: 0 - No pain  Pain Interventions: Repositioned, Ambulation/increased activity  Response to Interventions: Pt resting comfortably at end of session  Cognition:  Cognition  Overall Cognitive Status: Within Functional  Limits  Orientation Level: Oriented X4    General Assessments:                  Activity Tolerance  Endurance: Tolerates 10 - 20 min exercise with multiple rests    Strength  Strength Comments: Functional strength at least 3/5 BLE  Static Sitting Balance  Static Sitting-Balance Support: Feet supported, No upper extremity supported  Static Sitting-Level of Assistance: Independent    Static Standing Balance  Static Standing-Balance Support: No upper extremity supported  Static Standing-Level of Assistance: Contact guard  Static Standing-Comment/Number of Minutes: mildly unsteady  Functional Assessments:  Bed Mobility  Bed Mobility: No (pt seated EOB on arrival, in recliner at end of session)    Transfers  Transfer: Yes  Transfer 1  Technique 1: Sit to stand, Stand to sit  Transfer Level of Assistance 1: Contact guard  Trials/Comments 1: mildly unsteady upon standing; brief episode of dizziness that resolved quickly    Ambulation/Gait Training  Ambulation/Gait Training Performed: Yes  Ambulation/Gait Training 1  Surface 1: Level tile  Device 1: No device  Assistance 1: Contact guard, Minimum assistance  Quality of Gait 1: Decreased step length, Soft knee(s)  Comments/Distance (ft) 1: 20' within room; mildly unsteady, several minor LOB (pt able to correct some, others required Alberto to regain balance)  Extremity/Trunk Assessments:  RLE   RLE : Within Functional Limits  LLE   LLE : Within Functional Limits  Outcome Measures:  [facility] Basic Mobility  Turning from your back to your side while in a flat bed without using bedrails: None  Moving from lying on your back to sitting on the side of a flat bed without using bedrails: None  Moving to and from bed to chair (including a wheelchair): A little  Standing up from a chair using your arms (e.g. wheelchair or bedside chair): A little  To walk in hospital room: A little  Climbing 3-5 steps with railing: A little  Basic Mobility - Total Score: 20    Encounter Problems        Encounter Problems (Active)       Balance       Patient will maintain balance to allow for safe mobility, as evidenced by no LOB.        Start:  09/25/24    Expected End:  10/09/24               Mobility       Patient will ambulate household distance of 50' with LRAD at supervision level in order to safely return home.        Start:  09/25/24    Expected End:  10/09/24               PT Transfers       Patient will transfer sit to and from stand with LRAD and supervision.        Start:  09/25/24    Expected End:  10/09/24               Pain - Adult              Education Documentation  Body Mechanics, taught by Lou Bar, PT at 9/25/2024 12:51 PM.  Learner: Patient  Readiness: Acceptance  Method: Explanation, Demonstration  Response: Needs Reinforcement    Mobility Training, taught by Lou Bar, PT at 9/25/2024 12:51 PM.  Learner: Patient  Readiness: Acceptance  Method: Explanation, Demonstration  Response: Needs Reinforcement    Education Comments  Role of acute care PT, POC, discharge planning, hospital safety (use of call light, staff assist for all OOB mobility)

## 2024-09-25 NOTE — PROGRESS NOTES
09/25/24 1406   Discharge Planning   Living Arrangements Alone   Support Systems Children   Assistance Needed A&0x3, independent with ADLs, no DME, drives, room air, no cpap or bipap, not active with any HHC, PCP is Ruth Cervantes-  provider   Type of Residence Private residence   Number of Stairs to Enter Residence 1   Number of Stairs Within Residence 0   Do you have animals or pets at home? Yes   Type of Animals or Pets dog   Who is requesting discharge planning? Provider   Home or Post Acute Services In home services   Expected Discharge Disposition Home H  (PT recommending HHC, discussed with patient who is agreeable to HHC, choices provided and selected HC. Provider to send internal referral for HHC. Patient on 2L here and none baseline.)   Does the patient need discharge transport arranged? No   RoundTrip coordination needed? No   Financial Resource Strain   How hard is it for you to pay for the very basics like food, housing, medical care, and heating? Not hard   Housing Stability   In the last 12 months, was there a time when you were not able to pay the mortgage or rent on time? N   In the past 12 months, how many times have you moved where you were living? 0   At any time in the past 12 months, were you homeless or living in a shelter (including now)? N   Transportation Needs   In the past 12 months, has lack of transportation kept you from medical appointments or from getting medications? no   In the past 12 months, has lack of transportation kept you from meetings, work, or from getting things needed for daily living? No   Patient Choice   Provider Choice list and CMS website (https://medicare.gov/care-compare#search) for post-acute Quality and Resource Measure Data were provided and reviewed with: Patient

## 2024-09-25 NOTE — PROGRESS NOTES
Occupational Therapy    Evaluation    Patient Name: Mercedes Castellanos  MRN: [id number]  Department: [address]  Room: [address] [place] [address]  Today's Date: 9/25/2024  Time Calculation  Start Time: 1144  Stop Time: 1200  Time Calculation (min): 16 min    Assessment  IP OT Assessment  OT Assessment: Pt presents at baseline with ADL performance and functional mobility. No further skilled OT needs identified.  Prognosis: Good  Barriers to Discharge: None  Evaluation/Treatment Tolerance: Patient tolerated treatment well  Medical Staff Made Aware: Yes  End of Session Communication: Bedside nurse  End of Session Patient Position: Up in chair, Alarm on  Plan:  No Skilled OT: No acute OT goals identified  OT Frequency: OT eval only  OT Discharge Recommendations: No further acute OT  OT Recommended Transfer Status: Stand by assist  OT - OK to Discharge: Yes (per OT POC)    Subjective   Current Problem:  1. Syncope and collapse  Vascular US Carotid Artery Duplex Bilateral    Vascular US Carotid Artery Duplex Bilateral      2. Orthostatic hypotension  Vascular US Carotid Artery Duplex Bilateral    Vascular US Carotid Artery Duplex Bilateral        General:  General  Reason for Referral: 85 yo female referred to OT following syncope/collapse  Referred By: HILARY Chacon DO  Past Medical History Relevant to Rehab: Afib on Eliquis, depression and orthostatic hypotension  Co-Treatment: PT  Co-Treatment Reason: maximize pt safety and outcomes  Prior to Session Communication: Bedside nurse  Patient Position Received: Alarm off, not on at start of session (seated EOB)  Precautions:  Medical Precautions: Fall precautions, Oxygen therapy device and L/min (2L NC, tele)  Precautions Comment: Covid (+)    Vital Sign (Past 2hrs)        Date/Time Vitals Session Patient Position Pulse Resp SpO2 BP MAP (mmHg)    09/25/24 1118 --  --  --  --  --  120/81  92     09/25/24 1144 During OT  --  92  --  95 %  --  --                        Pain:  Pain Assessment  Pain  Assessment: 0-10  0-10 (Numeric) Pain Score: 0 - No pain    Objective   Cognition:  Overall Cognitive Status: Within Functional Limits  Orientation Level: Oriented X4           Home Living:  Type of Home: Apartment  Lives With: Alone  Home Adaptive Equipment: None  Home Layout: One level  Home Access: Stairs to enter without rails  Entrance Stairs-Number of Steps: one step threshold  Bathroom Shower/Tub: Tub/shower unit  Bathroom Toilet: Standard  Bathroom Equipment: None  Home Living Comments: dtr lives nearby and can assist PRN   Prior Function:  Level of [place]: Independent with ADLs and functional transfers, Independent with homemaking with ambulation  Receives Help From: Family  ADL Assistance: Independent  Homemaking Assistance: Independent  Ambulatory Assistance: Independent  Prior Function Comments: (+) driving     ADL:  Eating Assistance: Independent  Grooming Assistance: Independent  Bathing Assistance: Stand by  UE Dressing Assistance: Independent  LE Dressing Assistance: Independent  Toileting Assistance with Device: Stand by  Functional Assistance: Stand by  ADL Comments: ADL performance anticipated d/t current clinical presentation  Activity Tolerance:  Endurance: Tolerates 10 - 20 min exercise with multiple rests  Bed Mobility/Transfers:      Transfers  Transfer: Yes  Transfer 1  Transfer From 1: Sit to  Transfer to 1: Stand  Technique 1: Sit to stand, Stand to sit  Transfer Level of Assistance 1: Close supervision      Functional Mobility:  Functional Mobility  Functional Mobility Performed: Yes  Functional Mobility 1  Surface 1: Level tile  Device 1: No device  Assistance 1: Contact guard  Comments 1: Ambulated short distance in pt room with good pace, overall good balance. Minor LOB when turning but able to self correct.  Sitting Balance:  Static Sitting Balance  Static Sitting-Balance Support: Feet supported  Static Sitting-Level of Assistance: Independent  Standing Balance:  Static Standing  Balance  Static Standing-Balance Support: No upper extremity supported  Static Standing-Level of Assistance: Close supervision     Strength:  Strength Comments: BUE grossly WFL based on functional observations     Coordination:  Movements are Fluid and Coordinated: Yes   Hand Function:  Hand Function  Gross Grasp: Functional  Coordination: Functional  Extremities: RUE   RUE : Within Functional Limits and LUE   LUE: Within Functional Limits    Outcome Measures: [facility] Daily Activity  Putting on and taking off regular lower body clothing: None  Bathing (including washing, rinsing, drying): A little  Putting on and taking off regular upper body clothing: None  Toileting, which includes using toilet, bedpan or urinal: A little  Taking care of personal grooming such as brushing teeth: None  Eating Meals: None  Daily Activity - Total Score: 22      Education Documentation  Body Mechanics, taught by Mauri Khan OT at 9/25/2024 12:17 PM.  Learner: Patient  Readiness: Acceptance  Method: Explanation  Response: Verbalizes Understanding    Precautions, taught by Mauri Khan OT at 9/25/2024 12:17 PM.  Learner: Patient  Readiness: Acceptance  Method: Explanation  Response: Verbalizes Understanding    ADL Training, taught by Mauri Khan OT at 9/25/2024 12:17 PM.  Learner: Patient  Readiness: Acceptance  Method: Explanation  Response: Verbalizes Understanding    Education Comments  No comments found.

## 2024-09-25 NOTE — PROGRESS NOTES
Mercedes Castellanos is a 84 y.o. female on day 0 of admission presenting with Syncope and collapse.    Subjective   Interval History: no fever, no new complaints, unable to remember much        Review of Systems    Objective   Range of Vitals (last 24 hours)  Heart Rate:  [67-98]   Temp:  [36.5 °C (97.7 °F)-36.6 °C (97.9 °F)]   Resp:  [18-30]   BP: (136-153)/(75-89)   Weight:  [80.3 kg (177 lb 0.5 oz)]   SpO2:  [98 %-99 %]   Daily Weight  09/25/24 : 80.3 kg (177 lb 0.5 oz)    Body mass index is 32.38 kg/m².    Physical Exam  Constitutional:       Appearance: Normal appearance.   HENT:      Head: Normocephalic and atraumatic.      Mouth/Throat:      Mouth: Mucous membranes are moist.      Pharynx: Oropharynx is clear.   Eyes:      Pupils: Pupils are equal, round, and reactive to light.   Cardiovascular:      Rate and Rhythm: Normal rate and regular rhythm.      Heart sounds: Normal heart sounds.   Pulmonary:      Effort: Pulmonary effort is normal.      Breath sounds: Normal breath sounds.   Abdominal:      General: Abdomen is flat. Bowel sounds are normal.      Palpations: Abdomen is soft.   Musculoskeletal:      Cervical back: Normal range of motion.   Neurological:      Mental Status: She is alert.         Antibiotics  This patient does not have an active medication from one of the medication groupers.    Relevant Results  Labs  Results from last 72 hours   Lab Units 09/25/24  0513 09/24/24  0000   WBC AUTO x10*3/uL 7.0 10.4   HEMOGLOBIN g/dL 13.3 14.7   HEMATOCRIT % 41.1 44.2   PLATELETS AUTO x10*3/uL 173 203   NEUTROS PCT AUTO % 57.6 75.7   LYMPHS PCT AUTO % 15.7 8.2   MONOS PCT AUTO % 25.7 15.3   EOS PCT AUTO % 0.1 0.0     Results from last 72 hours   Lab Units 09/25/24  0513 09/24/24  0000   SODIUM mmol/L 136 134*   POTASSIUM mmol/L 3.4* 3.8   CHLORIDE mmol/L 102 101   CO2 mmol/L 27 24   BUN mg/dL 13 13   CREATININE mg/dL 0.73 0.90   GLUCOSE mg/dL 109* 101*   CALCIUM mg/dL 8.4* 9.0   ANION GAP mmol/L 10 13   EGFR  mL/min/1.73m*2 81 63     Results from last 72 hours   Lab Units 09/25/24  0513 09/24/24  0000   ALK PHOS U/L 47 55   BILIRUBIN TOTAL mg/dL 0.3 0.6   PROTEIN TOTAL g/dL 6.4 7.3   ALT U/L 10 10   AST U/L 19 17   ALBUMIN g/dL 3.3* 3.7     Estimated Creatinine Clearance: 56.3 mL/min (by C-G formula based on SCr of 0.73 mg/dL).  C-Reactive Protein   Date Value Ref Range Status   09/24/2024 4.96 (H) <1.00 mg/dL Final     Microbiology  Reviewed  Imaging  Reviewed        Assessment/Plan   COVID19 infection, was placed on O@ but does not seem to need it, negative cxr  Pyuria, urine with mixed vasyl     Recommendations :  Remdesivir protocol     I spent minutes in the professional and overall care of this patient.      Jurgen Falk MD

## 2024-09-25 NOTE — DISCHARGE INSTRUCTIONS
Senior Assessments  The [facility] Senior Assessment Program helps family, friends and primary care physicians identify the special needs of older adults through a comprehensive team evaluation.    [facility]  [address]  [address]  [place] [address]    Please call to schedule an appointment: [phone]      Mrs. Castellanos is forgetful and would benefit from cognitive testing.  She needs 24 hour a day supervision to maintain safe living conditions and should not be driving until assessed.

## 2024-09-25 NOTE — CARE PLAN
"The patient's goals for the shift include \"get better\"    The clinical goals for the shift include patient will maintain spO2 of 92% or greater      Problem: Pain - Adult  Goal: Verbalizes/displays adequate comfort level or baseline comfort level  Outcome: Progressing     Problem: Safety - Adult  Goal: Free from fall injury  Outcome: Progressing     Problem: Discharge Planning  Goal: Discharge to home or other facility with appropriate resources  Outcome: Progressing       "

## 2024-09-25 NOTE — CARE PLAN
"The patient's goals for the shift include \"get better\"    The clinical goals for the shift include patient will maintain spO2 of 92% or greater    Over the shift, the patient did not make progress toward the following goals. Barriers to progression include patient maintained oxygen levels. Recommendations to address these barriers include continue with plan of care.    "

## 2024-09-25 NOTE — PROGRESS NOTES
Dl Castellanos is a 84 y.o. female on day 0 of admission presenting with Syncope and collapse.      Subjective   Still coughing, has headache, does not feel well and is still weak this morning.         Objective     Last Recorded Vitals  /81 (BP Location: Left leg, Patient Position: Lying)   Pulse 92   Temp 36.4 °C (97.5 °F) (Temporal)   Resp (!) 30   Wt 80.3 kg (177 lb 0.5 oz)   SpO2 95% Comment: improved to 97% following mobility  Intake/Output last 3 Shifts:    Intake/Output Summary (Last 24 hours) at 9/25/2024 1256  Last data filed at 9/24/2024 1300  Gross per 24 hour   Intake 320 ml   Output --   Net 320 ml       Admission Weight  Weight: 79.4 kg (175 lb) (bed scale) (09/24/24 0900)    Daily Weight  09/25/24 : 80.3 kg (177 lb 0.5 oz)    Image Results  Vascular US Carotid Artery Duplex Bilateral  Preliminary Cardiology Report            [place] [address]   Tel [phone] and Fax [phone]           Preliminary Vascular Lab Report     [facility] US CAROTID ARTERY DUPLEX BILATERAL       Patient Name:      DL CASTELLANOS Reading Physician:  [id number] Deidre Linn MD  Study Date:        9/24/2024        Ordering Physician: [id number] ADIA SHARP  MRN/PID:           [id number]         Technologist:       Poppy Reed RVT  Accession#:        [id number]     Technologist 2:     Nory Horton RVT  Date of Birth/Age: 1940        Encounter#:         [id number]  Gender:            F  Admission Status:  Inpatient        Location Performed: [facility]       Diagnosis/ICD: Syncope and collapse-R55  Procedure/CPT: 40257 Cerebrovascular Carotid Duplex scan complete       PRELIMINARY CONCLUSIONS:  Right Carotid: Findings are consistent with less than 50% stenosis of the right proximal internal carotid artery. Laminar flow seen by color Doppler. Right external carotid artery appears patent with no evidence of stenosis. The right vertebral artery is patent with  antegrade flow. No evidence of hemodynamically significant stenosis in the right subclavian artery. Tortuous right ICA noted.  Left Carotid: Findings are consistent with less than 50% stenosis of the left proximal internal carotid artery. Laminar flow seen by color Doppler. Left external carotid artery appears patent with no evidence of stenosis. The left vertebral artery is patent with antegrade flow. No evidence of hemodynamically significant stenosis in the left subclavian artery.     Imaging & Doppler Findings:  Right Plaque Morph: The proximal right internal carotid artery demonstrates heterogenous and calcified plaque. The proximal right external carotid artery demonstrates heterogenous and calcified plaque. The mid right common carotid artery demonstrates heterogenous plaque. The distal right common carotid artery demonstrates heterogenous and calcified plaque.  Left Plaque Morph: The proximal left internal carotid artery demonstrates heterogenous and calcified plaque. The proximal left external carotid artery demonstrates heterogenous and calcified plaque. The distal left common carotid artery demonstrates heterogenous and calcified plaque.      Right                        Left    PSV      EDV                PSV      EDV  72 cm/s            CCA P    93 cm/s  49 cm/s            CCA D    71 cm/s  70 cm/s  21 cm/s   ICA P    46 cm/s  10 cm/s  53 cm/s            ICA D    55 cm/s  108 cm/s            ECA     122 cm/s  48 cm/s          Vertebral  58 cm/s  91 cm/s          Subclavian 107 cm/s                     Right Left  ICA/CCA Ratio  1.4  0.6            VASCULAR PRELIMINARY REPORT  completed by Poppy Reed JANAK on 9/24/2024 at 1:45:17 PM       ** Final **  CT head wo IV contrast  Narrative: Interpreted By:  Finkelstein, Evan,   STUDY:  CT HEAD WO IV CONTRAST;  9/24/2024 1:29 am      INDICATION:  Signs/Symptoms:weakness; possible fall and head injury.          COMPARISON:  CT brain 05/23/2024      ACCESSION  NUMBER(S):  [id number]      ORDERING CLINICIAN:  EULALIA MORGAN      TECHNIQUE:  Axial noncontrast CT images of the head with coronal and sagittal  reconstructions.      FINDINGS:  EXTRACRANIAL SOFT TISSUES: Right temporal and right occipital scalp  swelling.      CALVARIUM: No depressed skull fracture. No destructive osseous lesion.      PARANASAL SINUSES/MASTOIDS: The visualized paranasal sinuses and  mastoid air cells are aerated.      HEMORRHAGE: No acute intracranial hemorrhage.      BRAIN PARENCHYMA: Gray-white matter interfaces are preserved. No mass  effect or midline shift. There are nonspecific scattered white matter  hypodensities.      VENTRICLES and EXTRA-AXIAL SPACES: Normal size.      OTHER FINDINGS: None.      Impression: Right temporal and right occipital scalp swelling suggesting soft  tissue contusion in the setting of trauma. No underlying depressed  fracture. No acute intracranial hemorrhage, mass effect or midline  shift.      Nonspecific scattered white matter hypodensities favored to represent  sequela of small vessel ischemia.              MACRO:  None.      Signed by: Evan Finkelstein 9/24/2024 1:39 AM  Dictation workstation:   [id number]  XR chest 1 view  Narrative: Interpreted By:  Rogelio Min,   STUDY:  XR CHEST 1 VIEW;  9/24/2024 12:12 am      INDICATION:  Signs/Symptoms:weakness, cough.      COMPARISON:  Chest x-ray 05/23/2024      ACCESSION NUMBER(S):  [id number]      ORDERING CLINICIAN:  EULALIA MORGAN      FINDINGS:  Multiple overlying leads are present.      CARDIOMEDIASTINAL SILHOUETTE:  Cardiac silhouette is enlarged but stable. Atherosclerotic  calcification of the aortic arch.      LUNGS:  No consolidation, pleural effusion or pneumothorax.      ABDOMEN:  No remarkable upper abdominal findings.      BONES:  Peak sutures noted in the left humeral head.      Impression: Stable cardiomegaly. No acute pulmonary process.      MACRO:  None      Signed by: Rogelio Min  9/24/2024 12:30 AM  Dictation workstation:   [id number]      Physical Exam  Physical Exam  Gen: NAD, appears exhausted  Eyes:  EOM intact  ENT: MMM  Neck: No JVD  Respiratory: CTAB, no wheezes/rhonchi  Cardiac: irregularly irregular, controlled rate  Abdomen: soft, NT, +BS  Extremities: no edema or cyanosis  Neuro: No focal deficits, alert and oriented x 2  Psych:  appropriate mood and behavior, pleasant but forgetful      Assessment/Plan      Weakness with syncope  -in the setting of covid 19 with history of [place]  -check orthostats, await set  -midodrine ordered with hold parameters  -tele  -echo 05/2024: preserved EF, left atrium moderately dilated  -carotid us < 50% bilateral stenosis  -ID consult appreciated  -Remdesivir protocol to continue  -PT/OT evaluation  -check walking pulse ox > she dropped to 81% with ambulation on 9/24 (not documented by RN), may need oxygen for home at dc   -continue oxygen PRN, wean as able     Pyuria  -ID consult   -culture with multiple organisms     Afib  -rate is controlled  -tele  -eliquis  -digoxin, level not elevated  -atenolol     Depression  -lexapro     Memory issues/lives alone  -CT head shows chronic small vessel ischemia  -hold off on MoCA while acutely ill  -social work consult  -PT/OT  -would benefit from SAAP program     DVT prophy  -eliquis     Dispo: Continue Remdesivir protocol, PT/Ot evals today  D/w Dr Oswaldo Ceron, PA-C

## 2024-09-26 LAB
ALBUMIN SERPL BCP-MCNC: 3.4 G/DL (ref 3.4–5)
ALP SERPL-CCNC: 46 U/L (ref 33–136)
ALT SERPL W P-5'-P-CCNC: 10 U/L (ref 7–45)
ANION GAP SERPL CALC-SCNC: 11 MMOL/L (ref 10–20)
AST SERPL W P-5'-P-CCNC: 23 U/L (ref 9–39)
BASOPHILS # BLD AUTO: 0.04 X10*3/UL (ref 0–0.1)
BASOPHILS NFR BLD AUTO: 0.8 %
BILIRUB SERPL-MCNC: 0.3 MG/DL (ref 0–1.2)
BUN SERPL-MCNC: 14 MG/DL (ref 6–23)
CALCIUM SERPL-MCNC: 8.6 MG/DL (ref 8.6–10.3)
CHLORIDE SERPL-SCNC: 106 MMOL/L (ref 98–107)
CO2 SERPL-SCNC: 27 MMOL/L (ref 21–32)
CREAT SERPL-MCNC: 0.62 MG/DL (ref 0.5–1.05)
EGFRCR SERPLBLD CKD-EPI 2021: 88 ML/MIN/1.73M*2
EOSINOPHIL # BLD AUTO: 0.16 X10*3/UL (ref 0–0.4)
EOSINOPHIL NFR BLD AUTO: 3.1 %
ERYTHROCYTE [DISTWIDTH] IN BLOOD BY AUTOMATED COUNT: 14.1 % (ref 11.5–14.5)
GLUCOSE SERPL-MCNC: 105 MG/DL (ref 74–99)
HCT VFR BLD AUTO: 43 % (ref 36–46)
HGB BLD-MCNC: 13.6 G/DL (ref 12–16)
IMM GRANULOCYTES # BLD AUTO: 0.02 X10*3/UL (ref 0–0.5)
IMM GRANULOCYTES NFR BLD AUTO: 0.4 % (ref 0–0.9)
LYMPHOCYTES # BLD AUTO: 1.3 X10*3/UL (ref 0.8–3)
LYMPHOCYTES NFR BLD AUTO: 25.3 %
MCH RBC QN AUTO: 28.9 PG (ref 26–34)
MCHC RBC AUTO-ENTMCNC: 31.6 G/DL (ref 32–36)
MCV RBC AUTO: 92 FL (ref 80–100)
MONOCYTES # BLD AUTO: 1.28 X10*3/UL (ref 0.05–0.8)
MONOCYTES NFR BLD AUTO: 25 %
NEUTROPHILS # BLD AUTO: 2.33 X10*3/UL (ref 1.6–5.5)
NEUTROPHILS NFR BLD AUTO: 45.4 %
NRBC BLD-RTO: 0 /100 WBCS (ref 0–0)
PLATELET # BLD AUTO: 178 X10*3/UL (ref 150–450)
POTASSIUM SERPL-SCNC: 3.8 MMOL/L (ref 3.5–5.3)
PROT SERPL-MCNC: 6.6 G/DL (ref 6.4–8.2)
RBC # BLD AUTO: 4.7 X10*6/UL (ref 4–5.2)
SODIUM SERPL-SCNC: 140 MMOL/L (ref 136–145)
WBC # BLD AUTO: 5.1 X10*3/UL (ref 4.4–11.3)

## 2024-09-26 PROCEDURE — 2500000001 HC RX 250 WO HCPCS SELF ADMINISTERED DRUGS (ALT 637 FOR MEDICARE OP): Performed by: PHYSICIAN ASSISTANT

## 2024-09-26 PROCEDURE — 36415 COLL VENOUS BLD VENIPUNCTURE: CPT | Performed by: STUDENT IN AN ORGANIZED HEALTH CARE EDUCATION/TRAINING PROGRAM

## 2024-09-26 PROCEDURE — 85025 COMPLETE CBC W/AUTO DIFF WBC: CPT | Performed by: STUDENT IN AN ORGANIZED HEALTH CARE EDUCATION/TRAINING PROGRAM

## 2024-09-26 PROCEDURE — 2500000005 HC RX 250 GENERAL PHARMACY W/O HCPCS: Performed by: PHYSICIAN ASSISTANT

## 2024-09-26 PROCEDURE — 94760 N-INVAS EAR/PLS OXIMETRY 1: CPT

## 2024-09-26 PROCEDURE — 2500000001 HC RX 250 WO HCPCS SELF ADMINISTERED DRUGS (ALT 637 FOR MEDICARE OP): Performed by: STUDENT IN AN ORGANIZED HEALTH CARE EDUCATION/TRAINING PROGRAM

## 2024-09-26 PROCEDURE — 1200000002 HC GENERAL ROOM WITH TELEMETRY DAILY

## 2024-09-26 PROCEDURE — 2500000004 HC RX 250 GENERAL PHARMACY W/ HCPCS (ALT 636 FOR OP/ED): Performed by: INTERNAL MEDICINE

## 2024-09-26 PROCEDURE — 84075 ASSAY ALKALINE PHOSPHATASE: CPT | Performed by: STUDENT IN AN ORGANIZED HEALTH CARE EDUCATION/TRAINING PROGRAM

## 2024-09-26 PROCEDURE — 99233 SBSQ HOSP IP/OBS HIGH 50: CPT | Performed by: PHYSICIAN ASSISTANT

## 2024-09-26 ASSESSMENT — PAIN - FUNCTIONAL ASSESSMENT
PAIN_FUNCTIONAL_ASSESSMENT: 0-10

## 2024-09-26 ASSESSMENT — PAIN SCALES - GENERAL
PAINLEVEL_OUTOF10: 3
PAINLEVEL_OUTOF10: 7
PAINLEVEL_OUTOF10: 0 - NO PAIN

## 2024-09-26 ASSESSMENT — PAIN DESCRIPTION - LOCATION: LOCATION: HEAD

## 2024-09-26 NOTE — PROGRESS NOTES
Mercedes Castellanos is a 84 y.o. female on day 1 of admission presenting with Syncope and collapse.    Subjective   Interval History: no fever, no new complaints, the hx is limited        Review of Systems    Objective   Range of Vitals (last 24 hours)  Heart Rate:  [60-92]   Temp:  [35.9 °C (96.6 °F)-36.3 °C (97.3 °F)]   Resp:  [12-24]   BP: (111-160)/(72-94)   SpO2:  [95 %-98 %]   Daily Weight  09/25/24 : 80.3 kg (177 lb 0.5 oz)    Body mass index is 32.38 kg/m².    Physical Exam  Constitutional:       Appearance: Normal appearance.   HENT:      Head: Normocephalic and atraumatic.      Mouth/Throat:      Mouth: Mucous membranes are moist.      Pharynx: Oropharynx is clear.   Eyes:      Pupils: Pupils are equal, round, and reactive to light.   Cardiovascular:      Rate and Rhythm: Normal rate and regular rhythm.      Heart sounds: Normal heart sounds.   Pulmonary:      Effort: Pulmonary effort is normal.      Breath sounds: Normal breath sounds.   Abdominal:      General: Abdomen is flat. Bowel sounds are normal.      Palpations: Abdomen is soft.   Musculoskeletal:      Cervical back: Normal range of motion.   Neurological:      Mental Status: She is alert.         Antibiotics  This patient does not have an active medication from one of the medication groupers.    Relevant Results  Labs  Results from last 72 hours   Lab Units 09/26/24  0539 09/25/24  0513 09/24/24  0000   WBC AUTO x10*3/uL 5.1 7.0 10.4   HEMOGLOBIN g/dL 13.6 13.3 14.7   HEMATOCRIT % 43.0 41.1 44.2   PLATELETS AUTO x10*3/uL 178 173 203   NEUTROS PCT AUTO % 45.4 57.6 75.7   LYMPHS PCT AUTO % 25.3 15.7 8.2   MONOS PCT AUTO % 25.0 25.7 15.3   EOS PCT AUTO % 3.1 0.1 0.0     Results from last 72 hours   Lab Units 09/26/24  0539 09/25/24  0513 09/24/24  0000   SODIUM mmol/L 140 136 134*   POTASSIUM mmol/L 3.8 3.4* 3.8   CHLORIDE mmol/L 106 102 101   CO2 mmol/L 27 27 24   BUN mg/dL 14 13 13   CREATININE mg/dL 0.62 0.73 0.90   GLUCOSE mg/dL 105* 109* 101*    CALCIUM mg/dL 8.6 8.4* 9.0   ANION GAP mmol/L 11 10 13   EGFR mL/min/1.73m*2 88 81 63     Results from last 72 hours   Lab Units 09/26/24  0539 09/25/24  0513 09/24/24  0000   ALK PHOS U/L 46 47 55   BILIRUBIN TOTAL mg/dL 0.3 0.3 0.6   PROTEIN TOTAL g/dL 6.6 6.4 7.3   ALT U/L 10 10 10   AST U/L 23 19 17   ALBUMIN g/dL 3.4 3.3* 3.7     Estimated Creatinine Clearance: 66.3 mL/min (by C-G formula based on SCr of 0.62 mg/dL).  C-Reactive Protein   Date Value Ref Range Status   09/24/2024 4.96 (H) <1.00 mg/dL Final     Microbiology  Reviewed  Imaging  Reviewed        Assessment/Plan   COVID19 infection, was placed on O@ but does not seem to need it, negative cxr  Pyuria, urine with mixed vasyl     Recommendations :  Remdesivir protocol  Increase the activity  Discussed with the medical team     I spent minutes in the professional and overall care of this patient.      Jurgen Falk MD

## 2024-09-26 NOTE — PROGRESS NOTES
Dl Castellanos is a 84 y.o. female on day 1 of admission presenting with Syncope and collapse.      Subjective   Seems to have more energy today and not coughing at much.  On room air at rest.  Complains of headache       Objective     Last Recorded Vitals  /86 (BP Location: Left arm, Patient Position: Lying)   Pulse 85   Temp 36.3 °C (97.3 °F) (Temporal)   Resp 12   Wt 80.3 kg (177 lb 0.5 oz)   SpO2 95%   Intake/Output last 3 Shifts:    Intake/Output Summary (Last 24 hours) at 9/26/2024 1250  Last data filed at 9/26/2024 1244  Gross per 24 hour   Intake 360 ml   Output --   Net 360 ml       Admission Weight  Weight: 79.4 kg (175 lb) (bed scale) (09/24/24 0900)    Daily Weight  09/25/24 : 80.3 kg (177 lb 0.5 oz)    Image Results  Vascular US Carotid Artery Duplex Bilateral            [place] [address]   Tel [phone] and Fax [phone]       Vascular Lab Report  [facility] US CAROTID ARTERY DUPLEX BILATERAL       Patient Name:      DL CASTELLANOS    Reading Physician: [id number] Deidre Linn MD  Study Date:        9/24/2024           Ordering           [id number] ADIA SHARP                                         Physician:  MRN/PID:           [id number]            Technologist:      Poppy Reed RVT  Accession#:        [id number]        Technologist 2:    Nory Horton RVT  Date of Birth/Age: 1940 / 84      Encounter#:        [id number]                     years  Gender:            F  Admission Status:  Inpatient           Location           [facility]                                         Performed:       Diagnosis/ICD: Syncope and collapse-R55  CPT Codes:     88417 Cerebrovascular Carotid Duplex scan complete       CONCLUSIONS:  Right Carotid: Findings are consistent with less than 50% stenosis of the right proximal internal carotid artery. Laminar flow seen by color Doppler. Vessel  is tortuous. Right external carotid artery appears patent with no evidence of stenosis. The right vertebral artery is patent with antegrade flow. No evidence of hemodynamically significant stenosis in the right subclavian artery.  Left Carotid: Findings are consistent with less than 50% stenosis of the left proximal internal carotid artery. Laminar flow seen by color Doppler. Left external carotid artery appears patent with no evidence of stenosis. The left vertebral artery is patent with antegrade flow. No evidence of hemodynamically significant stenosis in the left subclavian artery.     Imaging & Doppler Findings:  Right Plaque Morph: The proximal right internal carotid artery demonstrates heterogenous and calcified plaque. The proximal right external carotid artery demonstrates heterogenous and calcified plaque. The mid right common carotid artery demonstrates heterogenous plaque. The distal right common carotid artery demonstrates heterogenous and calcified plaque.  Left Plaque Morph: The proximal left internal carotid artery demonstrates heterogenous and calcified plaque. The proximal left external carotid artery demonstrates heterogenous and calcified plaque. The distal left common carotid artery demonstrates heterogenous and calcified plaque.      Right                        Left    PSV      EDV                PSV      EDV  72 cm/s            CCA P    93 cm/s  49 cm/s            CCA D    71 cm/s  70 cm/s  21 cm/s   ICA P    46 cm/s  10 cm/s  53 cm/s            ICA D    55 cm/s  108 cm/s            ECA     122 cm/s  48 cm/s          Vertebral  58 cm/s  91 cm/s          Subclavian 107 cm/s                  Right Left  ICA/CCA Ratio  1.4  0.6          [id number] Deidre Linn MD  Electronically signed by [id number] Deidre Linn MD on 9/25/2024 at 4:36:43 PM       ** Final **      Physical Exam  Physical Exam  Gen: NAD,, sitting on edge of bed eating lunch  Eyes:  EOM intact  ENT: MMM  Neck: No JVD  Respiratory: CTAB,  no wheezes/rhonchi  Cardiac: irregularly irregular, controlled rate  Abdomen: soft, NT, +BS  Extremities: no edema or cyanosis  Neuro: No focal deficits, alert and oriented x 2  Psych:  appropriate mood and behavior, pleasant but forgetful      Assessment/Plan      Weakness with syncope  -in the setting of covid 19 with history of [place]  -check orthostats, await set  -midodrine ordered with hold parameters  -tele  -echo 05/2024: preserved EF, left atrium moderately dilated  -carotid us < 50% bilateral stenosis  -ID consult appreciated  -Remdesivir protocol to continue  -PT/OT evaluation rec home care  -check walking pulse ox > she dropped to 81% with ambulation on 9/24 (not documented by RN), may need oxygen for home at dc   -continue oxygen PRN, wean as able     Pyuria  -ID consult   -culture with multiple organisms     Afib  -rate is controlled  -tele  -eliquis  -digoxin, level not elevated  -atenolol     Depression  -lexapro     Memory issues/lives alone  -CT head shows chronic small vessel ischemia  -hold off on MoCA while acutely ill  -social work consult  -PT/OT home care arranged  -would benefit from SAAP program  -recommend no driving until evaulated     DVT prophy  -eliquis     Dispo: Continue Remdesivir protocol, possible dc on 9/27 with home care  D/w Dr Oswaldo Ceron PA-C

## 2024-09-26 NOTE — CARE PLAN
"The patient's goals for the shift include \"get better\"    The clinical goals for the shift include patient will maintain spO2 of 92% or greater    Over the shift, the patient did not make progress toward the following goals. Barriers to progression include none patient maintained spO2 of 92% or greater. Recommendations to address these barriers include continue with plan of care.    "

## 2024-09-26 NOTE — CARE PLAN
"The patient's goals for the shift include \"get better\"    The clinical goals for the shift include patient will maintain spO2 of 92% or greater      "

## 2024-09-27 ENCOUNTER — HOME HEALTH ADMISSION (OUTPATIENT)
Dept: HOME HEALTH SERVICES | Facility: HOME HEALTH | Age: 84
End: 2024-09-27
Payer: MEDICARE

## 2024-09-27 ENCOUNTER — DOCUMENTATION (OUTPATIENT)
Dept: HOME HEALTH SERVICES | Facility: HOME HEALTH | Age: 84
End: 2024-09-27
Payer: MEDICARE

## 2024-09-27 VITALS
OXYGEN SATURATION: 97 % | HEART RATE: 83 BPM | RESPIRATION RATE: 16 BRPM | TEMPERATURE: 96.9 F | HEIGHT: 62 IN | DIASTOLIC BLOOD PRESSURE: 103 MMHG | SYSTOLIC BLOOD PRESSURE: 155 MMHG | BODY MASS INDEX: 32.58 KG/M2 | WEIGHT: 177.03 LBS

## 2024-09-27 LAB
ALBUMIN SERPL BCP-MCNC: 3.3 G/DL (ref 3.4–5)
ALP SERPL-CCNC: 47 U/L (ref 33–136)
ALT SERPL W P-5'-P-CCNC: 13 U/L (ref 7–45)
ANION GAP SERPL CALC-SCNC: 12 MMOL/L (ref 10–20)
AST SERPL W P-5'-P-CCNC: 27 U/L (ref 9–39)
BASOPHILS # BLD AUTO: 0.04 X10*3/UL (ref 0–0.1)
BASOPHILS NFR BLD AUTO: 0.9 %
BILIRUB SERPL-MCNC: 0.4 MG/DL (ref 0–1.2)
BUN SERPL-MCNC: 14 MG/DL (ref 6–23)
CALCIUM SERPL-MCNC: 8.7 MG/DL (ref 8.6–10.3)
CHLORIDE SERPL-SCNC: 106 MMOL/L (ref 98–107)
CO2 SERPL-SCNC: 28 MMOL/L (ref 21–32)
CREAT SERPL-MCNC: 0.54 MG/DL (ref 0.5–1.05)
EGFRCR SERPLBLD CKD-EPI 2021: >90 ML/MIN/1.73M*2
EOSINOPHIL # BLD AUTO: 0.19 X10*3/UL (ref 0–0.4)
EOSINOPHIL NFR BLD AUTO: 4.1 %
ERYTHROCYTE [DISTWIDTH] IN BLOOD BY AUTOMATED COUNT: 14 % (ref 11.5–14.5)
GLUCOSE SERPL-MCNC: 98 MG/DL (ref 74–99)
HCT VFR BLD AUTO: 43.8 % (ref 36–46)
HGB BLD-MCNC: 14.2 G/DL (ref 12–16)
IMM GRANULOCYTES # BLD AUTO: 0.01 X10*3/UL (ref 0–0.5)
IMM GRANULOCYTES NFR BLD AUTO: 0.2 % (ref 0–0.9)
LYMPHOCYTES # BLD AUTO: 1.78 X10*3/UL (ref 0.8–3)
LYMPHOCYTES NFR BLD AUTO: 38.7 %
MCH RBC QN AUTO: 28.8 PG (ref 26–34)
MCHC RBC AUTO-ENTMCNC: 32.4 G/DL (ref 32–36)
MCV RBC AUTO: 89 FL (ref 80–100)
MONOCYTES # BLD AUTO: 0.82 X10*3/UL (ref 0.05–0.8)
MONOCYTES NFR BLD AUTO: 17.8 %
NEUTROPHILS # BLD AUTO: 1.76 X10*3/UL (ref 1.6–5.5)
NEUTROPHILS NFR BLD AUTO: 38.3 %
NRBC BLD-RTO: 0 /100 WBCS (ref 0–0)
PLATELET # BLD AUTO: 190 X10*3/UL (ref 150–450)
POTASSIUM SERPL-SCNC: 3.6 MMOL/L (ref 3.5–5.3)
PROT SERPL-MCNC: 6.4 G/DL (ref 6.4–8.2)
RBC # BLD AUTO: 4.93 X10*6/UL (ref 4–5.2)
SODIUM SERPL-SCNC: 142 MMOL/L (ref 136–145)
WBC # BLD AUTO: 4.6 X10*3/UL (ref 4.4–11.3)

## 2024-09-27 PROCEDURE — 85025 COMPLETE CBC W/AUTO DIFF WBC: CPT | Performed by: STUDENT IN AN ORGANIZED HEALTH CARE EDUCATION/TRAINING PROGRAM

## 2024-09-27 PROCEDURE — 36415 COLL VENOUS BLD VENIPUNCTURE: CPT | Performed by: STUDENT IN AN ORGANIZED HEALTH CARE EDUCATION/TRAINING PROGRAM

## 2024-09-27 PROCEDURE — 2500000004 HC RX 250 GENERAL PHARMACY W/ HCPCS (ALT 636 FOR OP/ED): Performed by: INTERNAL MEDICINE

## 2024-09-27 PROCEDURE — 2500000001 HC RX 250 WO HCPCS SELF ADMINISTERED DRUGS (ALT 637 FOR MEDICARE OP): Performed by: PHYSICIAN ASSISTANT

## 2024-09-27 PROCEDURE — 99239 HOSP IP/OBS DSCHRG MGMT >30: CPT | Performed by: FAMILY MEDICINE

## 2024-09-27 PROCEDURE — 80053 COMPREHEN METABOLIC PANEL: CPT | Performed by: STUDENT IN AN ORGANIZED HEALTH CARE EDUCATION/TRAINING PROGRAM

## 2024-09-27 PROCEDURE — 94761 N-INVAS EAR/PLS OXIMETRY MLT: CPT

## 2024-09-27 PROCEDURE — 2500000001 HC RX 250 WO HCPCS SELF ADMINISTERED DRUGS (ALT 637 FOR MEDICARE OP): Performed by: STUDENT IN AN ORGANIZED HEALTH CARE EDUCATION/TRAINING PROGRAM

## 2024-09-27 RX ORDER — ATENOLOL 25 MG/1
25 TABLET ORAL DAILY
Start: 2024-09-28 | End: 2024-10-28

## 2024-09-27 ASSESSMENT — PAIN - FUNCTIONAL ASSESSMENT: PAIN_FUNCTIONAL_ASSESSMENT: 0-10

## 2024-09-27 ASSESSMENT — PAIN SCALES - GENERAL: PAINLEVEL_OUTOF10: 0 - NO PAIN

## 2024-09-27 NOTE — CARE PLAN
"The patient's goals for the shift include \"get better\"    The clinical goals for the shift include pt will remain free of injury    Over the shift, the patient did not make progress toward the following goals. Barriers to progression include . Recommendations to address these barriers include .    "

## 2024-09-27 NOTE — HH CARE COORDINATION
Home Care received a Referral for Nursing, Physical Therapy, and Occupational Therapy. We have processed the referral for a Start of Care on 09/28-09/29.     If you have any questions or concerns, please feel free to contact us at [phone]. Follow the prompts, enter your five digit zip code, and you will be directed to your care team on [facility].

## 2024-09-27 NOTE — CARE PLAN
"The patient's goals for the shift include \"get better\"    The clinical goals for the shift include Pt will remain safe throughout shift.      "

## 2024-09-27 NOTE — PROGRESS NOTES
09/27/24 1316   Discharge Planning   Living Arrangements Alone   Support Systems Children   Assistance Needed A&0x 2-3, independent with ADLs, no DME, drives, room air, no cpap or bipap, not active with any HHC, [facility]   Type of Residence Private residence   Number of Stairs to Enter Residence 1   Number of Stairs Within Residence 0   Do you have animals or pets at home? Yes   Type of Animals or Pets dog   Who is requesting discharge planning? Provider   Home or Post Acute Services In home services   Type of Home Care Services Home nursing visits;Home OT;Home PT   Expected Discharge Disposition Home H  (Home alone with new [facility] for SN, PT, OT. [facility] to send internal referral for HHC. Home 02 eval completed, does not require 02 on d/c. Daughter to transport home.)   Does the patient need discharge transport arranged? No   RoundTrip coordination needed? No   Patient Choice   Provider Choice list and CMS website (https://medicare.gov/care-compare#search) for post-acute Quality and Resource Measure Data were provided and reviewed with: Patient

## 2024-09-27 NOTE — DISCHARGE SUMMARY
Discharge Diagnosis  Syncope and collapse    Issues Requiring Follow-Up  PCP follow up for hospitalization and decrease in atenolol    Discharge Meds     Medication List      CHANGE how you take these medications     atenolol 25 mg tablet; Commonly known as: Tenormin; Take 1 tablet (25   mg) by mouth once daily.; Start taking on: September 28, 2024; What   changed: medication strength, how much to take, when to take this     CONTINUE taking these medications     apixaban 5 mg tablet; Commonly known as: Eliquis; Take 1 tablet (5 mg)   by mouth 2 times a day.   B complex-vitamin C-folic acid 0.8 mg tablet; Commonly known as:   Nephro-Radha   cranberry 500 mg capsule   digoxin 125 MCG tablet; Commonly known as: Lanoxin; Take 1 tablet (125   mcg) by mouth 3 times a week.   escitalopram 10 mg tablet; Commonly known as: Lexapro; Take 1 tablet (10   mg) by mouth once daily.   magnesium oxide 400 mg tablet; Commonly known as: Mag-Ox   melatonin 1 mg tablet, sublingual   midodrine 2.5 mg tablet; Commonly known as: Proamatine; Take 1 tablet   (2.5 mg) by mouth 2 times daily (morning and late afternoon).   omeprazole 40 mg DR capsule; Commonly known as: PriLOSEC; Take 1 capsule   (40 mg) by mouth once daily in the morning. Take before meals.   potassium chloride CR 10 mEq ER tablet; Commonly known as: Klor-Con;   TAKE 1 TABLET BY MOUTH ONCE DAILY WITH FOOD       Test Results Pending At Discharge  Pending Labs       No current pending labs.            Hospital Course  85yo CF with PMH of Afib on eliquis, depression, orthostatic hypotension, and memory issues that presented to the ED with weakness and syncope, ultimately admitted for COVID19. Patient's weakness was likely related to COVID as workup including carotid ultrasound and orthostatics were negative. She was treated per ID with remdesivir and had resolution of symptoms. Patient does have memory issues and SW was consulted with ultimate recommendation that patient have  supervision until MOCA can be completed by PCP and no driving until this time. [facility] referral was also made. Patient appears medically stable for discharge home with C per family preference.    Medically cleared for discharge. Medications reviewed and reconciled. Scripts prepared as needed.  Discussed with the family, , and . Questions answered. Understanding verbalized. Overall time spent on discharge was longer than 35 min.      Pertinent Physical Exam At Time of Discharge  Constitutional: alert and oriented x2, awake, cooperative, no acute distress  Skin: warm and dry  Head/Neck: Normocephalic, atraumatic  Eyes: clear sclera  ENMT: mucous membranes moist  Cardio: irregularly irregular but rate controlled  Resp: CTA bilaterally, good respiratory effort  Gastrointestinal: Soft, nontender, nondistended  Musculoskeletal: ROM intact, no joint swelling  Extremities: No edema, cyanosis, or clubbing  Neuro: alert and oriented x 2, pleasantly confused  Psychological: Appropriate mood and behavior      Outpatient Follow-Up  Future Appointments   Date Time Provider Department Center   2/27/2025  2:00 PM Scot Rosenberg DO [id number]         Jaimie Chacon DO

## 2024-09-27 NOTE — PROGRESS NOTES
Mercedes Castellanos is a 84 y.o. female on day 2 of admission presenting with Syncope and collapse.    Subjective   Interval History: no fever, no new complaints, the hx is limited        Review of Systems    Objective   Range of Vitals (last 24 hours)  Heart Rate:  []   Temp:  [35.8 °C (96.5 °F)-36.1 °C (96.9 °F)]   Resp:  [13-24]   BP: (151-175)/()   SpO2:  [95 %-97 %]   Daily Weight  09/25/24 : 80.3 kg (177 lb 0.5 oz)    Body mass index is 32.38 kg/m².    Physical Exam  Constitutional:       Appearance: Normal appearance.   HENT:      Head: Normocephalic and atraumatic.      Mouth/Throat:      Mouth: Mucous membranes are moist.      Pharynx: Oropharynx is clear.   Eyes:      Pupils: Pupils are equal, round, and reactive to light.   Cardiovascular:      Rate and Rhythm: Normal rate and regular rhythm.      Heart sounds: Normal heart sounds.   Pulmonary:      Effort: Pulmonary effort is normal.      Breath sounds: Normal breath sounds.   Abdominal:      General: Abdomen is flat. Bowel sounds are normal.      Palpations: Abdomen is soft.   Musculoskeletal:      Cervical back: Normal range of motion.   Neurological:      Mental Status: She is alert.         Antibiotics  This patient does not have an active medication from one of the medication groupers.    Relevant Results  Labs  Results from last 72 hours   Lab Units 09/27/24 0539 09/26/24  0539 09/25/24  0513   WBC AUTO x10*3/uL 4.6 5.1 7.0   HEMOGLOBIN g/dL 14.2 13.6 13.3   HEMATOCRIT % 43.8 43.0 41.1   PLATELETS AUTO x10*3/uL 190 178 173   NEUTROS PCT AUTO % 38.3 45.4 57.6   LYMPHS PCT AUTO % 38.7 25.3 15.7   MONOS PCT AUTO % 17.8 25.0 25.7   EOS PCT AUTO % 4.1 3.1 0.1     Results from last 72 hours   Lab Units 09/27/24 0539 09/26/24  0539 09/25/24  0513   SODIUM mmol/L 142 140 136   POTASSIUM mmol/L 3.6 3.8 3.4*   CHLORIDE mmol/L 106 106 102   CO2 mmol/L 28 27 27   BUN mg/dL 14 14 13   CREATININE mg/dL 0.54 0.62 0.73   GLUCOSE mg/dL 98 105* 109*    CALCIUM mg/dL 8.7 8.6 8.4*   ANION GAP mmol/L 12 11 10   EGFR mL/min/1.73m*2 >90 88 81     Results from last 72 hours   Lab Units 09/27/24  0539 09/26/24  0539 09/25/24  0513   ALK PHOS U/L 47 46 47   BILIRUBIN TOTAL mg/dL 0.4 0.3 0.3   PROTEIN TOTAL g/dL 6.4 6.6 6.4   ALT U/L 13 10 10   AST U/L 27 23 19   ALBUMIN g/dL 3.3* 3.4 3.3*     Estimated Creatinine Clearance: 76.2 mL/min (by C-G formula based on SCr of 0.54 mg/dL).  C-Reactive Protein   Date Value Ref Range Status   09/24/2024 4.96 (H) <1.00 mg/dL Final     Microbiology  Reviewed  Imaging  Reviewed        Assessment/Plan   COVID19 infection, off O2, negative cxr  Pyuria, urine with mixed vasyl     Recommendations :  Can discontinue Remdesivir with discharge  Discussed with the medical team     I spent minutes in the professional and overall care of this patient.      Jurgen Falk MD

## 2024-09-30 ENCOUNTER — HOME CARE VISIT (OUTPATIENT)
Dept: HOME HEALTH SERVICES | Facility: HOME HEALTH | Age: 84
End: 2024-09-30
Payer: MEDICARE

## 2024-09-30 VITALS
TEMPERATURE: 97.2 F | OXYGEN SATURATION: 95 % | SYSTOLIC BLOOD PRESSURE: 110 MMHG | DIASTOLIC BLOOD PRESSURE: 70 MMHG | HEART RATE: 66 BPM | RESPIRATION RATE: 18 BRPM

## 2024-09-30 PROCEDURE — G0299 HHS/HOSPICE OF RN EA 15 MIN: HCPCS

## 2024-09-30 ASSESSMENT — ENCOUNTER SYMPTOMS
FORGETFULNESS: 1
SHORTNESS OF BREATH: 1
PAIN LOCATION: HEAD
HYPOTENSION: 1
APPETITE LEVEL: GOOD
PAIN: 1
HIGHEST PAIN SEVERITY IN PAST 24 HOURS: 4/10
LOWER EXTREMITY EDEMA: 1
FATIGUE: 1
DIZZINESS: 1

## 2024-09-30 ASSESSMENT — ACTIVITIES OF DAILY LIVING (ADL)
AMBULATION ASSISTANCE: STAND BY ASSIST
ENTERING_EXITING_HOME: STAND BY ASSIST
CURRENT_FUNCTION: STAND BY ASSIST
OASIS_M1830: 03

## 2024-10-01 ENCOUNTER — HOME CARE VISIT (OUTPATIENT)
Dept: HOME HEALTH SERVICES | Facility: HOME HEALTH | Age: 84
End: 2024-10-01
Payer: MEDICARE

## 2024-10-01 PROCEDURE — G0151 HHCP-SERV OF PT,EA 15 MIN: HCPCS

## 2024-10-01 PROCEDURE — G0152 HHCP-SERV OF OT,EA 15 MIN: HCPCS

## 2024-10-01 ASSESSMENT — ENCOUNTER SYMPTOMS
PERSON REPORTING PAIN: PATIENT
DENIES PAIN: 1
DENIES PAIN: 1
PERSON REPORTING PAIN: PATIENT

## 2024-10-01 ASSESSMENT — ACTIVITIES OF DAILY LIVING (ADL)
AMBULATION ASSISTANCE: 1
AMBULATION ASSISTANCE: MINIMUM ASSIST

## 2024-10-01 NOTE — HOME HEALTH
9 23 24 hospitalized for syncopal episode with fall.  Patient that currently she has returned to her prior level of function.  Lives alone but has daughter who visits often, visits daily, takes dog out for walk.  Grandson stops in too.    12 seconds TUG.       Drives [employer].  Dtr [place].   In and out of shower, bathroom, bedroom, bed, garage (down and then up a single step there), as well as transfers on and off her easy chair and in and out of the kitchen.  All of this was observed today.  I made recommendation that patient consider replacing a towel bar or rack with a grab bar rated for human use which she could still hang a towel on.   Patient demonstrated understanding of that recommendation and all other recommendations made.

## 2024-10-01 NOTE — SIGNIFICANT EVENT
Follow Up Phone Call    Outgoing phone call    Spoke to: Mercedes Castellanos Relationship:self   Phone number: [phone]      Outcome: I left a message on answering machine   No chief complaint on file.         Diagnosis:Not applicable

## 2024-10-02 NOTE — SIGNIFICANT EVENT
Follow Up Phone Call    Outgoing phone call    Spoke to: Mercedes Castellanos Relationship:familydaughter   Phone number: [phone]      Outcome: contacted patient/ family   No chief complaint on file.         Diagnosis:Not applicable    States she is feeling better. No further questions or concerns.

## 2024-10-03 VITALS — SYSTOLIC BLOOD PRESSURE: 148 MMHG | OXYGEN SATURATION: 97 % | HEART RATE: 76 BPM | DIASTOLIC BLOOD PRESSURE: 96 MMHG

## 2024-10-03 ASSESSMENT — ACTIVITIES OF DAILY LIVING (ADL)
LIGHT HOUSEKEEPING: INDEPENDENT
LAUNDRY: INDEPENDENT
LAUNDRY ASSESSED: 1
HOUSEKEEPING ASSESSED: 1
DRESSING_LB_CURRENT_FUNCTION: INDEPENDENT
PREPARING MEALS: INDEPENDENT
AMBULATION ASSISTANCE: INDEPENDENT
AMBULATION ASSISTANCE: 1
TOILETING: 1
TOILETING: INDEPENDENT
TELEPHONE USE ASSESSED: 1
DRESSING_UB_CURRENT_FUNCTION: INDEPENDENT
BATHING ASSESSED: 1
BATHING_CURRENT_FUNCTION: INDEPENDENT

## 2024-10-08 ENCOUNTER — HOME CARE VISIT (OUTPATIENT)
Dept: HOME HEALTH SERVICES | Facility: HOME HEALTH | Age: 84
End: 2024-10-08
Payer: MEDICARE

## 2024-10-08 VITALS
TEMPERATURE: 97.8 F | SYSTOLIC BLOOD PRESSURE: 132 MMHG | OXYGEN SATURATION: 95 % | HEART RATE: 78 BPM | RESPIRATION RATE: 18 BRPM | DIASTOLIC BLOOD PRESSURE: 80 MMHG

## 2024-10-08 PROCEDURE — G0299 HHS/HOSPICE OF RN EA 15 MIN: HCPCS

## 2024-10-08 SDOH — ECONOMIC STABILITY: GENERAL

## 2024-10-08 ASSESSMENT — ENCOUNTER SYMPTOMS
PAIN: 1
SHORTNESS OF BREATH: 1
PAIN LOCATION: HEAD
HIGHEST PAIN SEVERITY IN PAST 24 HOURS: 3/10
APPETITE LEVEL: GOOD

## 2024-10-15 ENCOUNTER — HOME CARE VISIT (OUTPATIENT)
Dept: HOME HEALTH SERVICES | Facility: HOME HEALTH | Age: 84
End: 2024-10-15
Payer: MEDICARE

## 2024-10-15 VITALS
DIASTOLIC BLOOD PRESSURE: 70 MMHG | OXYGEN SATURATION: 95 % | SYSTOLIC BLOOD PRESSURE: 132 MMHG | RESPIRATION RATE: 18 BRPM | HEART RATE: 74 BPM | TEMPERATURE: 97.3 F

## 2024-10-15 PROCEDURE — G0299 HHS/HOSPICE OF RN EA 15 MIN: HCPCS

## 2024-10-15 SDOH — ECONOMIC STABILITY: GENERAL

## 2024-10-15 ASSESSMENT — ACTIVITIES OF DAILY LIVING (ADL)
AMBULATION ASSISTANCE: STAND BY ASSIST
CURRENT_FUNCTION: STAND BY ASSIST

## 2024-10-15 ASSESSMENT — ENCOUNTER SYMPTOMS
DENIES PAIN: 1
APPETITE LEVEL: GOOD

## 2024-10-21 ENCOUNTER — HOME CARE VISIT (OUTPATIENT)
Dept: HOME HEALTH SERVICES | Facility: HOME HEALTH | Age: 84
End: 2024-10-21
Payer: MEDICARE

## 2024-10-21 VITALS
OXYGEN SATURATION: 97 % | TEMPERATURE: 97 F | HEART RATE: 83 BPM | RESPIRATION RATE: 18 BRPM | SYSTOLIC BLOOD PRESSURE: 140 MMHG | DIASTOLIC BLOOD PRESSURE: 80 MMHG

## 2024-10-21 PROCEDURE — G0299 HHS/HOSPICE OF RN EA 15 MIN: HCPCS

## 2024-10-21 SDOH — ECONOMIC STABILITY: GENERAL

## 2024-10-21 ASSESSMENT — ENCOUNTER SYMPTOMS
DENIES PAIN: 1
APPETITE LEVEL: GOOD

## 2024-10-21 ASSESSMENT — ACTIVITIES OF DAILY LIVING (ADL)
AMBULATION ASSISTANCE: STAND BY ASSIST
CURRENT_FUNCTION: STAND BY ASSIST

## 2024-10-29 ENCOUNTER — HOME CARE VISIT (OUTPATIENT)
Dept: HOME HEALTH SERVICES | Facility: HOME HEALTH | Age: 84
End: 2024-10-29
Payer: MEDICARE

## 2024-10-29 VITALS
RESPIRATION RATE: 18 BRPM | HEART RATE: 83 BPM | TEMPERATURE: 97.7 F | DIASTOLIC BLOOD PRESSURE: 89 MMHG | OXYGEN SATURATION: 95 % | SYSTOLIC BLOOD PRESSURE: 130 MMHG

## 2024-10-29 PROCEDURE — G0299 HHS/HOSPICE OF RN EA 15 MIN: HCPCS

## 2024-10-29 ASSESSMENT — ACTIVITIES OF DAILY LIVING (ADL)
OASIS_M1830: 00
HOME_HEALTH_OASIS: 00

## 2024-10-29 ASSESSMENT — ENCOUNTER SYMPTOMS: DENIES PAIN: 1

## 2024-11-03 DIAGNOSIS — I10 BENIGN ESSENTIAL HTN: ICD-10-CM

## 2024-11-04 ENCOUNTER — LAB REQUISITION (OUTPATIENT)
Dept: LAB | Facility: HOSPITAL | Age: 84
End: 2024-11-04
Payer: MEDICARE

## 2024-11-04 DIAGNOSIS — N39.0 URINARY TRACT INFECTION, SITE NOT SPECIFIED: ICD-10-CM

## 2024-11-04 DIAGNOSIS — I10 BENIGN ESSENTIAL HTN: ICD-10-CM

## 2024-11-04 PROCEDURE — 87186 SC STD MICRODIL/AGAR DIL: CPT

## 2024-11-04 PROCEDURE — 87086 URINE CULTURE/COLONY COUNT: CPT

## 2024-11-05 RX ORDER — APIXABAN 5 MG/1
5 TABLET, FILM COATED ORAL 2 TIMES DAILY
Qty: 60 TABLET | Refills: 11 | OUTPATIENT
Start: 2024-11-05

## 2024-11-07 LAB — BACTERIA UR CULT: ABNORMAL

## 2024-12-11 ENCOUNTER — APPOINTMENT (OUTPATIENT)
Dept: RADIOLOGY | Facility: HOSPITAL | Age: 84
End: 2024-12-11
Payer: MEDICARE

## 2024-12-11 ENCOUNTER — HOSPITAL ENCOUNTER (OUTPATIENT)
Facility: HOSPITAL | Age: 84
Setting detail: OBSERVATION
Discharge: HOME | End: 2024-12-11
Attending: INTERNAL MEDICINE | Admitting: INTERNAL MEDICINE
Payer: MEDICARE

## 2024-12-11 DIAGNOSIS — I95.1 ORTHOSTATIC HYPOTENSION: ICD-10-CM

## 2024-12-11 DIAGNOSIS — R55 SYNCOPE AND COLLAPSE: ICD-10-CM

## 2024-12-11 DIAGNOSIS — I95.9 HYPOTENSION, UNSPECIFIED HYPOTENSION TYPE: ICD-10-CM

## 2024-12-11 DIAGNOSIS — S92.902A CLOSED FRACTURE OF LEFT FOOT, INITIAL ENCOUNTER: Primary | ICD-10-CM

## 2024-12-11 PROCEDURE — 73610 X-RAY EXAM OF ANKLE: CPT | Mod: LT

## 2024-12-11 PROCEDURE — 99285 EMERGENCY DEPT VISIT HI MDM: CPT

## 2024-12-11 PROCEDURE — 73610 X-RAY EXAM OF ANKLE: CPT | Mod: LEFT SIDE | Performed by: STUDENT IN AN ORGANIZED HEALTH CARE EDUCATION/TRAINING PROGRAM

## 2024-12-11 PROCEDURE — 2500000001 HC RX 250 WO HCPCS SELF ADMINISTERED DRUGS (ALT 637 FOR MEDICARE OP)

## 2024-12-11 PROCEDURE — 73630 X-RAY EXAM OF FOOT: CPT | Mod: LEFT SIDE | Performed by: STUDENT IN AN ORGANIZED HEALTH CARE EDUCATION/TRAINING PROGRAM

## 2024-12-11 PROCEDURE — 73630 X-RAY EXAM OF FOOT: CPT | Mod: LT

## 2024-12-11 RX ORDER — TRAMADOL HYDROCHLORIDE 50 MG/1
50 TABLET ORAL EVERY 8 HOURS PRN
Status: DISCONTINUED | OUTPATIENT
Start: 2024-12-11 | End: 2024-12-16 | Stop reason: HOSPADM

## 2024-12-11 ASSESSMENT — LIFESTYLE VARIABLES
EVER HAD A DRINK FIRST THING IN THE MORNING TO STEADY YOUR NERVES TO GET RID OF A HANGOVER: NO
HAVE PEOPLE ANNOYED YOU BY CRITICIZING YOUR DRINKING: NO
HAVE YOU EVER FELT YOU SHOULD CUT DOWN ON YOUR DRINKING: NO
TOTAL SCORE: 0
EVER FELT BAD OR GUILTY ABOUT YOUR DRINKING: NO

## 2024-12-11 ASSESSMENT — PAIN SCALES - GENERAL: PAINLEVEL_OUTOF10: 2

## 2024-12-11 ASSESSMENT — PAIN DESCRIPTION - ORIENTATION: ORIENTATION: LEFT

## 2024-12-11 ASSESSMENT — PAIN - FUNCTIONAL ASSESSMENT: PAIN_FUNCTIONAL_ASSESSMENT: 0-10

## 2024-12-11 ASSESSMENT — PAIN DESCRIPTION - LOCATION: LOCATION: FOOT

## 2024-12-12 ENCOUNTER — APPOINTMENT (OUTPATIENT)
Dept: RADIOLOGY | Facility: HOSPITAL | Age: 84
End: 2024-12-12
Payer: MEDICARE

## 2024-12-12 PROBLEM — S92.902A CLOSED FRACTURE OF LEFT FOOT, INITIAL ENCOUNTER: Status: ACTIVE | Noted: 2024-12-12
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
## 2024-12-12 LAB
ANION GAP SERPL CALCULATED.3IONS-SCNC: 11 MMOL/L (ref 10–20)
BASOPHILS # BLD AUTO: 0.05 X10*3/UL (ref 0–0.1)
BASOPHILS NFR BLD AUTO: 0.4 %
BUN SERPL-MCNC: 14 MG/DL (ref 6–23)
CALCIUM SERPL-MCNC: 9 MG/DL (ref 8.6–10.3)
CHLORIDE SERPL-SCNC: 109 MMOL/L (ref 98–107)
CO2 SERPL-SCNC: 26 MMOL/L (ref 21–32)
CREAT SERPL-MCNC: 0.71 MG/DL (ref 0.5–1.05)
EGFRCR SERPLBLD CKD-EPI 2021: 84 ML/MIN/1.73M*2
EOSINOPHIL # BLD AUTO: 0.19 X10*3/UL (ref 0–0.4)
EOSINOPHIL NFR BLD AUTO: 1.5 %
ERYTHROCYTE [DISTWIDTH] IN BLOOD BY AUTOMATED COUNT: 15.5 % (ref 11.5–14.5)
GLUCOSE SERPL-MCNC: 92 MG/DL (ref 74–99)
HCT VFR BLD AUTO: 41.9 % (ref 36–46)
HGB BLD-MCNC: 13.8 G/DL (ref 12–16)
IMM GRANULOCYTES # BLD AUTO: 0.04 X10*3/UL (ref 0–0.5)
IMM GRANULOCYTES NFR BLD AUTO: 0.3 % (ref 0–0.9)
LYMPHOCYTES # BLD AUTO: 1.67 X10*3/UL (ref 0.8–3)
LYMPHOCYTES NFR BLD AUTO: 13.5 %
MCH RBC QN AUTO: 30.1 PG (ref 26–34)
MCHC RBC AUTO-ENTMCNC: 32.9 G/DL (ref 32–36)
MCV RBC AUTO: 92 FL (ref 80–100)
MONOCYTES # BLD AUTO: 0.89 X10*3/UL (ref 0.05–0.8)
MONOCYTES NFR BLD AUTO: 7.2 %
NEUTROPHILS # BLD AUTO: 9.55 X10*3/UL (ref 1.6–5.5)
NEUTROPHILS NFR BLD AUTO: 77.1 %
NRBC BLD-RTO: 0 /100 WBCS (ref 0–0)
PLATELET # BLD AUTO: 238 X10*3/UL (ref 150–450)
POTASSIUM SERPL-SCNC: 3.8 MMOL/L (ref 3.5–5.3)
RBC # BLD AUTO: 4.58 X10*6/UL (ref 4–5.2)
SODIUM SERPL-SCNC: 142 MMOL/L (ref 136–145)
WBC # BLD AUTO: 12.4 X10*3/UL (ref 4.4–11.3)

## 2024-12-12 PROCEDURE — 2500000002 HC RX 250 W HCPCS SELF ADMINISTERED DRUGS (ALT 637 FOR MEDICARE OP, ALT 636 FOR OP/ED): Performed by: INTERNAL MEDICINE

## 2024-12-12 PROCEDURE — 72125 CT NECK SPINE W/O DYE: CPT | Performed by: RADIOLOGY

## 2024-12-12 PROCEDURE — 2500000001 HC RX 250 WO HCPCS SELF ADMINISTERED DRUGS (ALT 637 FOR MEDICARE OP)

## 2024-12-12 PROCEDURE — 36415 COLL VENOUS BLD VENIPUNCTURE: CPT | Performed by: INTERNAL MEDICINE

## 2024-12-12 PROCEDURE — G0378 HOSPITAL OBSERVATION PER HR: HCPCS

## 2024-12-12 PROCEDURE — 70450 CT HEAD/BRAIN W/O DYE: CPT | Performed by: RADIOLOGY

## 2024-12-12 PROCEDURE — 85025 COMPLETE CBC W/AUTO DIFF WBC: CPT | Performed by: INTERNAL MEDICINE

## 2024-12-12 PROCEDURE — 81003 URINALYSIS AUTO W/O SCOPE: CPT | Performed by: INTERNAL MEDICINE

## 2024-12-12 PROCEDURE — 72125 CT NECK SPINE W/O DYE: CPT

## 2024-12-12 PROCEDURE — 2500000001 HC RX 250 WO HCPCS SELF ADMINISTERED DRUGS (ALT 637 FOR MEDICARE OP): Performed by: INTERNAL MEDICINE

## 2024-12-12 PROCEDURE — 2500000004 HC RX 250 GENERAL PHARMACY W/ HCPCS (ALT 636 FOR OP/ED): Performed by: INTERNAL MEDICINE

## 2024-12-12 PROCEDURE — 80048 BASIC METABOLIC PNL TOTAL CA: CPT | Performed by: INTERNAL MEDICINE

## 2024-12-12 PROCEDURE — 70450 CT HEAD/BRAIN W/O DYE: CPT

## 2024-12-12 RX ORDER — ATENOLOL 25 MG/1
25 TABLET ORAL DAILY
Status: DISCONTINUED | OUTPATIENT
Start: 2024-12-12 | End: 2024-12-16 | Stop reason: HOSPADM

## 2024-12-12 RX ORDER — B-COMPLEX WITH VITAMIN C
1 TABLET ORAL DAILY
Status: DISCONTINUED | OUTPATIENT
Start: 2024-12-12 | End: 2024-12-12 | Stop reason: RX

## 2024-12-12 RX ORDER — POTASSIUM &MAGNESIUM ASPARTATE 250-250 MG
500 CAPSULE ORAL DAILY
COMMUNITY

## 2024-12-12 RX ORDER — ACETAMINOPHEN 325 MG/1
975 TABLET ORAL ONCE
Status: COMPLETED | OUTPATIENT
Start: 2024-12-12 | End: 2024-12-12

## 2024-12-12 RX ORDER — PANTOPRAZOLE SODIUM 40 MG/1
40 TABLET, DELAYED RELEASE ORAL
Status: DISCONTINUED | OUTPATIENT
Start: 2024-12-12 | End: 2024-12-16 | Stop reason: HOSPADM

## 2024-12-12 RX ORDER — DIGOXIN 125 MCG
125 TABLET ORAL 3 TIMES WEEKLY
Status: DISCONTINUED | OUTPATIENT
Start: 2024-12-12 | End: 2024-12-16 | Stop reason: HOSPADM

## 2024-12-12 RX ORDER — ESCITALOPRAM OXALATE 10 MG/1
10 TABLET ORAL DAILY
Status: DISCONTINUED | OUTPATIENT
Start: 2024-12-12 | End: 2024-12-16 | Stop reason: HOSPADM

## 2024-12-12 RX ORDER — ESCITALOPRAM OXALATE 10 MG/1
10 TABLET ORAL DAILY
Status: DISCONTINUED | OUTPATIENT
Start: 2024-12-12 | End: 2024-12-12 | Stop reason: SDUPTHER

## 2024-12-12 RX ORDER — BISMUTH SUBSALICYLATE 262 MG
1 TABLET,CHEWABLE ORAL DAILY
COMMUNITY

## 2024-12-12 RX ORDER — CIPROFLOXACIN 250 MG/1
250 TABLET, FILM COATED ORAL 2 TIMES DAILY
Status: DISCONTINUED | OUTPATIENT
Start: 2024-12-12 | End: 2024-12-12

## 2024-12-12 RX ORDER — MIDODRINE HYDROCHLORIDE 2.5 MG/1
2.5 TABLET ORAL
Status: DISCONTINUED | OUTPATIENT
Start: 2024-12-12 | End: 2024-12-16 | Stop reason: HOSPADM

## 2024-12-12 RX ORDER — LANOLIN ALCOHOL/MO/W.PET/CERES
400 CREAM (GRAM) TOPICAL DAILY
Status: DISCONTINUED | OUTPATIENT
Start: 2024-12-12 | End: 2024-12-16 | Stop reason: HOSPADM

## 2024-12-12 SDOH — SOCIAL STABILITY: SOCIAL INSECURITY: WERE YOU ABLE TO COMPLETE ALL THE BEHAVIORAL HEALTH SCREENINGS?: YES

## 2024-12-12 SDOH — HEALTH STABILITY: MENTAL HEALTH: HOW OFTEN DO YOU HAVE A DRINK CONTAINING ALCOHOL?: NEVER

## 2024-12-12 SDOH — SOCIAL STABILITY: SOCIAL INSECURITY: HAS ANYONE EVER THREATENED TO HURT YOUR FAMILY OR YOUR PETS?: NO

## 2024-12-12 SDOH — SOCIAL STABILITY: SOCIAL INSECURITY: ARE YOU MARRIED, WIDOWED, DIVORCED, SEPARATED, NEVER MARRIED, OR LIVING WITH A PARTNER?: WIDOWED

## 2024-12-12 SDOH — SOCIAL STABILITY: SOCIAL INSECURITY: DOES ANYONE TRY TO KEEP YOU FROM HAVING/CONTACTING OTHER FRIENDS OR DOING THINGS OUTSIDE YOUR HOME?: NO

## 2024-12-12 SDOH — SOCIAL STABILITY: SOCIAL INSECURITY: ARE YOU OR HAVE YOU BEEN THREATENED OR ABUSED PHYSICALLY, EMOTIONALLY, OR SEXUALLY BY ANYONE?: NO

## 2024-12-12 SDOH — ECONOMIC STABILITY: TRANSPORTATION INSECURITY: IN THE PAST 12 MONTHS, HAS LACK OF TRANSPORTATION KEPT YOU FROM MEDICAL APPOINTMENTS OR FROM GETTING MEDICATIONS?: NO

## 2024-12-12 SDOH — ECONOMIC STABILITY: HOUSING INSECURITY: AT ANY TIME IN THE PAST 12 MONTHS, WERE YOU HOMELESS OR LIVING IN A SHELTER (INCLUDING NOW)?: NO

## 2024-12-12 SDOH — HEALTH STABILITY: MENTAL HEALTH: HOW OFTEN DO YOU HAVE SIX OR MORE DRINKS ON ONE OCCASION?: NEVER

## 2024-12-12 SDOH — SOCIAL STABILITY: SOCIAL INSECURITY: WITHIN THE LAST YEAR, HAVE YOU BEEN HUMILIATED OR EMOTIONALLY ABUSED IN OTHER WAYS BY YOUR PARTNER OR EX-PARTNER?: NO

## 2024-12-12 SDOH — ECONOMIC STABILITY: HOUSING INSECURITY: IN THE LAST 12 MONTHS, WAS THERE A TIME WHEN YOU WERE NOT ABLE TO PAY THE MORTGAGE OR RENT ON TIME?: NO

## 2024-12-12 SDOH — SOCIAL STABILITY: SOCIAL NETWORK: HOW OFTEN DO YOU GET TOGETHER WITH FRIENDS OR RELATIVES?: MORE THAN THREE TIMES A WEEK

## 2024-12-12 SDOH — HEALTH STABILITY: PHYSICAL HEALTH: ON AVERAGE, HOW MANY MINUTES DO YOU ENGAGE IN EXERCISE AT THIS LEVEL?: 10 MIN

## 2024-12-12 SDOH — HEALTH STABILITY: MENTAL HEALTH
DO YOU FEEL STRESS - TENSE, RESTLESS, NERVOUS, OR ANXIOUS, OR UNABLE TO SLEEP AT NIGHT BECAUSE YOUR MIND IS TROUBLED ALL THE TIME - THESE DAYS?: NOT AT ALL

## 2024-12-12 SDOH — SOCIAL STABILITY: SOCIAL NETWORK: HOW OFTEN DO YOU ATTEND MEETINGS OF THE CLUBS OR ORGANIZATIONS YOU BELONG TO?: NEVER

## 2024-12-12 SDOH — SOCIAL STABILITY: SOCIAL INSECURITY: WITHIN THE LAST YEAR, HAVE YOU BEEN AFRAID OF YOUR PARTNER OR EX-PARTNER?: NO

## 2024-12-12 SDOH — SOCIAL STABILITY: SOCIAL INSECURITY: HAVE YOU HAD THOUGHTS OF HARMING ANYONE ELSE?: NO

## 2024-12-12 SDOH — SOCIAL STABILITY: SOCIAL INSECURITY
WITHIN THE LAST YEAR, HAVE YOU BEEN RAPED OR FORCED TO HAVE ANY KIND OF SEXUAL ACTIVITY BY YOUR PARTNER OR EX-PARTNER?: NO

## 2024-12-12 SDOH — SOCIAL STABILITY: SOCIAL NETWORK
DO YOU BELONG TO ANY CLUBS OR ORGANIZATIONS SUCH AS CHURCH GROUPS, UNIONS, FRATERNAL OR ATHLETIC GROUPS, OR SCHOOL GROUPS?: NO

## 2024-12-12 SDOH — ECONOMIC STABILITY: INCOME INSECURITY: IN THE PAST 12 MONTHS HAS THE ELECTRIC, GAS, OIL, OR WATER COMPANY THREATENED TO SHUT OFF SERVICES IN YOUR HOME?: NO

## 2024-12-12 SDOH — HEALTH STABILITY: PHYSICAL HEALTH: ON AVERAGE, HOW MANY DAYS PER WEEK DO YOU ENGAGE IN MODERATE TO STRENUOUS EXERCISE (LIKE A BRISK WALK)?: 2 DAYS

## 2024-12-12 SDOH — SOCIAL STABILITY: SOCIAL INSECURITY: DO YOU FEEL UNSAFE GOING BACK TO THE PLACE WHERE YOU ARE LIVING?: NO

## 2024-12-12 SDOH — SOCIAL STABILITY: SOCIAL INSECURITY: ABUSE: ADULT

## 2024-12-12 SDOH — ECONOMIC STABILITY: FOOD INSECURITY: WITHIN THE PAST 12 MONTHS, YOU WORRIED THAT YOUR FOOD WOULD RUN OUT BEFORE YOU GOT THE MONEY TO BUY MORE.: NEVER TRUE

## 2024-12-12 SDOH — SOCIAL STABILITY: SOCIAL INSECURITY: DO YOU FEEL ANYONE HAS EXPLOITED OR TAKEN ADVANTAGE OF YOU FINANCIALLY OR OF YOUR PERSONAL PROPERTY?: NO

## 2024-12-12 SDOH — ECONOMIC STABILITY: HOUSING INSECURITY: IN THE PAST 12 MONTHS, HOW MANY TIMES HAVE YOU MOVED WHERE YOU WERE LIVING?: 0

## 2024-12-12 SDOH — ECONOMIC STABILITY: FOOD INSECURITY: HOW HARD IS IT FOR YOU TO PAY FOR THE VERY BASICS LIKE FOOD, HOUSING, MEDICAL CARE, AND HEATING?: NOT HARD AT ALL

## 2024-12-12 SDOH — HEALTH STABILITY: MENTAL HEALTH: HOW MANY DRINKS CONTAINING ALCOHOL DO YOU HAVE ON A TYPICAL DAY WHEN YOU ARE DRINKING?: PATIENT DOES NOT DRINK

## 2024-12-12 SDOH — ECONOMIC STABILITY: FOOD INSECURITY: WITHIN THE PAST 12 MONTHS, THE FOOD YOU BOUGHT JUST DIDN'T LAST AND YOU DIDN'T HAVE MONEY TO GET MORE.: NEVER TRUE

## 2024-12-12 SDOH — SOCIAL STABILITY: SOCIAL NETWORK: HOW OFTEN DO YOU ATTEND CHURCH OR RELIGIOUS SERVICES?: NEVER

## 2024-12-12 SDOH — SOCIAL STABILITY: SOCIAL INSECURITY: HAVE YOU HAD ANY THOUGHTS OF HARMING ANYONE ELSE?: NO

## 2024-12-12 SDOH — SOCIAL STABILITY: SOCIAL INSECURITY: ARE THERE ANY APPARENT SIGNS OF INJURIES/BEHAVIORS THAT COULD BE RELATED TO ABUSE/NEGLECT?: NO

## 2024-12-12 ASSESSMENT — COGNITIVE AND FUNCTIONAL STATUS - GENERAL
DAILY ACTIVITIY SCORE: 24
PATIENT BASELINE BEDBOUND: NO
MOVING TO AND FROM BED TO CHAIR: A LITTLE
CLIMB 3 TO 5 STEPS WITH RAILING: A LITTLE
MOBILITY SCORE: 23
CLIMB 3 TO 5 STEPS WITH RAILING: A LOT
STANDING UP FROM CHAIR USING ARMS: A LITTLE
TOILETING: A LITTLE
WALKING IN HOSPITAL ROOM: A LITTLE
MOBILITY SCORE: 19
DRESSING REGULAR LOWER BODY CLOTHING: A LITTLE
DAILY ACTIVITIY SCORE: 22

## 2024-12-12 ASSESSMENT — LIFESTYLE VARIABLES
SUBSTANCE_ABUSE_PAST_12_MONTHS: NO
HOW OFTEN DO YOU HAVE A DRINK CONTAINING ALCOHOL: NEVER
AUDIT-C TOTAL SCORE: 0
AUDIT-C TOTAL SCORE: 0
PRESCIPTION_ABUSE_PAST_12_MONTHS: NO
SKIP TO QUESTIONS 9-10: 1
HOW MANY STANDARD DRINKS CONTAINING ALCOHOL DO YOU HAVE ON A TYPICAL DAY: PATIENT DOES NOT DRINK
SKIP TO QUESTIONS 9-10: 1
AUDIT-C TOTAL SCORE: 0
HOW OFTEN DO YOU HAVE 6 OR MORE DRINKS ON ONE OCCASION: NEVER

## 2024-12-12 ASSESSMENT — ENCOUNTER SYMPTOMS
ACTIVITY CHANGE: 1
RESPIRATORY NEGATIVE: 1
PSYCHIATRIC NEGATIVE: 1
ENDOCRINE NEGATIVE: 1
EYES NEGATIVE: 1
GASTROINTESTINAL NEGATIVE: 1
HEMATOLOGIC/LYMPHATIC NEGATIVE: 1
NEUROLOGICAL NEGATIVE: 1
CARDIOVASCULAR NEGATIVE: 1

## 2024-12-12 ASSESSMENT — ACTIVITIES OF DAILY LIVING (ADL)
PATIENT'S MEMORY ADEQUATE TO SAFELY COMPLETE DAILY ACTIVITIES?: NO
JUDGMENT_ADEQUATE_SAFELY_COMPLETE_DAILY_ACTIVITIES: YES
ASSISTIVE_DEVICE: DENTURES UPPER;DENTURES LOWER
LACK_OF_TRANSPORTATION: NO
BATHING: INDEPENDENT
WALKS IN HOME: INDEPENDENT
GROOMING: INDEPENDENT
HEARING - LEFT EAR: FUNCTIONAL
DRESSING YOURSELF: INDEPENDENT
LACK_OF_TRANSPORTATION: NO
FEEDING YOURSELF: INDEPENDENT
ADEQUATE_TO_COMPLETE_ADL: YES
TOILETING: INDEPENDENT
HEARING - RIGHT EAR: FUNCTIONAL

## 2024-12-12 ASSESSMENT — PAIN - FUNCTIONAL ASSESSMENT
PAIN_FUNCTIONAL_ASSESSMENT: FLACC (FACE, LEGS, ACTIVITY, CRY, CONSOLABILITY)
PAIN_FUNCTIONAL_ASSESSMENT: 0-10
PAIN_FUNCTIONAL_ASSESSMENT: 0-10

## 2024-12-12 ASSESSMENT — PAIN SCALES - GENERAL
PAINLEVEL_OUTOF10: 7
PAINLEVEL_OUTOF10: 0 - NO PAIN
PAINLEVEL_OUTOF10: 0 - NO PAIN

## 2024-12-12 ASSESSMENT — PAIN SCALES - PAIN ASSESSMENT IN ADVANCED DEMENTIA (PAINAD): TOTALSCORE: MEDICATION (SEE MAR)

## 2024-12-12 ASSESSMENT — PAIN DESCRIPTION - DESCRIPTORS: DESCRIPTORS: ACHING

## 2024-12-12 NOTE — ED NOTES
In room putting a pure wick on patient and she states she may have hit her head this morning when she fell but she's not sure.  Patient states her head was sore but is not now.  Informed Deanna Robertson PA-C and will put in new order for CT scan.     Pippa Perkins RN  12/12/24 0006

## 2024-12-12 NOTE — H&P
History Of Present Illness  Mercedes Castellanos is a 84 y.o. female presenting with S/P fall left foot fracture dr Zhou consulted.     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), Disorder of the skin and subcutaneous tissue, unspecified (01/12/2017), Dorsalgia, unspecified (12/09/2013), Dyskinesia of esophagus (09/16/2019), Elevated blood-pressure reading, without diagnosis of hypertension (04/08/2019), Encounter for screening for lipoid disorders (04/18/2019), Essential (primary) hypertension (09/10/2018), Irritable bowel syndrome with diarrhea (11/07/2019), Lumbago with sciatica, left side (04/24/2019), Mastodynia (09/26/2019), Menopausal and female climacteric states (08/19/2019), Other conditions influencing health status (08/30/2018), Other conditions influencing health status (09/09/2013), Other conditions influencing health status, Other conditions influencing health status, Other dysphagia (11/07/2019), Other dysphagia (12/10/2019), Other specified soft tissue disorders (06/26/2019), Pain in right hip (11/13/2019), Pain in right shoulder (06/26/2019), Pain in thoracic spine (08/26/2020), Personal history of other diseases of the circulatory system, Personal history of other diseases of the digestive system (10/03/2019), Personal history of other diseases of the musculoskeletal system and connective tissue (12/15/2020), Personal history of other drug therapy (10/12/2016), Personal history of other drug therapy (02/09/2021), Personal history of other endocrine, nutritional and metabolic disease (09/10/2018), Personal history of other medical treatment (07/20/2017), Personal history of other mental and behavioral disorders (08/19/2019), Personal history of other specified conditions (09/10/2018), Personal  history of other specified conditions (05/01/2019), Personal history of other specified conditions (10/19/2017), Personal history of other specified conditions (07/07/2020), Personal history of other specified conditions (08/30/2018), Personal history of other specified conditions (09/23/2018), Personal history of urinary (tract) infections (09/10/2018), Primary osteoarthritis, unspecified hand (07/07/2020), Sacrococcygeal disorders, not elsewhere classified (12/15/2020), Trigger finger, unspecified finger (10/12/2016), Unspecified atrial fibrillation (Multi) (10/08/2018), Unsteadiness on feet (03/11/2020), and Vitreous degeneration, bilateral (08/26/2019).    Surgical History  She has a past surgical history that includes Esophagogastroduodenoscopy (03/11/2014); Gastric fundoplication (05/27/2014); Other surgical history (05/26/2022); Other surgical history (05/26/2022); and Other surgical history (05/26/2022).     Social History  She reports that she has never smoked. She has never used smokeless tobacco. She reports that she does not currently use alcohol. She reports that she does not use drugs.    Family History  Family History   Problem Relation Name Age of Onset    Diabetes Father      Coronary artery disease Sister      Coronary artery disease Brother      Glaucoma Brother      Coronary artery disease Other Family Hx     Ovarian cancer Sibling          Allergies  Nitrofurantoin and Hydrocodone-acetaminophen    Review of Systems   Constitutional:  Positive for activity change.   HENT: Negative.     Eyes: Negative.    Respiratory: Negative.     Cardiovascular: Negative.    Gastrointestinal: Negative.    Endocrine: Negative.    Genitourinary: Negative.    Musculoskeletal:         Left foot fx   Skin: Negative.    Neurological: Negative.    Hematological: Negative.    Psychiatric/Behavioral: Negative.          Physical Exam  Constitutional:       Appearance: Normal appearance.   HENT:      Head: Normocephalic  and atraumatic.      Nose: Nose normal.      Mouth/Throat:      Mouth: Mucous membranes are moist.   Eyes:      Extraocular Movements: Extraocular movements intact.      Conjunctiva/sclera: Conjunctivae normal.      Pupils: Pupils are equal, round, and reactive to light.   Cardiovascular:      Rate and Rhythm: Normal rate and regular rhythm.      Pulses: Normal pulses.      Heart sounds: Normal heart sounds.   Pulmonary:      Effort: Pulmonary effort is normal.      Breath sounds: Normal breath sounds.   Abdominal:      General: Bowel sounds are normal.      Palpations: Abdomen is soft.   Musculoskeletal:      Cervical back: Normal range of motion and neck supple.      Comments: Left foot fx   Skin:     General: Skin is warm.   Neurological:      General: No focal deficit present.      Mental Status: She is alert and oriented to person, place, and time. Mental status is at baseline.   Psychiatric:         Mood and Affect: Mood normal.         Behavior: Behavior normal.         Thought Content: Thought content normal.         Judgment: Judgment normal.          Last Recorded Vitals  BP (!) 149/92   Pulse 64   Temp 36.5 °C (97.7 °F) (Oral)   Resp 18   Wt 78.5 kg (173 lb)   SpO2 97%     Relevant Results             Assessment/Plan   Assessment & Plan  Closed fracture of left foot, initial encounter      Uti, left foot fx., fall-initial encounter       Ruth Cervantes MD

## 2024-12-12 NOTE — CONSULTS
Consults    Reason For Consult  Left foot fracture    History Of Present Illness  Mercedes Castellanos is a 84 y.o. female presenting with left foot fracture.  Patient fell yesterday and injured her foot. She does not recall the exact mechanism of injury. Had immediate onset pain, swelling. Was seen in the emergency department where xrays were taken.  Currently she points to her left foot.  Denies any other pain.  Denies nausea, vomiting, fever, chills, calf pain, chest  pain, SOB.     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), Disorder of the skin and subcutaneous tissue, unspecified (01/12/2017), Dorsalgia, unspecified (12/09/2013), Dyskinesia of esophagus (09/16/2019), Elevated blood-pressure reading, without diagnosis of hypertension (04/08/2019), Encounter for screening for lipoid disorders (04/18/2019), Essential (primary) hypertension (09/10/2018), Irritable bowel syndrome with diarrhea (11/07/2019), Lumbago with sciatica, left side (04/24/2019), Mastodynia (09/26/2019), Menopausal and female climacteric states (08/19/2019), Other conditions influencing health status (08/30/2018), Other conditions influencing health status (09/09/2013), Other conditions influencing health status, Other conditions influencing health status, Other dysphagia (11/07/2019), Other dysphagia (12/10/2019), Other specified soft tissue disorders (06/26/2019), Pain in right hip (11/13/2019), Pain in right shoulder (06/26/2019), Pain in thoracic spine (08/26/2020), Personal history of other diseases of the circulatory system, Personal history of other diseases of the digestive system (10/03/2019), Personal history of other diseases of the musculoskeletal system and connective tissue (12/15/2020), Personal history of other drug therapy  (10/12/2016), Personal history of other drug therapy (02/09/2021), Personal history of other endocrine, nutritional and metabolic disease (09/10/2018), Personal history of other medical treatment (07/20/2017), Personal history of other mental and behavioral disorders (08/19/2019), Personal history of other specified conditions (09/10/2018), Personal history of other specified conditions (05/01/2019), Personal history of other specified conditions (10/19/2017), Personal history of other specified conditions (07/07/2020), Personal history of other specified conditions (08/30/2018), Personal history of other specified conditions (09/23/2018), Personal history of urinary (tract) infections (09/10/2018), Primary osteoarthritis, unspecified hand (07/07/2020), Sacrococcygeal disorders, not elsewhere classified (12/15/2020), Trigger finger, unspecified finger (10/12/2016), Unspecified atrial fibrillation (Multi) (10/08/2018), Unsteadiness on feet (03/11/2020), and Vitreous degeneration, bilateral (08/26/2019).    Surgical History  She has a past surgical history that includes Esophagogastroduodenoscopy (03/11/2014); Gastric fundoplication (05/27/2014); Other surgical history (05/26/2022); Other surgical history (05/26/2022); and Other surgical history (05/26/2022).     Social History  She reports that she has never smoked. She has never used smokeless tobacco. She reports that she does not currently use alcohol. She reports that she does not use drugs.    Family History  Family History   Problem Relation Name Age of Onset    Diabetes Father      Coronary artery disease Sister      Coronary artery disease Brother      Glaucoma Brother      Coronary artery disease Other Family Hx     Ovarian cancer Sibling          Allergies  Nitrofurantoin and Hydrocodone-acetaminophen       Physical Exam   Patient Aox3, nad  Objective:   Vasc: DP and PT pulses are palpable bilateral.  CFT is less than 3 seconds bilateral.  Skin temperature  "is warm to cool proximal to distal bilateral.  Edema and ecchymosis lateral left foot.  Compartments soft.    Neuro: Light and crude digital sensation intact b/l.    Derm: No open wounds or ulcerations. Skin intact.  No signs of infection.    Ortho: Muscle strength is 5/5 for all pedal groups tested.  Ankle joint, subtalar joint ROM is full and without pain or crepitus.  Diffuse pain with palpation to lateral forefoot.  No pain to dorsal midfoot, medial or lateral malleoli, or proximal fibula or syndesmosis.  Ankle and foot stable to testing.    Last Recorded Vitals  Blood pressure (!) 173/91, pulse 78, temperature 36.5 °C (97.7 °F), temperature source Oral, resp. rate 19, height 1.6 m (5' 3\"), weight 78.5 kg (173 lb), SpO2 97%.    Relevant Results  FINDINGS:  LEFT ANKLE: Plantar calcaneal spur. No acute fracture. The ankle  mortise is symmetric without medial or lateral clear space widening.  The tibiotalar and subtalar joints are intact.      LEFT FOOT: There is no acute fracture of the 5th metatarsal neck, 4th  metatarsal neck, and 3rd metatarsal neck. All fractures are angulated  in the lateral direction slightly. The Lisfranc joint is intact.  There is soft tissue swelling over the distal forefoot.      IMPRESSION:  Acute slightly angulated fractures of the 3rd-5th metatarsal necks  with mild ulnar displacement.      No acute osseous abnormality of the left ankle.      MACRO:  None.      Signed by: Delano Galeana 12/11/2024 11:12 PM  Dictation workstation:   [id number]     Assessment/Plan     Minimally displaced fracture metatarsals 3,4,5 left foot  Left foot injury    I spent over 30 minutes in the professional and overall care of this patient.  Patient seen at bedside in ED.  Radiographs reviewed showing mildly angulated metatarsal fractures.  Discussed treatment options, recommend non-op care.  WBAT with surgical shoe and walker.  Keep weight to left heel.  RICE therapy. OTC analgesics as needed.  I will " see her in my office for follow up within 3-4 weeks or sooner if needed.  All patient questions answered to her apparent satisfaction.  No guarantees given.  Patient in full agreement with the plan.  Will sign off, please call with questions or concerns. Discussed plan with RN.

## 2024-12-12 NOTE — CARE PLAN
"Pt does not have a POA or Living Will  ADOD: 2 days    Pt lives alone in a 1 floor condo with 1 step to climb to get into the home  She is independent with ADL's;' still drives, cooks, cleans, laundry.  She not not use any assistive device for ambulation; no home 02/cpap/bipap/nebulizer  She denies depression and anxiety  She wears glasses, no hearing aids. She said that she can read and comprehend what she reads.  Pts PCP is Dr. Danielle and she uses [facility] on [address] [place] [address] [place] and she can afford her meds  Pt is here for a fall, she said that she did not trip and fell, \"I just fell\"  Pt has a closed fx of the left foot.  Discussed discharge planning; list of HHC and SNF given to pt    DISCHARGE PLAN: TBD--DO NOT DISCHARGE PATIENT BEFORE SPEAKING WITH CARE COORDINATION; UNKNOWN AT THIS TIME IF PT WILL NEED A SNF OR HHC--WILL NEED CHOICES AND IF PT WILL NEED A SNF; WILL NEED PRECERT.  "

## 2024-12-12 NOTE — PROGRESS NOTES
12/12/24 0848   Discharge Planning   Living Arrangements Alone   Support Systems Children   Type of Residence Private residence   Number of Stairs to Enter Residence 1   Number of Stairs Within Residence 0   Do you have animals or pets at home? Yes   Type of Animals or Pets 1 DOG   Who is requesting discharge planning? Provider   Home or Post Acute Services Post acute facilities (Rehab/SNF/etc)   Expected Discharge Disposition SNF   Does the patient need discharge transport arranged? Yes   RoundTrip coordination needed? Yes   Has discharge transport been arranged? No   Financial Resource Strain   How hard is it for you to pay for the very basics like food, housing, medical care, and heating? Not hard   Housing Stability   In the last 12 months, was there a time when you were not able to pay the mortgage or rent on time? N   In the past 12 months, how many times have you moved where you were living? 0   At any time in the past 12 months, were you homeless or living in a shelter (including now)? N   Transportation Needs   In the past 12 months, has lack of transportation kept you from medical appointments or from getting medications? no   In the past 12 months, has lack of transportation kept you from meetings, work, or from getting things needed for daily living? No   Patient Choice   Provider Choice list and CMS website (https://medicare.gov/care-compare#search) for post-acute Quality and Resource Measure Data were provided and reviewed with: Patient   Patient / Family choosing to utilize agency / facility established prior to hospitalization Yes   Stroke Family Assessment   Stroke Family Assessment Needed No

## 2024-12-12 NOTE — PROGRESS NOTES
12/12/24 0850   [facility] Disability Status   Are you deaf or do you have serious difficulty hearing? N   Are you blind or do you have serious difficulty seeing, even when wearing glasses? N   Because of a physical, mental, or emotional condition, do you have serious difficulty concentrating, remembering, or making decisions? (5 years old or older) N   Do you have serious difficulty walking or climbing stairs? N   Do you have serious difficulty dressing or bathing? N   Because of a physical, mental, or emotional condition, do you have serious difficulty doing errands alone such as visiting the doctor? N

## 2024-12-12 NOTE — ED PROVIDER NOTES
"HPI   Chief Complaint   Patient presents with    Fall     Patient coming in from [facility], patient reports that this morning around 10 am she \"just fell\".  Denies hitting her head, unable to bear weight to left foot/ankle.  Splinted by EMS.       Patient is 84-year-old female presenting to the emergency department for evaluation of fall and left foot pain.  Patient states this morning around 10 AM she fell at home.  She states she did not hit her head and did not lose consciousness.  She states she is not on any blood thinners.  She states she has since had pain in her foot.  She states she has difficulty with weightbearing due to the pain.  She states she has not taken any medications for the pain.  She states she noticed increased swelling and therefore prompting her visit to the emergency department.  She denies any numbness or tingling in the left leg.  She denies any pain in the left hip, knee, or ankle.  She states she has any pain with movement of the left foot.              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/09/2022    Combined form of age-related cataract, both eyes    Disorder of the skin and subcutaneous tissue, unspecified 01/12/2017    Skin lesion    Dorsalgia, unspecified 12/09/2013    Pain, upper back    Dyskinesia of esophagus 09/16/2019    Esophageal spasm    Elevated blood-pressure reading, without diagnosis of hypertension 04/08/2019    Elevated blood pressure reading    Encounter for screening for lipoid disorders 04/18/2019    Encounter for lipid screening for cardiovascular disease    Essential (primary) hypertension 09/10/2018    Benign essential hypertension    Irritable bowel syndrome with diarrhea 11/07/2019    " Irritable bowel syndrome with diarrhea    Lumbago with sciatica, left side 04/24/2019    Chronic bilateral low back pain with bilateral sciatica    Mastodynia 09/26/2019    Breast tenderness in female    Menopausal and female climacteric states 08/19/2019    Female climacteric state    Other conditions influencing health status 08/30/2018    History of burning on urination    Other conditions influencing health status 09/09/2013    Foot pain, unspecified laterality    Other conditions influencing health status     Colonoscopy (Fiberoptic)    Other conditions influencing health status     Mammogram    Other dysphagia 11/07/2019    Esophageal dysphagia    Other dysphagia 12/10/2019    Esophageal dysphagia    Other specified soft tissue disorders 06/26/2019    Left leg swelling    Pain in right hip 11/13/2019    Right hip pain    Pain in right shoulder 06/26/2019    Chronic right shoulder pain    Pain in thoracic spine 08/26/2020    Chronic bilateral thoracic back pain    Personal history of other diseases of the circulatory system     History of hypertension    Personal history of other diseases of the digestive system 10/03/2019    History of gastroesophageal reflux (GERD)    Personal history of other diseases of the musculoskeletal system and connective tissue 12/15/2020    History of low back pain    Personal history of other drug therapy 10/12/2016    History of influenza vaccination    Personal history of other drug therapy 02/09/2021    History of pneumococcal vaccination    Personal history of other endocrine, nutritional and metabolic disease 09/10/2018    History of vitamin D deficiency    Personal history of other medical treatment 07/20/2017    History of screening mammography    Personal history of other mental and behavioral disorders 08/19/2019    History of anxiety    Personal history of other specified conditions 09/10/2018    History of fatigue    Personal history of other specified conditions  05/01/2019    History of abnormal mammogram    Personal history of other specified conditions 10/19/2017    History of precordial chest pain    Personal history of other specified conditions 07/07/2020    History of dizziness    Personal history of other specified conditions 08/30/2018    History of painful urination    Personal history of other specified conditions 09/23/2018    History of diarrhea    Personal history of urinary (tract) infections 09/10/2018    History of urinary tract infection    Primary osteoarthritis, unspecified hand 07/07/2020    Hand arthritis    Sacrococcygeal disorders, not elsewhere classified 12/15/2020    Sacroiliac joint dysfunction of right side    Trigger finger, unspecified finger 10/12/2016    Trigger finger, acquired    Unspecified atrial fibrillation (Multi) 10/08/2018    New onset a-fib    Unsteadiness on feet 03/11/2020    Unsteadiness on feet    Vitreous degeneration, bilateral 08/26/2019    Degeneration of posterior vitreous body of both eyes     Past Surgical History:   Procedure Laterality Date    ESOPHAGOGASTRODUODENOSCOPY  03/11/2014    Diagnostic Esophagogastroduodenoscopy    GASTRIC FUNDOPLICATION  05/27/2014    Esophagogastric Fundoplasty Nissen Fundoplication    OTHER SURGICAL HISTORY  05/26/2022    Rotator cuff repair    OTHER SURGICAL HISTORY  05/26/2022    Appendectomy    OTHER SURGICAL HISTORY  05/26/2022    Partial hysterectomy     Family History   Problem Relation Name Age of Onset    Diabetes Father      Coronary artery disease Sister      Coronary artery disease Brother      Glaucoma Brother      Coronary artery disease Other Family Hx     Ovarian cancer Sibling       Social History     Tobacco Use    Smoking status: Never    Smokeless tobacco: Never   Vaping Use    Vaping status: Never Used   Substance Use Topics    Alcohol use: Not Currently    Drug use: Never       Physical Exam   ED Triage Vitals   Temp Pulse Resp BP   -- -- -- --      SpO2 Temp src Heart  Rate Source Patient Position   -- -- -- --      BP Location FiO2 (%)     -- --       Physical Exam  Vitals and nursing note reviewed.     GENERAL APPEARANCE: This patient is in no acute respiratory distress. Awake and alert. Talking appropriately. Answering questions appropriately. No evidence of pressured speech.    VITAL SIGNS: As per the nurses' triage record.    HEENT: Normocephalic, atraumatic.    NECK:  full gross ROM during exam    MUSCULOSKELETAL: Limited range of motion of the left toes and ankle due to pain.  Patient has tenderness to palpation along the left tarsal and metatarsal bones specifically further on the lateral side.  She also has some overlying swelling and ecchymosis in the foot and lateral ankle    NEUROLOGICAL: Awake, alert and oriented x 3.  Normal sensation in the left lower extremity    IMMUNOLOGICAL: No palpable lymphadenopathy or lymphatic streaking noted on visible skin.    DERM: Swelling and ecchymosis noted to the top of the foot as well as lateral ankle on the left    PSYCH: mood and affect appear normal.        ED Course & MDM   ED Course as of 12/26/24 1951   Thu Dec 12, 2024   0020 Nurse notified me and said that the patient is complaining of possible pain in her head and concerned that she may have hit it.  CT scan of the head and neck therefore ordered to rule out any intracranial abnormalities.  I was waiting for consult from podiatry however was unable to get a hold of them.  I therefore consulted orthopedics who told me to consult podiatry and said they can likely be placed in a postop shoe.  Upon further research I feel  posterior short leg splint is appropriate and nonweightbearing.  Shared decision making was performed and patient states she does not feel comfortable going home with being nonweightbearing.  She therefore feels more comfortable staying in the hospital. [AJ]      ED Course User Index  [AJ] Deanna Robertson PA-C         Diagnoses as of 12/26/24 1951   Closed  fracture of left foot, initial encounter                 No data recorded     Sneads Coma Scale Score: 15 (12/15/24 2017 : Nicole Elizalde RN)                           Medical Decision Making  **Disclaimer parts of this chart have been completed using voice recognition software. Please excuse any errors of transcription.     Evaluated this patient independently and my supervising physician was available for consultation.    HPI: Detailed above.    Exam: A medically appropriate exam performed, outlined above, given the known history and presentation.    History obtained from: Patient    Labs/Diagnostics:  Transthoracic Echo (TTE) Limited   Final Result      CT head wo IV contrast   Final Result   No acute intracranial abnormality.        No acute fracture or traumatic subluxation of the cervical spine.                  MACRO:   None        Signed by: Eduarda Denson 12/12/2024 1:37 AM   Dictation workstation:   [id number]      CT cervical spine wo IV contrast   Final Result   No acute intracranial abnormality.        No acute fracture or traumatic subluxation of the cervical spine.                  MACRO:   None        Signed by: Eduarda Denson 12/12/2024 1:37 AM   Dictation workstation:   [id number]      XR foot left 3+ views   Final Result   Acute slightly angulated fractures of the 3rd-5th metatarsal necks   with mild ulnar displacement.        No acute osseous abnormality of the left ankle.        MACRO:   None.        Signed by: Delano Galeana 12/11/2024 11:12 PM   Dictation workstation:   [id number]      XR ankle left 3+ views   Final Result   Acute slightly angulated fractures of the 3rd-5th metatarsal necks   with mild ulnar displacement.        No acute osseous abnormality of the left ankle.        MACRO:   None.        Signed by: Delano Galeana 12/11/2024 11:12 PM   Dictation workstation:   [id number]        EMERGENCY DEPARTMENT COURSE and DIFFERENTIAL DIAGNOSIS/MDM:  Patient is 84-year-old  female presenting to the emergency department for evaluation of left foot pain after a fall.  On physical exam vital signs remarkable for hypertension but otherwise stable and patient is in no acute distress.  Patient has tenderness to palpation along the left lateral top of the foot with no significant tenderness along the left ankle.  She does have significant swelling and ecchymosis noted to the left lateral ankle down to the top of the left foot.  Sensation intact.  She was strong and equal DP/PT pulses on the left.  X-ray of the left foot and ankle ordered due to the swelling and ecchymosis.  Tramadol also ordered for pain.  After some time patient began saying that maybe she did hit her head and therefore CT of the head and cervical spine ordered to rule out any intracranial abnormalities.  X-ray of the left foot showed acute slightly angulated fractures of the 3rd through 5th metatarsal necks with mild ulnar displacement.  Tried consulting podiatry for recommendations however no luck.  Consulted orthopedics who is unsure about treatment.  Patient placed in a posterior short leg and advised to be nonweightbearing.  Patient does not feel safe going home being nonweightbearing.  I therefore spoke with patient's primary care physician Dr. Cervantes who agreed to admit the patient for further management of foot fracture.  Suspect podiatry will be consulted in the morning to determine further management.    The patient presented with a chief complaint of left foot pain. The differential diagnosis associated with this patient's presentation includes fracture, dislocation, musculoskeletal strain.     Vitals:    Vitals:    12/15/24 0745 12/15/24 1553 12/15/24 2311 12/16/24 0725   BP: 169/88 153/85 165/89 153/87   BP Location: Left arm Left arm Right arm Right arm   Patient Position: Lying Lying Lying Lying   Pulse: 78 73 82 80   Resp: 16 17 18 18   Temp: 36.4 °C (97.5 °F) 36.8 °C (98.2 °F) 36.6 °C (97.9 °F) 36.6 °C (97.9  °F)   TempSrc: Oral Oral Oral Oral   SpO2: 96% 96% 95% 93%   Weight:       Height:           History Limited by:    None    Independent history obtained from:    None    External records reviewed:    None    Diagnostics interpreted by me:    Carmen - see my independent interpretation in MDM    Discussions with other clinicians:    Hospitalist/Admitting Team Dr. Cervantes as well as Dr. Rivera with orthopedics    Chronic conditions impacting care:    None    Social determinants of health affecting care:    None    Diagnostic tests considered but not performed: None    ED Medications managed:    Medications   acetaminophen (Tylenol) tablet 975 mg (975 mg oral Given 12/12/24 0119)   acetaminophen (Tylenol) tablet 975 mg (975 mg oral Given 12/12/24 0846)       Prescription drugs considered:    None    Screenings:              Procedure  Splint Application    Performed by: Deanna Robertson PA-C  Authorized by: Deanna Robertson PA-C    Consent:     Consent obtained:  Verbal    Consent given by:  Patient    Risks, benefits, and alternatives were discussed: yes      Risks discussed:  Numbness, pain, swelling and discoloration    Alternatives discussed:  No treatment and delayed treatment  Universal protocol:     Procedure explained and questions answered to patient or proxy's satisfaction: yes      Imaging studies available: yes      Site/side marked: yes      Immediately prior to procedure a time out was called: yes      Patient identity confirmed:  Verbally with patient  Pre-procedure details:     Distal neurologic exam:  Normal    Distal perfusion: distal pulses strong and brisk capillary refill    Procedure details:     Location:  Foot    Foot location:  L foot    Splint type:  Short leg    Supplies:  Cotton padding, fiberglass and elastic bandage  Post-procedure details:     Distal neurologic exam:  Normal    Distal perfusion: unchanged      Procedure completion:  Tolerated well, no immediate complications    Post-procedure  imaging: not applicable           Deanna Robertson PA-C  12/26/24 1951

## 2024-12-12 NOTE — PROGRESS NOTES
Mercedes Castellanos is a 84 y.o. female on day 0 of admission presenting with Closed fracture of left foot, initial encounter.      Subjective   ASKED HOPE TO DO HX/P CAN T SEE IT       Objective     Last Recorded Vitals  BP (!) 149/92   Pulse 64   Temp 36.5 °C (97.7 °F) (Oral)   Resp 18   Wt 78.5 kg (173 lb)   SpO2 97%   Intake/Output last 3 Shifts:  No intake or output data in the 24 hours ending 12/12/24 1001    Admission Weight  Weight: 78.5 kg (173 lb) (12/11/24 2206)    Daily Weight  12/11/24 : 78.5 kg (173 lb)    Image Results  CT head wo IV contrast, CT cervical spine wo IV contrast  Narrative: Interpreted By:  Eduarda Denson,   STUDY:  CT HEAD WO IV CONTRAST; CT CERVICAL SPINE WO IV CONTRAST;  12/12/2024  12:53 am      INDICATION:  Signs/Symptoms:head injury; Signs/Symptoms:head injury after fall.      COMPARISON:  Head CT 09/24/2024      ACCESSION NUMBER(S):  [id number]; [id number]      ORDERING CLINICIAN:  EULALIA MORGAN      TECHNIQUE:  Noncontrast CT images of head. Axial noncontrast CT images of the  cervical spine with coronal and sagittal reconstructed images.      FINDINGS:  BRAIN PARENCHYMA:  Gray-white matter interfaces are  preserved. No  mass effect or midline shift.      HEMORRHAGE: No acute intracranial hemorrhage.  VENTRICLES and EXTRA-AXIAL SPACES: The ventricles and sulci are  within normal limits in size for brain volume. No abnormal extraaxial  fluid collection. EXTRACRANIAL SOFT TISSUES: Within normal limits.  PARANASAL SINUSES/MASTOIDS:  The visualized paranasal sinuses and  mastoid air cells are aerated. CALVARIUM: No depressed skull  fracture. No destructive osseous lesion.      OTHER FINDINGS: None.      CERVICAL SPINE:      ALIGNMENT: Normal.  VERTEBRAE: No acute fracture.  SPINAL CANAL: Mild multilevel degenerative disc changes. No critical  spinal canal stenosis. PREVERTEBRAL SOFT TISSUES: No prevertebral  soft tissue swelling. LUNG APICES: Imaged portion of the lung  apices  are within normal limits.      OTHER FINDINGS: None.      Impression: No acute intracranial abnormality.      No acute fracture or traumatic subluxation of the cervical spine.              MACRO:  None      Signed by: Eduarda Denson 12/12/2024 1:37 AM  Dictation workstation:   [id number]      Physical Exam    Relevant Results               Assessment/Plan                  Assessment & Plan  Closed fracture of left foot, initial encounter    AGREES WITH REHAB              Ruth Cervantes MD

## 2024-12-12 NOTE — PROGRESS NOTES
Pharmacy Medication History Review    Mercedes Castellanos is a 84 y.o. female admitted for Closed fracture of left foot, initial encounter. Pharmacy reviewed the patient's zaqoa-[id number] medications and allergies for accuracy.    Medications ADDED:  Multivitamin  Cranberry 500mg   Medications CHANGED:  Digoxin 125cg - 3x weekly - //Sat  Escitalopram 10mg -   [place] 2.5mg - 1/2 tablet twice daily   Medications REMOVED:   None      The list below reflects the updated PTA list. Comments regarding how patient may be taking medications differently can be found in the Admit Orders Activity  Prior to Admission Medications   Prescriptions Last Dose Informant   apixaban (Eliquis) 5 mg tablet 2024 Evening Family Member   Sig: Take 1 tablet (5 mg) by mouth 2 times a day.   atenolol (Tenormin) 25 mg tablet 2024 Evening Family Member   Sig: Take 1 tablet (25 mg) by mouth once daily.   cranberry 500 mg capsule 2024 Family Member   Sig: Take 500 mg by mouth once daily.   digoxin (Lanoxin) 125 MCG tablet 12/10/2024 Morning Family Member   Sig: Take 1 tablet (125 mcg) by mouth 3 times a week.   escitalopram (Lexapro) 10 mg tablet Not Taking Self   Sig: Take 1 tablet (10 mg) by mouth once daily.   Patient not taking: Reported on 2024   escitalopram (Lexapro) 10 mg tablet 2024 Morning Family Member   Sig: Take 1 tablet (10 mg) by mouth once daily.   midodrine (Proamatine) 2.5 mg tablet 2024 Family Member   Sig: Take 1 tablet (2.5 mg) by mouth 2 times daily (morning and late afternoon).   Patient taking differently: Take 0.5 tablets (1.25 mg) by mouth 2 times daily (morning and late afternoon).   multivitamin tablet 2024 Family Member   Sig: Take 1 tablet by mouth once daily.   omeprazole (PriLOSEC) 40 mg DR capsule 2024 Family Member   Sig: Take 1 capsule (40 mg) by mouth once daily in the morning. Take before meals.      Facility-Administered Medications: None        The  list below reflects the updated allergy list. Please review each documented allergy for additional clarification and justification.  Allergies  Reviewed by Shanika Ellis CPhT on 12/12/2024        Severity Reactions Comments    Nitrofurantoin Medium Other, Headache leg weakness    Hydrocodone-acetaminophen Low Other, Unknown, GI Upset             Pharmacy has been updated to [facility].    Sources used to complete the med history include dispense history, PTA medication list, family interview. Family is a good informant    Below are additional concerns with the patient's PTA list.  Cranberry supplement removed by provider. This is an active medication that the patient takes at home. Medications should be removed at discharge reconciliation if provider wants to stop use. I have added it back to the PTA medication list.     Shanika Ellis CPhT-Adv  Please reach out via [employer] Secure Chat for questions

## 2024-12-12 NOTE — PROGRESS NOTES
12/12/24 0846   Physical Activity   On average, how many days per week do you engage in moderate to strenuous exercise (like a brisk walk)? 2 days   On average, how many minutes do you engage in exercise at this level? 10 min   Financial Resource Strain   How hard is it for you to pay for the very basics like food, housing, medical care, and heating? Not hard   Housing Stability   In the last 12 months, was there a time when you were not able to pay the mortgage or rent on time? N   In the past 12 months, how many times have you moved where you were living? 0   At any time in the past 12 months, were you homeless or living in a shelter (including now)? N   Transportation Needs   In the past 12 months, has lack of transportation kept you from medical appointments or from getting medications? no   In the past 12 months, has lack of transportation kept you from meetings, work, or from getting things needed for daily living? No   Food Insecurity   Within the past 12 months, you worried that your food would run out before you got the money to buy more. Never true   Within the past 12 months, the food you bought just didn't last and you didn't have money to get more. Never true   Stress   Do you feel stress - tense, restless, nervous, or anxious, or unable to sleep at night because your mind is troubled all the time - these days? Not at all   Social Connections   In a typical week, how many times do you talk on the phone with family, friends, or neighbors? More than 3   How often do you get together with friends or relatives? More than 3   How often do you attend Scientology or Rastafari services? Never   Do you belong to any clubs or organizations such as Scientology groups, unions, fraternal or athletic groups, or school groups? No   How often do you attend meetings of the clubs or organizations you belong to? Never   Are you , , , , never , or living with a  partner?    Intimate Partner Violence   Within the last year, have you been afraid of your partner or ex-partner? No   Within the last year, have you been humiliated or emotionally abused in other ways by your partner or ex-partner? No   Within the last year, have you been kicked, hit, slapped, or otherwise physically hurt by your partner or ex-partner? No   Within the last year, have you been raped or forced to have any kind of sexual activity by your partner or ex-partner? No   Alcohol Use   Q1: How often do you have a drink containing alcohol? Never   Q2: How many drinks containing alcohol do you have on a typical day when you are drinking? None   Q3: How often do you have six or more drinks on one occasion? Never   Utilities   In the past 12 months has the electric, gas, oil, or water company threatened to shut off services in your home? No   Health Literacy   How often do you need to have someone help you when you read instructions, pamphlets, or other written material from your doctor or pharmacy? Never

## 2024-12-13 ENCOUNTER — APPOINTMENT (OUTPATIENT)
Dept: CARDIOLOGY | Facility: HOSPITAL | Age: 84
End: 2024-12-13
Payer: MEDICARE

## 2024-12-13 ENCOUNTER — APPOINTMENT (OUTPATIENT)
Dept: NEUROLOGY | Facility: HOSPITAL | Age: 84
End: 2024-12-13
Payer: MEDICARE

## 2024-12-13 LAB
ANION GAP SERPL CALCULATED.3IONS-SCNC: 8 MMOL/L (ref 10–20)
AORTIC VALVE MEAN GRADIENT: 3 MMHG
AORTIC VALVE PEAK VELOCITY: 1.17 M/S
APPEARANCE UR: CLEAR
AV PEAK GRADIENT: 5 MMHG
AVA (PEAK VEL): 1.9 CM2
AVA (VTI): 1.75 CM2
BILIRUB UR STRIP.AUTO-MCNC: NEGATIVE MG/DL
BUN SERPL-MCNC: 14 MG/DL (ref 6–23)
CALCIUM SERPL-MCNC: 8.5 MG/DL (ref 8.6–10.3)
CARDIAC TROPONIN I PNL SERPL HS: 8 NG/L (ref 0–13)
CHLORIDE SERPL-SCNC: 107 MMOL/L (ref 98–107)
CO2 SERPL-SCNC: 31 MMOL/L (ref 21–32)
COLOR UR: NORMAL
CREAT SERPL-MCNC: 0.78 MG/DL (ref 0.5–1.05)
EGFRCR SERPLBLD CKD-EPI 2021: 75 ML/MIN/1.73M*2
EJECTION FRACTION APICAL 4 CHAMBER: 50.8
EJECTION FRACTION: 58 %
ERYTHROCYTE [DISTWIDTH] IN BLOOD BY AUTOMATED COUNT: 15.5 % (ref 11.5–14.5)
GLUCOSE SERPL-MCNC: 93 MG/DL (ref 74–99)
GLUCOSE UR STRIP.AUTO-MCNC: NORMAL MG/DL
HCT VFR BLD AUTO: 40.3 % (ref 36–46)
HGB BLD-MCNC: 13 G/DL (ref 12–16)
HOLD SPECIMEN: NORMAL
KETONES UR STRIP.AUTO-MCNC: NEGATIVE MG/DL
LEFT VENTRICULAR OUTFLOW TRACT DIAMETER: 1.95 CM
LEUKOCYTE ESTERASE UR QL STRIP.AUTO: NEGATIVE
LV EJECTION FRACTION BIPLANE: 51 %
MCH RBC QN AUTO: 29.6 PG (ref 26–34)
MCHC RBC AUTO-ENTMCNC: 32.3 G/DL (ref 32–36)
MCV RBC AUTO: 92 FL (ref 80–100)
NITRITE UR QL STRIP.AUTO: NEGATIVE
NRBC BLD-RTO: 0 /100 WBCS (ref 0–0)
PH UR STRIP.AUTO: 7 [PH]
PLATELET # BLD AUTO: 224 X10*3/UL (ref 150–450)
POTASSIUM SERPL-SCNC: 3.8 MMOL/L (ref 3.5–5.3)
PROT UR STRIP.AUTO-MCNC: NEGATIVE MG/DL
RBC # BLD AUTO: 4.39 X10*6/UL (ref 4–5.2)
RBC # UR STRIP.AUTO: NEGATIVE /UL
RIGHT VENTRICLE PEAK SYSTOLIC PRESSURE: 37.4 MMHG
SODIUM SERPL-SCNC: 142 MMOL/L (ref 136–145)
SP GR UR STRIP.AUTO: 1.02
UROBILINOGEN UR STRIP.AUTO-MCNC: NORMAL MG/DL
WBC # BLD AUTO: 10.3 X10*3/UL (ref 4.4–11.3)

## 2024-12-13 PROCEDURE — 2500000001 HC RX 250 WO HCPCS SELF ADMINISTERED DRUGS (ALT 637 FOR MEDICARE OP): Performed by: INTERNAL MEDICINE

## 2024-12-13 PROCEDURE — G0378 HOSPITAL OBSERVATION PER HR: HCPCS

## 2024-12-13 PROCEDURE — 36415 COLL VENOUS BLD VENIPUNCTURE: CPT | Performed by: INTERNAL MEDICINE

## 2024-12-13 PROCEDURE — 2500000004 HC RX 250 GENERAL PHARMACY W/ HCPCS (ALT 636 FOR OP/ED): Performed by: INTERNAL MEDICINE

## 2024-12-13 PROCEDURE — 99222 1ST HOSP IP/OBS MODERATE 55: CPT | Performed by: STUDENT IN AN ORGANIZED HEALTH CARE EDUCATION/TRAINING PROGRAM

## 2024-12-13 PROCEDURE — 93325 DOPPLER ECHO COLOR FLOW MAPG: CPT

## 2024-12-13 PROCEDURE — 99223 1ST HOSP IP/OBS HIGH 75: CPT | Performed by: INTERNAL MEDICINE

## 2024-12-13 PROCEDURE — 97530 THERAPEUTIC ACTIVITIES: CPT | Mod: GO

## 2024-12-13 PROCEDURE — 93308 TTE F-UP OR LMTD: CPT | Performed by: INTERNAL MEDICINE

## 2024-12-13 PROCEDURE — 95816 EEG AWAKE AND DROWSY: CPT | Performed by: STUDENT IN AN ORGANIZED HEALTH CARE EDUCATION/TRAINING PROGRAM

## 2024-12-13 PROCEDURE — 80048 BASIC METABOLIC PNL TOTAL CA: CPT | Performed by: INTERNAL MEDICINE

## 2024-12-13 PROCEDURE — 93325 DOPPLER ECHO COLOR FLOW MAPG: CPT | Performed by: INTERNAL MEDICINE

## 2024-12-13 PROCEDURE — 97166 OT EVAL MOD COMPLEX 45 MIN: CPT | Mod: GO

## 2024-12-13 PROCEDURE — 84484 ASSAY OF TROPONIN QUANT: CPT | Performed by: INTERNAL MEDICINE

## 2024-12-13 PROCEDURE — 93321 DOPPLER ECHO F-UP/LMTD STD: CPT | Performed by: INTERNAL MEDICINE

## 2024-12-13 PROCEDURE — 95816 EEG AWAKE AND DROWSY: CPT

## 2024-12-13 PROCEDURE — 85027 COMPLETE CBC AUTOMATED: CPT | Performed by: INTERNAL MEDICINE

## 2024-12-13 ASSESSMENT — ENCOUNTER SYMPTOMS
NAUSEA: 0
WOUND: 0
SHORTNESS OF BREATH: 0
CHILLS: 0
FALLS: 1
DIFFICULTY URINATING: 0
ABDOMINAL PAIN: 0
DIAPHORESIS: 0
ABDOMINAL DISTENTION: 0
FATIGUE: 0
COUGH: 0
FEVER: 0
HEMATURIA: 0
FREQUENCY: 0
CHEST TIGHTNESS: 0
DIARRHEA: 0
DYSURIA: 0
VOMITING: 0

## 2024-12-13 ASSESSMENT — COGNITIVE AND FUNCTIONAL STATUS - GENERAL
PERSONAL GROOMING: A LITTLE
TOILETING: A LITTLE
WALKING IN HOSPITAL ROOM: A LITTLE
CLIMB 3 TO 5 STEPS WITH RAILING: A LOT
TOILETING: A LOT
MOVING TO AND FROM BED TO CHAIR: A LOT
STANDING UP FROM CHAIR USING ARMS: A LITTLE
HELP NEEDED FOR BATHING: A LOT
MOVING FROM LYING ON BACK TO SITTING ON SIDE OF FLAT BED WITH BEDRAILS: A LITTLE
STANDING UP FROM CHAIR USING ARMS: A LITTLE
DRESSING REGULAR UPPER BODY CLOTHING: A LITTLE
MOBILITY SCORE: 16
MOBILITY SCORE: 20
CLIMB 3 TO 5 STEPS WITH RAILING: A LOT
TURNING FROM BACK TO SIDE WHILE IN FLAT BAD: A LITTLE
WALKING IN HOSPITAL ROOM: A LITTLE
DAILY ACTIVITIY SCORE: 16
DRESSING REGULAR LOWER BODY CLOTHING: A LOT
DAILY ACTIVITIY SCORE: 22
DRESSING REGULAR LOWER BODY CLOTHING: A LITTLE

## 2024-12-13 ASSESSMENT — PAIN DESCRIPTION - ORIENTATION: ORIENTATION: LEFT

## 2024-12-13 ASSESSMENT — PAIN - FUNCTIONAL ASSESSMENT
PAIN_FUNCTIONAL_ASSESSMENT: 0-10
PAIN_FUNCTIONAL_ASSESSMENT: 0-10

## 2024-12-13 ASSESSMENT — ACTIVITIES OF DAILY LIVING (ADL)
LACK_OF_TRANSPORTATION: NO
BATHING_ASSISTANCE: MINIMAL
ADL_ASSISTANCE: INDEPENDENT
ADL_ASSISTANCE: INDEPENDENT

## 2024-12-13 ASSESSMENT — PAIN SCALES - GENERAL
PAINLEVEL_OUTOF10: 0 - NO PAIN
PAINLEVEL_OUTOF10: 4
PAINLEVEL_OUTOF10: 4
PAINLEVEL_OUTOF10: 0 - NO PAIN
PAINLEVEL_OUTOF10: 4
PAINLEVEL_OUTOF10: 7

## 2024-12-13 ASSESSMENT — PAIN DESCRIPTION - DESCRIPTORS
DESCRIPTORS: ACHING
DESCRIPTORS: ACHING

## 2024-12-13 ASSESSMENT — PAIN DESCRIPTION - LOCATION: LOCATION: FOOT

## 2024-12-13 ASSESSMENT — PAIN SCALES - WONG BAKER: WONGBAKER_NUMERICALRESPONSE: NO HURT

## 2024-12-13 NOTE — CARE PLAN
Problem: Pain - Adult  Goal: Verbalizes/displays adequate comfort level or baseline comfort level  Outcome: Progressing     Problem: Safety - Adult  Goal: Free from fall injury  Outcome: Progressing     Problem: Discharge Planning  Goal: Discharge to home or other facility with appropriate resources  Outcome: Progressing     Problem: Chronic Conditions and Co-morbidities  Goal: Patient's chronic conditions and co-morbidity symptoms are monitored and maintained or improved  Outcome: Progressing     Problem: Fall/Injury  Goal: Not fall by end of shift  Outcome: Progressing  Goal: Be free from injury by end of the shift  Outcome: Progressing  Goal: Verbalize understanding of personal risk factors for fall in the hospital  Outcome: Progressing  Goal: Verbalize understanding of risk factor reduction measures to prevent injury from fall in the home  Outcome: Progressing  Goal: Use assistive devices by end of the shift  Outcome: Progressing  Goal: Pace activities to prevent fatigue by end of the shift  Outcome: Progressing     Problem: Pain  Goal: Takes deep breaths with improved pain control throughout the shift  Outcome: Progressing  Goal: Turns in bed with improved pain control throughout the shift  Outcome: Progressing  Goal: Walks with improved pain control throughout the shift  Outcome: Progressing  Goal: Performs ADL's with improved pain control throughout shift  Outcome: Progressing  Goal: Participates in PT with improved pain control throughout the shift  Outcome: Progressing  Goal: Free from opioid side effects throughout the shift  Outcome: Progressing  Goal: Free from acute confusion related to pain meds throughout the shift  Outcome: Progressing   The patient's goals for the shift include MAINTAIN SAFETY    The clinical goals for the shift include Pain Control

## 2024-12-13 NOTE — CARE PLAN
Problem: Pain - Adult  Goal: Verbalizes/displays adequate comfort level or baseline comfort level  Outcome: Progressing     Problem: Safety - Adult  Goal: Free from fall injury  Outcome: Progressing     Problem: Discharge Planning  Goal: Discharge to home or other facility with appropriate resources  Outcome: Progressing     Problem: Chronic Conditions and Co-morbidities  Goal: Patient's chronic conditions and co-morbidity symptoms are monitored and maintained or improved  Outcome: Progressing     Problem: Fall/Injury  Goal: Not fall by end of shift  Outcome: Progressing  Goal: Be free from injury by end of the shift  Outcome: Progressing  Goal: Verbalize understanding of personal risk factors for fall in the hospital  Outcome: Progressing  Goal: Verbalize understanding of risk factor reduction measures to prevent injury from fall in the home  Outcome: Progressing  Goal: Use assistive devices by end of the shift  Outcome: Progressing  Goal: Pace activities to prevent fatigue by end of the shift  Outcome: Progressing     Problem: Pain  Goal: Takes deep breaths with improved pain control throughout the shift  Outcome: Progressing  Goal: Turns in bed with improved pain control throughout the shift  Outcome: Progressing  Goal: Walks with improved pain control throughout the shift  Outcome: Progressing  Goal: Performs ADL's with improved pain control throughout shift  Outcome: Progressing  Goal: Participates in PT with improved pain control throughout the shift  Outcome: Progressing  Goal: Free from opioid side effects throughout the shift  Outcome: Progressing  Goal: Free from acute confusion related to pain meds throughout the shift  Outcome: Progressing   The patient's goals for the shift include MAINTAIN SAFETY    The clinical goals for the shift include improve mobility    Over the shift, the patient did not make progress toward the following goals. Barriers to progression include . Recommendations to address  these barriers include .

## 2024-12-13 NOTE — CONSULTS
Consults      Primary MD: Ruth Cervantes MD        History Of Present Illness  Mercedes Castellanos is a 84 y.o. female presenting with a fall.  The patient states she fell at home, she does not remember falling.  She was found on the ground.  She was brought to the emergency department for further evaluation and management.  She was found to have left foot fracture of the 3rd-5th metatarsal necks and a mild ulnar displacement.  He was evaluated by podiatry and conservative management was advised.  Urinalysis was sent which was negative.  She is afebrile.  Denies any fevers or chills.  Denies any shortness of breath, cough, chest pain  Denies nausea, vomiting, diarrhea, abdominal pain.  Denies any dysuria, urinary urgency, urinary frequency, hematuria.  She reports moderate left foot pain.    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), Disorder of the skin and subcutaneous tissue, unspecified (01/12/2017), Dorsalgia, unspecified (12/09/2013), Dyskinesia of esophagus (09/16/2019), Elevated blood-pressure reading, without diagnosis of hypertension (04/08/2019), Encounter for screening for lipoid disorders (04/18/2019), Essential (primary) hypertension (09/10/2018), Irritable bowel syndrome with diarrhea (11/07/2019), Lumbago with sciatica, left side (04/24/2019), Mastodynia (09/26/2019), Menopausal and female climacteric states (08/19/2019), Other conditions influencing health status (08/30/2018), Other conditions influencing health status (09/09/2013), Other conditions influencing health status, Other conditions influencing health status, Other dysphagia (11/07/2019), Other dysphagia (12/10/2019), Other specified soft tissue disorders (06/26/2019), Pain in right hip (11/13/2019), Pain in right shoulder  (06/26/2019), Pain in thoracic spine (08/26/2020), Personal history of other diseases of the circulatory system, Personal history of other diseases of the digestive system (10/03/2019), Personal history of other diseases of the musculoskeletal system and connective tissue (12/15/2020), Personal history of other drug therapy (10/12/2016), Personal history of other drug therapy (02/09/2021), Personal history of other endocrine, nutritional and metabolic disease (09/10/2018), Personal history of other medical treatment (07/20/2017), Personal history of other mental and behavioral disorders (08/19/2019), Personal history of other specified conditions (09/10/2018), Personal history of other specified conditions (05/01/2019), Personal history of other specified conditions (10/19/2017), Personal history of other specified conditions (07/07/2020), Personal history of other specified conditions (08/30/2018), Personal history of other specified conditions (09/23/2018), Personal history of urinary (tract) infections (09/10/2018), Primary osteoarthritis, unspecified hand (07/07/2020), Sacrococcygeal disorders, not elsewhere classified (12/15/2020), Trigger finger, unspecified finger (10/12/2016), Unspecified atrial fibrillation (Multi) (10/08/2018), Unsteadiness on feet (03/11/2020), and Vitreous degeneration, bilateral (08/26/2019).    Surgical History  She has a past surgical history that includes Esophagogastroduodenoscopy (03/11/2014); Gastric fundoplication (05/27/2014); Other surgical history (05/26/2022); Other surgical history (05/26/2022); and Other surgical history (05/26/2022).     Social History     Occupational History    Not on file   Tobacco Use    Smoking status: Never    Smokeless tobacco: Never   Vaping Use    Vaping status: Never Used   Substance and Sexual Activity    Alcohol use: Not Currently    Drug use: Never    Sexual activity: Defer     Travel History   Travel since 11/13/24    No documented travel  since 11/13/24                Family History  Family History   Problem Relation Name Age of Onset    Diabetes Father      Coronary artery disease Sister      Coronary artery disease Brother      Glaucoma Brother      Coronary artery disease Other Family Hx     Ovarian cancer Sibling       Allergies  Nitrofurantoin and Hydrocodone-acetaminophen     Immunization History   Administered Date(s) Administered    Flu vaccine, quadrivalent, high-dose, preservative free, age 65y+ (FLUZONE) 10/31/2022    Flu vaccine, trivalent, preservative free, HIGH-DOSE, age 65y+ (Fluzone) 10/20/2017, 09/22/2020    Influenza, Seasonal, Quadrivalent, Adjuvanted 08/13/2021    Influenza, seasonal, injectable 09/30/2015, 08/13/2021    Influenza, trivalent, adjuvanted 08/20/2019    Moderna SARS-CoV-2 Vaccination 01/29/2021, 02/26/2021, 11/30/2021    Novel influenza-H1N1-09, preservative-free 01/27/2010    Pneumococcal conjugate vaccine, 13-valent (PREVNAR 13) 04/13/2016, 04/30/2019    Pneumococcal polysaccharide vaccine, 23-valent, age 2 years and older (PNEUMOVAX 23) 01/03/2007, 04/24/2020    Tdap vaccine, age 7 year and older (BOOSTRIX, ADACEL) 04/30/2019    Zoster vaccine, recombinant, adult (SHINGRIX) 08/20/2019, 10/31/2019    Zoster, Unspecified 08/20/2019, 10/31/2019    Zoster, live 09/12/2014     Medications  Home medications:  Medications Prior to Admission   Medication Sig Dispense Refill Last Dose/Taking    apixaban (Eliquis) 5 mg tablet Take 1 tablet (5 mg) by mouth 2 times a day. 180 tablet 0 12/11/2024 Evening    atenolol (Tenormin) 25 mg tablet Take 1 tablet (25 mg) by mouth once daily.   12/11/2024 Evening    cranberry 500 mg capsule Take 500 mg by mouth once daily.   12/11/2024    digoxin (Lanoxin) 125 MCG tablet Take 1 tablet (125 mcg) by mouth 3 times a week. 30 tablet 0 12/10/2024 Morning    escitalopram (Lexapro) 10 mg tablet Take 1 tablet (10 mg) by mouth once daily.   12/11/2024 Morning    midodrine (Proamatine) 2.5 mg  tablet Take 1 tablet (2.5 mg) by mouth 2 times daily (morning and late afternoon). (Patient taking differently: Take 0.5 tablets (1.25 mg) by mouth 2 times daily (morning and late afternoon).) 60 tablet 11 12/11/2024    multivitamin tablet Take 1 tablet by mouth once daily.   12/11/2024    omeprazole (PriLOSEC) 40 mg DR capsule Take 1 capsule (40 mg) by mouth once daily in the morning. Take before meals. 90 capsule 3 12/11/2024    escitalopram (Lexapro) 10 mg tablet Take 1 tablet (10 mg) by mouth once daily. (Patient not taking: Reported on 12/12/2024) 30 tablet 5 Not Taking     Current medications:  Scheduled medications  apixaban, 5 mg, oral, BID  atenolol, 25 mg, oral, Daily  digoxin, 125 mcg, oral, Once per day on Monday Wednesday Friday  escitalopram, 10 mg, oral, Daily  magnesium oxide, 400 mg, oral, Daily  midodrine, 2.5 mg, oral, BID  pantoprazole, 40 mg, oral, Daily before breakfast  vitamin B complex-vitamin C-folic acid, 1 capsule, oral, Daily      Continuous medications     PRN medications  PRN medications: traMADol    Review of Systems   Constitutional:  Negative for chills, diaphoresis, fatigue and fever.   Respiratory:  Negative for cough, chest tightness and shortness of breath.    Cardiovascular:  Negative for chest pain.   Gastrointestinal:  Negative for abdominal distention, abdominal pain, diarrhea, nausea and vomiting.   Genitourinary:  Negative for difficulty urinating, dyspareunia, dysuria, frequency, hematuria and urgency.   Musculoskeletal:         Left foot pain   Skin:  Negative for rash and wound.   All other systems reviewed and are negative.       Objective  Range of Vitals (last 24 hours)  Heart Rate:  []   Temp:  [36.5 °C (97.7 °F)-37.1 °C (98.8 °F)]   Resp:  [18-19]   BP: (149-173)/(66-93)   SpO2:  [96 %-97 %]   Daily Weight  12/11/24 : 78.5 kg (173 lb)    Body mass index is 30.65 kg/m².     Physical Exam  Constitutional:       Appearance: Normal appearance.   HENT:      Head:  "Normocephalic and atraumatic.   Eyes:      Extraocular Movements: Extraocular movements intact.      Conjunctiva/sclera: Conjunctivae normal.   Cardiovascular:      Rate and Rhythm: Normal rate. Rhythm irregular.   Pulmonary:      Effort: Pulmonary effort is normal.      Breath sounds: Normal breath sounds.   Abdominal:      General: Bowel sounds are normal.      Palpations: Abdomen is soft.   Musculoskeletal:      Cervical back: Normal range of motion and neck supple.      Comments: Left foot dressing and boot in place   Skin:     General: Skin is warm and dry.   Neurological:      General: No focal deficit present.      Mental Status: She is alert and oriented to person, place, and time.   Psychiatric:         Mood and Affect: Mood normal.         Behavior: Behavior normal.          Relevant Results    Labs  Results from last 72 hours   Lab Units 12/13/24  0607 12/12/24  1822   WBC AUTO x10*3/uL 10.3 12.4*   HEMOGLOBIN g/dL 13.0 13.8   HEMATOCRIT % 40.3 41.9   PLATELETS AUTO x10*3/uL 224 238   NEUTROS PCT AUTO %  --  77.1   LYMPHS PCT AUTO %  --  13.5   MONOS PCT AUTO %  --  7.2   EOS PCT AUTO %  --  1.5     Results from last 72 hours   Lab Units 12/13/24  0607 12/12/24  1822   SODIUM mmol/L 142 142   POTASSIUM mmol/L 3.8 3.8   CHLORIDE mmol/L 107 109*   CO2 mmol/L 31 26   BUN mg/dL 14 14   CREATININE mg/dL 0.78 0.71   GLUCOSE mg/dL 93 92   CALCIUM mg/dL 8.5* 9.0   ANION GAP mmol/L 8* 11   EGFR mL/min/1.73m*2 75 84         Estimated Creatinine Clearance: 53.2 mL/min (by C-G formula based on SCr of 0.78 mg/dL).  C-Reactive Protein   Date Value Ref Range Status   09/24/2024 4.96 (H) <1.00 mg/dL Final     No results found for: \"HIV1X2\", \"HIVCONF\", \"WJKPAB3VN\"  No results found for: \"HEPCABINIT\", \"HEPCAB\", \"HCVPCRQUANT\"  Microbiology  Susceptibility data from last 90 days.  Collected Specimen Info Organism Amoxicillin/Clavulanate Ampicillin Ampicillin/Sulbactam Cefazolin Ceftriaxone Ciprofloxacin Gentamicin " Nitrofurantoin Piperacillin/Tazobactam Trimethoprim/Sulfamethoxazole   11/04/24 Urine from Clean Catch/Voided Ronnie alonzoata  R  R  R  R  S  S  S  S  S  S     Upon review of further urine culture she has a history of Klebsiella pneumoniae and E. coli  Imaging  CT head wo IV contrast    Result Date: 12/12/2024  Interpreted By:  Eduarda Denson, STUDY: CT HEAD WO IV CONTRAST; CT CERVICAL SPINE WO IV CONTRAST;  12/12/2024 12:53 am   INDICATION: Signs/Symptoms:head injury; Signs/Symptoms:head injury after fall.   COMPARISON: Head CT 09/24/2024   ACCESSION NUMBER(S): [id number]; [id number]   ORDERING CLINICIAN: EULALIA MORGAN   TECHNIQUE: Noncontrast CT images of head. Axial noncontrast CT images of the cervical spine with coronal and sagittal reconstructed images.   FINDINGS: BRAIN PARENCHYMA:  Gray-white matter interfaces are  preserved. No mass effect or midline shift.   HEMORRHAGE: No acute intracranial hemorrhage. VENTRICLES and EXTRA-AXIAL SPACES: The ventricles and sulci are within normal limits in size for brain volume. No abnormal extraaxial fluid collection. EXTRACRANIAL SOFT TISSUES: Within normal limits. PARANASAL SINUSES/MASTOIDS:  The visualized paranasal sinuses and mastoid air cells are aerated. CALVARIUM: No depressed skull fracture. No destructive osseous lesion.   OTHER FINDINGS: None.   CERVICAL SPINE:   ALIGNMENT: Normal. VERTEBRAE: No acute fracture. SPINAL CANAL: Mild multilevel degenerative disc changes. No critical spinal canal stenosis. PREVERTEBRAL SOFT TISSUES: No prevertebral soft tissue swelling. LUNG APICES: Imaged portion of the lung apices are within normal limits.   OTHER FINDINGS: None.       No acute intracranial abnormality.   No acute fracture or traumatic subluxation of the cervical spine.       MACRO: None   Signed by: Eduarda Denson 12/12/2024 1:37 AM Dictation workstation:   [id number]    CT cervical spine wo IV contrast    Result Date: 12/12/2024  Interpreted By:  Janiya  Eduarda, STUDY: CT HEAD WO IV CONTRAST; CT CERVICAL SPINE WO IV CONTRAST;  12/12/2024 12:53 am   INDICATION: Signs/Symptoms:head injury; Signs/Symptoms:head injury after fall.   COMPARISON: Head CT 09/24/2024   ACCESSION NUMBER(S): [id number]; [id number]   ORDERING CLINICIAN: EULALIA MORGAN   TECHNIQUE: Noncontrast CT images of head. Axial noncontrast CT images of the cervical spine with coronal and sagittal reconstructed images.   FINDINGS: BRAIN PARENCHYMA:  Gray-white matter interfaces are  preserved. No mass effect or midline shift.   HEMORRHAGE: No acute intracranial hemorrhage. VENTRICLES and EXTRA-AXIAL SPACES: The ventricles and sulci are within normal limits in size for brain volume. No abnormal extraaxial fluid collection. EXTRACRANIAL SOFT TISSUES: Within normal limits. PARANASAL SINUSES/MASTOIDS:  The visualized paranasal sinuses and mastoid air cells are aerated. CALVARIUM: No depressed skull fracture. No destructive osseous lesion.   OTHER FINDINGS: None.   CERVICAL SPINE:   ALIGNMENT: Normal. VERTEBRAE: No acute fracture. SPINAL CANAL: Mild multilevel degenerative disc changes. No critical spinal canal stenosis. PREVERTEBRAL SOFT TISSUES: No prevertebral soft tissue swelling. LUNG APICES: Imaged portion of the lung apices are within normal limits.   OTHER FINDINGS: None.       No acute intracranial abnormality.   No acute fracture or traumatic subluxation of the cervical spine.       MACRO: None   Signed by: Eduarda Denson 12/12/2024 1:37 AM Dictation workstation:   [id number]    XR foot left 3+ views    Result Date: 12/11/2024  Interpreted By:  Delano Galeana, STUDY: XR ANKLE LEFT 3+ VIEWS; XR FOOT LEFT 3+ VIEWS; ;  12/11/2024 10:20 pm   INDICATION: Signs/Symptoms:fall and lateral swelling; Signs/Symptoms:fall, pain to the top of the foot.     COMPARISON: None.   ACCESSION NUMBER(S): [id number]; [id number]   ORDERING CLINICIAN: EULALIA MORGAN   FINDINGS: LEFT ANKLE: Plantar calcaneal spur. No  acute fracture. The ankle mortise is symmetric without medial or lateral clear space widening. The tibiotalar and subtalar joints are intact.   LEFT FOOT: There is no acute fracture of the 5th metatarsal neck, 4th metatarsal neck, and 3rd metatarsal neck. All fractures are angulated in the lateral direction slightly. The Lisfranc joint is intact. There is soft tissue swelling over the distal forefoot.       Acute slightly angulated fractures of the 3rd-5th metatarsal necks with mild ulnar displacement.   No acute osseous abnormality of the left ankle.   MACRO: None.   Signed by: Delano Galeana 12/11/2024 11:12 PM Dictation workstation:   [id number]    XR ankle left 3+ views    Result Date: 12/11/2024  Interpreted By:  Delano Galeana, STUDY: XR ANKLE LEFT 3+ VIEWS; XR FOOT LEFT 3+ VIEWS; ;  12/11/2024 10:20 pm   INDICATION: Signs/Symptoms:fall and lateral swelling; Signs/Symptoms:fall, pain to the top of the foot.     COMPARISON: None.   ACCESSION NUMBER(S): [id number]; [id number]   ORDERING CLINICIAN: EULALIA MORGAN   FINDINGS: LEFT ANKLE: Plantar calcaneal spur. No acute fracture. The ankle mortise is symmetric without medial or lateral clear space widening. The tibiotalar and subtalar joints are intact.   LEFT FOOT: There is no acute fracture of the 5th metatarsal neck, 4th metatarsal neck, and 3rd metatarsal neck. All fractures are angulated in the lateral direction slightly. The Lisfranc joint is intact. There is soft tissue swelling over the distal forefoot.       Acute slightly angulated fractures of the 3rd-5th metatarsal necks with mild ulnar displacement.   No acute osseous abnormality of the left ankle.   MACRO: None.   Signed by: Delano Galeana 12/11/2024 11:12 PM Dictation workstation:   [id number]     Assessment/Plan   Left foot fracture of the 3rd-5th metatarsal necks and a mild ulnar displacement- conservative management advised  Resolved leukocytosis    No antibiotics at this  time  Monitor for signs and symptoms of infection  Urinalysis not indicative of infection, patient is also asymptomatic  Monitor WBC and temperature  Supportive care  Work-up per cardiology and neurology pending    Discussed with Dr Coleman    Total time spent caring for the patient today was 40 minutes.  This includes time spent before the visit reviewing the chart, time spent during the visit, and time spent after the visit on documentation.    Dora CAMPBELL Staff, APRN-CNP

## 2024-12-13 NOTE — PROGRESS NOTES
Dl Castellanos is a 84 y.o. female on day 1 of admission presenting with Closed fracture of left foot, initial encounter.      Subjective   Pain foot wants to go home''no help at home''       Objective     Last Recorded Vitals  /72 (Patient Position: Standing)   Pulse 73   Temp 37.1 °C (98.8 °F) (Oral)   Resp 18   Wt 78.5 kg (173 lb)   SpO2 97%   Intake/Output last 3 Shifts:  No intake or output data in the 24 hours ending 12/13/24 1300    Admission Weight  Weight: 78.5 kg (173 lb) (12/11/24 2206)    Daily Weight  12/11/24 : 78.5 kg (173 lb)    Image Results  Transthoracic Echo (TTE) [place] [address]             Phone [phone]    TRANSTHORACIC ECHOCARDIOGRAM REPORT    Patient Name:       DL CASTELLANOS     Reading Physician:    [id number] Tor Medley MD  Study Date:         12/13/2024           Ordering Provider:    [id number] SILVANO LUNDBERG  MRN/PID:            [id number]             Fellow:  Accession#:         [id number]         Nurse:  Date of Birth/Age:  1940 / 84 years Sonographer:          Wild Harrington RDCS  Gender Assigned at  F                    Additional Staff:  Birth:  Height:             160.00 cm            Admit Date:  Weight:             79.00 kg             Admission Status:     Inpatient -                                                                 Routine  BSA / BMI:          1.82 m2 / 30.86      [facility]                      kg/m2  Blood Pressure: 149 /86 mmHg    Study Type:    TRANSTHORACIC ECHO (TTE) LIMITED  Diagnosis/ICD: Syncope-R55  Indication:    Syncope  CPT Codes:     Echo Limited-78563; Doppler Full-84248; Color Doppler-29119    Patient History:  Pertinent History: A-Fib, Chest  Pain, Dyspnea, HTN, Hyperlipidemia, Murmur, LE                     Edema and Syncope.    Study Detail: The following Echo studies were performed: 2D, M-Mode, Doppler and                color flow. Unable to obtain suprasternal notch, subcostal and                Limited Exam view.       PHYSICIAN INTERPRETATION:  Left Ventricle: Left ventricular ejection fraction is normal, by visual estimate at 55-60%. There are no regional wall motion abnormalities. The left ventricular cavity size is normal. Spectral Doppler shows a Grade I (impaired relaxation pattern) of left ventricular diastolic filling with normal left atrial filling pressure.  Left Atrium: The left atrium is upper limits of normal in size.  Right Ventricle: The right ventricle is normal in size. There is normal right ventricular global systolic function.  Right Atrium: The right atrium is upper limits of normal in size.  Aortic Valve: The aortic valve is probably trileaflet. There is evidence of mildly elevated transaortic gradients consistent with sclerosis of the aortic valve.  The aortic valve dimensionless index is 0.58. There is trace aortic valve regurgitation. The peak instantaneous gradient of the aortic valve is 5 mmHg. The mean gradient of the aortic valve is 3 mmHg.  Mitral Valve: The mitral valve is normal in structure. The peak instantaneous gradient of the mitral valve is 4 mmHg. There is trace mitral valve regurgitation.  Tricuspid Valve: The tricuspid valve is structurally normal. There is mild tricuspid regurgitation.  Pulmonic Valve: The pulmonic valve is not well visualized. There is trace pulmonic valve regurgitation.  Pericardium: Trivial pericardial effusion.  Aorta: The aortic root was not well visualized.       CONCLUSIONS:   1. Left ventricular ejection fraction is normal, by visual estimate at 55-60%.   2. Spectral Doppler shows a Grade I (impaired relaxation pattern) of left ventricular diastolic filling with normal left atrial  filling pressure.   3. There is normal right ventricular global systolic function.   4. Aortic valve sclerosis.    QUANTITATIVE DATA SUMMARY:     LA VOLUME:                   Normal Ranges:  LA Vol A4C:       122.9 ml  LA Vol A2C:       100.9 ml  LA Vol Index BSA: 61.4 ml/m2       RA VOLUME BY A/L METHOD:          Normal Ranges:  RA Area A4C:             22.3 cm2       LV SYSTOLIC FUNCTION BY 2D PLANIMETRY (MOD):                       Normal Ranges:  EF-A4C View:    51 % (>=55%)  EF-A2C View:    44 %  EF-Biplane:     51 %  EF-Visual:      58 %  EF-3DQ:         57 %  LV EF Reported: 58 %       MITRAL VALVE:          Normal Ranges:  MV Vmax:      0.96 m/s (<=1.3m/s)  MV peak PG:   3.7 mmHg (<5mmHg)  MV mean P.5 mmHg (<48mmHg)       AORTIC VALVE:                     Normal Ranges:  AoV Vmax:                1.17 m/s (<=1.7m/s)  AoV Peak P.5 mmHg (<20mmHg)  AoV Mean PG:             3.0 mmHg (1.7-11.5mmHg)  LVOT Max Fabián:            0.74 m/s (<=1.1m/s)  AoV VTI:                 22.59 cm (18-25cm)  LVOT VTI:                13.19 cm  LVOT Diameter:           1.95 cm  (1.8-2.4cm)  AoV Area, VTI:           1.75 cm2 (2.5-5.5cm2)  AoV Area,Vmax:           1.90 cm2 (2.5-4.5cm2)  AoV Dimensionless Index: 0.58       RIGHT VENTRICLE:  RV Basal 3.90 cm  RV Mid   2.80 cm  RV Major 4.5 cm       TRICUSPID VALVE/RVSP:          Normal Ranges:  Peak TR Velocity:     2.93 m/s  RV Syst Pressure:     37 mmHg  (< 30mmHg)       PULMONIC VALVE:          Normal Ranges:  PV Max Fabián:     1.0 m/s  (0.6-0.9m/s)  PV Max P.0 mmHg  PV Mean P.1 mmHg  PV VTI:         21.70 cm       [id number] Tor Medley MD  Electronically signed on 2024 at 11:39:53 AM       ** Final **      Physical Exam    Relevant Results               Assessment/Plan                  Assessment & Plan  Closed fracture of left foot, initial encounter    Fall, initial encounter    Awaitting neuro and cardiology clearence     ECHO and EEG pending  as soon as they are ok can go         Ruth Cervantes MD

## 2024-12-13 NOTE — CONSULTS
Inpatient consult to Neurology  Consult performed by: Ronald Villagomez MD  Consult ordered by: Ruth Cervantes MD          History Of Present Illness  Mercedes Castellanos is a 84 y.o. female presenting with a fall resulting in foot fracture. Neuro consulted for this.    Patient states she does not recall the exact time / how she fell. She feels everything came on suddenly. She knows she was feeling lightheaded -- she often does especially when getting out of bed, she would sit on the edge of the bed before getting up to avoid falls. This time she is unsure how she fell. She knows next thing was that she was on the floor and her daughter found her on the ground. She is unsure how long she was on the ground and if she hit her foot anywhere. She injured her L foot. She came to ED for evaluation for her L foot.     Denies any prior falls or LOC events. She has hx of Afib on eliquis.   Her CT head was done in the ED which was personally reviewed, was unremarkable, negative for acute changes / bleed.    Patient currently denies any vision changes, vertigo, hearing change, speech change, weakness of any particular arm or leg other than L foot which is limited by pain. Difficulty walking due to L foot injury.       Past Medical History  Past Medical History:   Diagnosis Date    Acute candidiasis of vulva and vagina 09/21/2018    Yeast vaginitis    Body mass index (BMI) 35.0-35.9, adult     BMI 35.0-35.9,adult    Body mass index (BMI) 36.0-36.9, adult     BMI 36.0-36.9,adult    Combined forms of age-related cataract, bilateral 06/09/2022    Combined form of age-related cataract, both eyes    Disorder of the skin and subcutaneous tissue, unspecified 01/12/2017    Skin lesion    Dorsalgia, unspecified 12/09/2013    Pain, upper back    Dyskinesia of esophagus 09/16/2019    Esophageal spasm    Elevated blood-pressure reading, without diagnosis of hypertension 04/08/2019    Elevated blood pressure reading    Encounter for screening for  lipoid disorders 04/18/2019    Encounter for lipid screening for cardiovascular disease    Essential (primary) hypertension 09/10/2018    Benign essential hypertension    Irritable bowel syndrome with diarrhea 11/07/2019    Irritable bowel syndrome with diarrhea    Lumbago with sciatica, left side 04/24/2019    Chronic bilateral low back pain with bilateral sciatica    Mastodynia 09/26/2019    Breast tenderness in female    Menopausal and female climacteric states 08/19/2019    Female climacteric state    Other conditions influencing health status 08/30/2018    History of burning on urination    Other conditions influencing health status 09/09/2013    Foot pain, unspecified laterality    Other conditions influencing health status     Colonoscopy (Fiberoptic)    Other conditions influencing health status     Mammogram    Other dysphagia 11/07/2019    Esophageal dysphagia    Other dysphagia 12/10/2019    Esophageal dysphagia    Other specified soft tissue disorders 06/26/2019    Left leg swelling    Pain in right hip 11/13/2019    Right hip pain    Pain in right shoulder 06/26/2019    Chronic right shoulder pain    Pain in thoracic spine 08/26/2020    Chronic bilateral thoracic back pain    Personal history of other diseases of the circulatory system     History of hypertension    Personal history of other diseases of the digestive system 10/03/2019    History of gastroesophageal reflux (GERD)    Personal history of other diseases of the musculoskeletal system and connective tissue 12/15/2020    History of low back pain    Personal history of other drug therapy 10/12/2016    History of influenza vaccination    Personal history of other drug therapy 02/09/2021    History of pneumococcal vaccination    Personal history of other endocrine, nutritional and metabolic disease 09/10/2018    History of vitamin D deficiency    Personal history of other medical treatment 07/20/2017    History of screening mammography     Personal history of other mental and behavioral disorders 08/19/2019    History of anxiety    Personal history of other specified conditions 09/10/2018    History of fatigue    Personal history of other specified conditions 05/01/2019    History of abnormal mammogram    Personal history of other specified conditions 10/19/2017    History of precordial chest pain    Personal history of other specified conditions 07/07/2020    History of dizziness    Personal history of other specified conditions 08/30/2018    History of painful urination    Personal history of other specified conditions 09/23/2018    History of diarrhea    Personal history of urinary (tract) infections 09/10/2018    History of urinary tract infection    Primary osteoarthritis, unspecified hand 07/07/2020    Hand arthritis    Sacrococcygeal disorders, not elsewhere classified 12/15/2020    Sacroiliac joint dysfunction of right side    Trigger finger, unspecified finger 10/12/2016    Trigger finger, acquired    Unspecified atrial fibrillation (Multi) 10/08/2018    New onset a-fib    Unsteadiness on feet 03/11/2020    Unsteadiness on feet    Vitreous degeneration, bilateral 08/26/2019    Degeneration of posterior vitreous body of both eyes     Surgical History  Past Surgical History:   Procedure Laterality Date    ESOPHAGOGASTRODUODENOSCOPY  03/11/2014    Diagnostic Esophagogastroduodenoscopy    GASTRIC FUNDOPLICATION  05/27/2014    Esophagogastric Fundoplasty Nissen Fundoplication    OTHER SURGICAL HISTORY  05/26/2022    Rotator cuff repair    OTHER SURGICAL HISTORY  05/26/2022    Appendectomy    OTHER SURGICAL HISTORY  05/26/2022    Partial hysterectomy     Social History  Social History     Tobacco Use    Smoking status: Never    Smokeless tobacco: Never   Vaping Use    Vaping status: Never Used   Substance Use Topics    Alcohol use: Not Currently    Drug use: Never     Allergies  Nitrofurantoin and Hydrocodone-acetaminophen  Medications Prior to  Admission   Medication Sig Dispense Refill Last Dose/Taking    apixaban (Eliquis) 5 mg tablet Take 1 tablet (5 mg) by mouth 2 times a day. 180 tablet 0 12/11/2024 Evening    atenolol (Tenormin) 25 mg tablet Take 1 tablet (25 mg) by mouth once daily.   12/11/2024 Evening    cranberry 500 mg capsule Take 500 mg by mouth once daily.   12/11/2024    digoxin (Lanoxin) 125 MCG tablet Take 1 tablet (125 mcg) by mouth 3 times a week. 30 tablet 0 12/10/2024 Morning    escitalopram (Lexapro) 10 mg tablet Take 1 tablet (10 mg) by mouth once daily.   12/11/2024 Morning    midodrine (Proamatine) 2.5 mg tablet Take 1 tablet (2.5 mg) by mouth 2 times daily (morning and late afternoon). (Patient taking differently: Take 0.5 tablets (1.25 mg) by mouth 2 times daily (morning and late afternoon).) 60 tablet 11 12/11/2024    multivitamin tablet Take 1 tablet by mouth once daily.   12/11/2024    omeprazole (PriLOSEC) 40 mg DR capsule Take 1 capsule (40 mg) by mouth once daily in the morning. Take before meals. 90 capsule 3 12/11/2024    escitalopram (Lexapro) 10 mg tablet Take 1 tablet (10 mg) by mouth once daily. (Patient not taking: Reported on 12/12/2024) 30 tablet 5 Not Taking       Review of Systems  Neurological Exam  Physical Exam  MENTAL STATUS:  General appearance: in NAD  Orientation: [place] to self, time, place and condition   Language: Expression, repetition, naming, comprehension intact.   Concentration: Intact  Fund of knowledge: Appropriate    CRANIAL NERVES:  - Fundoscopic exam: Deferred   - II/III: PERRL  - II:  Visual fields intact to confrontation bilaterally   - III, IV, VI: EOMI to pursuit without nystagmus  - V: V1-V3 sensation intact bilaterally  - VII: Face muscles symmetric with smile and eye closure  - VIII: Intact to finger rub  - IX, X: Palate elevated symmetrically bilaterally, no hoarseness  - XI: 5/5 strength on shoulder shrugging bilaterally  - XII: Tongue midline without atrophy or  "fasciculation    MOTOR: Tone and bulk normal in all extremities  STRENGTH: 5/5 strength tested proximally and distally in BUE and RLE, LLE knee extension and ankle movements limited by pain from injury     REFLEXES: R L  Biceps   +2 +2  Brachioradialis +2 +2  Patellar    +1 +1  Achilles    +1    * (brace)  Plantar   mute  * (brace)   No clonus on R    COORDINATION: Intact on finger to nose bl, limited heel to shin on L from injury / pain with movement   SENSORY: Intact to light touch in BUE and BLE  GAIT: deferred       Last Recorded Vitals  Blood pressure 149/86, pulse 74, temperature 37.1 °C (98.8 °F), temperature source Oral, resp. rate 18, height 1.6 m (5' 3\"), weight 78.5 kg (173 lb), SpO2 97%.    Relevant Results                    Alphonse Coma Scale  Best Eye Response: Spontaneous  Best Verbal Response: Oriented  Best Motor Response: Follows commands  Alphonse Coma Scale Score: 15                 I have personally reviewed the following imaging results CT head wo IV contrast    Result Date: 12/12/2024  Interpreted By:  Eduarda Denson, STUDY: CT HEAD WO IV CONTRAST; CT CERVICAL SPINE WO IV CONTRAST;  12/12/2024 12:53 am   INDICATION: Signs/Symptoms:head injury; Signs/Symptoms:head injury after fall.   COMPARISON: Head CT 09/24/2024   ACCESSION NUMBER(S): [id number]; [id number]   ORDERING CLINICIAN: EULALIA MORGAN   TECHNIQUE: Noncontrast CT images of head. Axial noncontrast CT images of the cervical spine with coronal and sagittal reconstructed images.   FINDINGS: BRAIN PARENCHYMA:  Gray-white matter interfaces are  preserved. No mass effect or midline shift.   HEMORRHAGE: No acute intracranial hemorrhage. VENTRICLES and EXTRA-AXIAL SPACES: The ventricles and sulci are within normal limits in size for brain volume. No abnormal extraaxial fluid collection. EXTRACRANIAL SOFT TISSUES: Within normal limits. PARANASAL SINUSES/MASTOIDS:  The visualized paranasal sinuses and mastoid air cells are aerated. CALVARIUM: " No depressed skull fracture. No destructive osseous lesion.   OTHER FINDINGS: None.   CERVICAL SPINE:   ALIGNMENT: Normal. VERTEBRAE: No acute fracture. SPINAL CANAL: Mild multilevel degenerative disc changes. No critical spinal canal stenosis. PREVERTEBRAL SOFT TISSUES: No prevertebral soft tissue swelling. LUNG APICES: Imaged portion of the lung apices are within normal limits.   OTHER FINDINGS: None.       No acute intracranial abnormality.   No acute fracture or traumatic subluxation of the cervical spine.       MACRO: None   Signed by: Eduarda Denson 12/12/2024 1:37 AM Dictation workstation:   [id number]    CT cervical spine wo IV contrast    Result Date: 12/12/2024  Interpreted By:  Eduarda Denson, STUDY: CT HEAD WO IV CONTRAST; CT CERVICAL SPINE WO IV CONTRAST;  12/12/2024 12:53 am   INDICATION: Signs/Symptoms:head injury; Signs/Symptoms:head injury after fall.   COMPARISON: Head CT 09/24/2024   ACCESSION NUMBER(S): [id number]; [id number]   ORDERING CLINICIAN: EULALIA MORGAN   TECHNIQUE: Noncontrast CT images of head. Axial noncontrast CT images of the cervical spine with coronal and sagittal reconstructed images.   FINDINGS: BRAIN PARENCHYMA:  Gray-white matter interfaces are  preserved. No mass effect or midline shift.   HEMORRHAGE: No acute intracranial hemorrhage. VENTRICLES and EXTRA-AXIAL SPACES: The ventricles and sulci are within normal limits in size for brain volume. No abnormal extraaxial fluid collection. EXTRACRANIAL SOFT TISSUES: Within normal limits. PARANASAL SINUSES/MASTOIDS:  The visualized paranasal sinuses and mastoid air cells are aerated. CALVARIUM: No depressed skull fracture. No destructive osseous lesion.   OTHER FINDINGS: None.   CERVICAL SPINE:   ALIGNMENT: Normal. VERTEBRAE: No acute fracture. SPINAL CANAL: Mild multilevel degenerative disc changes. No critical spinal canal stenosis. PREVERTEBRAL SOFT TISSUES: No prevertebral soft tissue swelling. LUNG APICES: Imaged portion of  the lung apices are within normal limits.   OTHER FINDINGS: None.       No acute intracranial abnormality.   No acute fracture or traumatic subluxation of the cervical spine.       MACRO: None   Signed by: Eduarda Denson 12/12/2024 1:37 AM Dictation workstation:   [id number]    XR foot left 3+ views    Result Date: 12/11/2024  Interpreted By:  Delano Galeana, STUDY: XR ANKLE LEFT 3+ VIEWS; XR FOOT LEFT 3+ VIEWS; ;  12/11/2024 10:20 pm   INDICATION: Signs/Symptoms:fall and lateral swelling; Signs/Symptoms:fall, pain to the top of the foot.     COMPARISON: None.   ACCESSION NUMBER(S): [id number]; [id number]   ORDERING CLINICIAN: EULALIA MORGAN   FINDINGS: LEFT ANKLE: Plantar calcaneal spur. No acute fracture. The ankle mortise is symmetric without medial or lateral clear space widening. The tibiotalar and subtalar joints are intact.   LEFT FOOT: There is no acute fracture of the 5th metatarsal neck, 4th metatarsal neck, and 3rd metatarsal neck. All fractures are angulated in the lateral direction slightly. The Lisfranc joint is intact. There is soft tissue swelling over the distal forefoot.       Acute slightly angulated fractures of the 3rd-5th metatarsal necks with mild ulnar displacement.   No acute osseous abnormality of the left ankle.   MACRO: None.   Signed by: Delano Galeana 12/11/2024 11:12 PM Dictation workstation:   [id number]    XR ankle left 3+ views    Result Date: 12/11/2024  Interpreted By:  Delano Galeana, STUDY: XR ANKLE LEFT 3+ VIEWS; XR FOOT LEFT 3+ VIEWS; ;  12/11/2024 10:20 pm   INDICATION: Signs/Symptoms:fall and lateral swelling; Signs/Symptoms:fall, pain to the top of the foot.     COMPARISON: None.   ACCESSION NUMBER(S): [id number]; [id number]   ORDERING CLINICIAN: EULALIA MORGAN   FINDINGS: LEFT ANKLE: Plantar calcaneal spur. No acute fracture. The ankle mortise is symmetric without medial or lateral clear space widening. The tibiotalar and subtalar joints are intact.   LEFT  FOOT: There is no acute fracture of the 5th metatarsal neck, 4th metatarsal neck, and 3rd metatarsal neck. All fractures are angulated in the lateral direction slightly. The Lisfranc joint is intact. There is soft tissue swelling over the distal forefoot.       Acute slightly angulated fractures of the 3rd-5th metatarsal necks with mild ulnar displacement.   No acute osseous abnormality of the left ankle.   MACRO: None.   Signed by: Delano Galeana 12/11/2024 11:12 PM Dictation workstation:   [id number]  .      Assessment/Plan   Assessment & Plan  Closed fracture of left foot, initial encounter    Fall, initial encounter      Mercedes Castellanos is a 84 y.o. female presenting with a fall resulting in foot fracture. Mechanism unclear and patient does not recollect the event, would be safer to assume she lost consciousness. Ddx includes syncope based on her description of lightheadedness prior, but would still evaluate for seizure with a routine EEG.    Dx. Fall, unknown mechanism  Possible LOC associated     Recs:  - routine EEG today   - will follow up on results, can be done via phone call for results as well        I spent 60 minutes in the professional and overall care of this patient.      Ronald Villagomez MD

## 2024-12-13 NOTE — PROGRESS NOTES
12/13/24 1357   Discharge Planning   Expected Discharge Disposition SNF   Does the patient need discharge transport arranged? Yes   RoundTrip coordination needed? Yes   Has discharge transport been arranged? No   Patient Choice   Provider Choice list and CMS website (https://medicare.gov/care-compare#search) for post-acute Quality and Resource Measure Data were provided and reviewed with: Patient   Patient / Family choosing to utilize agency / facility established prior to hospitalization No     MSW met with patient and discussed SNF per therapy recommendations. Patient is open to SNF.   [place] of choice list provided to the patient at this time. She will review, discuss with family and advise.     Discharge plan is not secure. Do not discharge patient without speaking to Care Transitions.

## 2024-12-13 NOTE — PROGRESS NOTES
Occupational Therapy    Evaluation/Treatment    Patient Name: Mercedes Castellanos  MRN: [id number]  Department: [address]  Room: [address] [place] [address]  Today's Date: 12/13/24  Time Calculation  Start Time: 1150  Stop Time: 1213  Time Calculation (min): 23 min       Assessment:  End of Session Communication: Bedside nurse  End of Session Patient Position: Up in chair, Alarm on (call light and tray table within reach. LLE propped on makeshift stool with pillow on top, L heel floating off edge.)  Strengths:  (Support of pets;Support of extended family/friends;Living arrangement secure;Attitude of self;Housing layout;)  Barriers to Participation: Comorbidities  Plan:  Treatment Interventions: ADL retraining, Functional transfer training, UE strengthening/ROM, Endurance training, Patient/family training, Compensatory technique education, Cognitive reorientation  OT Frequency: 3 times per week  OT Discharge Recommendations: Moderate intensity level of continued care  OT Recommended Transfer Status: Assist of 1  OT - OK to Discharge: Yes  Treatment Interventions: ADL retraining, Functional transfer training, UE strengthening/ROM, Endurance training, Patient/family training, Compensatory technique education, Cognitive reorientation    Subjective   Current Problem:  1. Closed fracture of left foot, initial encounter        2. Orthostatic hypotension  Transthoracic Echo (TTE) Limited    Transthoracic Echo (TTE) Limited      3. Syncope and collapse  Transthoracic Echo (TTE) Limited        General:   OT Received On: 12/13/24  General  Reason for Referral: OT Eval and treat. Pt is an 84 year old was admitted for a mechanical fall which resulted in closed, minimally displaced fxs of L metarsals 3, 4 & 5.  Past Medical History Relevant to Rehab: anxiety, A-fib (paroysnmal, on Eliquis), HTN, dementia, lumbar spinal stenosis with neurogenic claudication, IBS, osteopenia, chronic back pain  Family/Caregiver Present: No  Prior to Session Communication:  Bedside nurse  Patient Position Received: Bed, 3 rail up, Alarm off, not on at start of session  General Comment: Cleared by RN for participation. Pt agreed to session and was fully engaged throughout.   Precautions:  Hearing/Visual Limitations: Hearing WFL for age. Progressive lenses donned.  LE Weight Bearing Status:  ('LLE WBAT in surgical shoe with walker & try to maintain weight on L heel' per PT/Dr. Jerome.)  Medical Precautions: Fall precautions  Precautions Comment: L flat surgical shoe donned throughout. Per pt, it was issued to her this admission.    Pain:  Pain Assessment  0-10 (Numeric) Pain Score: 4  Pain Type: Acute pain  Pain Location:  (L foot/toes)  Pain Interventions: Rest    Objective   Cognition:  Overall Cognitive Status: Impaired at baseline  Following Commands: Follows one step commands with repetition  Impulsive: Mildly      Benjamin Agitation Sedation Scale  Benjamin Agitation Sedation Scale (RASS): Alert and calm  Home Living:  Type of Home: Condo  Lives With: Alone  Home Adaptive Equipment: Cane  Home Layout: One level  Bathroom Shower/Tub: Tub/shower unit  Bathroom Toilet: Standard  Bathroom Equipment: None  Home Living Comments: Dtr lived nearby and did not work. Pt also had sons.  Prior Function:  Receives Help From: Family  ADL Assistance: Independent  Homemaking Assistance: Independent  Ambulatory Assistance: Needs assistance  Transfers: Independent  Gait: Independent  Vocational: Retired    ADL:  Eating Assistance: Independent  Grooming Assistance: Stand by  Bathing Assistance: Minimal  UE Dressing Assistance: Stand by  LE Dressing Assistance: Moderate  Toileting Assistance with Device: Minimal    Activity Tolerance:  Endurance: Endurance does not limit participation in activity  Functional Standing Tolerance:  Time:  (stand ~ 1-2 min, c/o dizziness frequently with standing- Pt reports feeling unsteady/fearful of standing)  Bed Mobility/Transfers: Bed Mobility  Bed Mobility:  (close  supervision)    Transfers  Transfer:  (Min A FOR SIT <-> STAND, POOR CONTROL, impulsive)    Functional Mobility:  Functional Mobility  Functional Mobility Performed:  (Min A progressing to close supervision with FWW)    Sensation:  Light Touch: No apparent deficits  Strength:  Strength Comments:  (B UE ~ 3+/5 throughout)    Coordination:  Movements are Fluid and Coordinated: Yes   Hand Function:  Hand Function  Gross Grasp: Functional  Coordination: Functional    Outcome Measures: [facility] Daily Activity  Putting on and taking off regular lower body clothing: A lot  Bathing (including washing, rinsing, drying): A lot  Putting on and taking off regular upper body clothing: A little  Toileting, which includes using toilet, bedpan or urinal: A lot  Taking care of personal grooming such as brushing teeth: A little  Eating Meals: None  Daily Activity - Total Score: 16    Education Documentation  ADL Training, taught by Lizbeth Mcgraw OT at 12/13/2024  1:12 PM.  Learner: Patient  Readiness: Acceptance  Method: Explanation  Response: Verbalizes Understanding    Education Comments  No comments found.        OP EDUCATION:       Goals:  Encounter Problems       Encounter Problems (Active)       ADL       Goal 1 (Progressing)       Start:  12/13/24    Expected End:  12/27/24       Patient will demonstrate improved ADL skills:  Bathing with CG A assist with adaptive equipment/DME prn     Grooming with supervision    UE Dressing with supervision assist           LE Dressing with CG A assist with adaptive equipment/DME prn     Toileting with CG A assist with adaptive equipment/DME prn

## 2024-12-13 NOTE — PROGRESS NOTES
Physical Therapy    Physical Therapy Evaluation & Treatment    Patient Name: Mercedes Castellanos  MRN: [id number]  Department: [address]  Room: [address] [place] [address]  Today's Date: 12/13/2024   Time Calculation  Start Time: 1003  Stop Time: 1032  Time Calculation (min): 29 min    Assessment/Plan   PT Assessment  PT Assessment Results: Decreased strength, Decreased mobility, Impaired balance, Decreased cognition, Decreased safety awareness, Impaired sensation, Pain  Rehab Prognosis: Good  Barriers to Discharge Home: Cognition needs, Caregiver assistance  Caregiver Assistance:  (While pt could have assist at home from her dtr, this PT anticipated need for family training for maximal safety of pt & caregiver.)  Cognition Needs: 24hr supervision for safety awareness needed (at least initially)  Strengths: Support of pets, Support of extended family/friends, Living arrangement secure, Attitude of self, Housing layout  Barriers to Participation: Comorbidities  End of Session Communication: Bedside nurse  Assessment Comment: She presented with the above listed impairments (see Assessment Results). Pt required up to moderately increased assist during functional mobility compared to her reported baseline of indep. Pt would benefit from continued skilled PT services for maximizing independence and safety prior to & after discharge (MODERATE intensity).  End of Session Patient Position: Up in chair, Alarm on (call light and tray table within reach. LLE propped on makeshift stool with pillow on top, L heel floating off edge.)   IP OR SWING BED PT PLAN  Inpatient or Swing Bed: Inpatient  PT Plan  Treatment/Interventions: Bed mobility, Transfer training, Gait training, Strengthening, Therapeutic exercise, Therapeutic activity  PT Plan: Ongoing PT  PT - OK to Discharge: Yes      Subjective   General Visit Information:  General  Reason for Referral: Impaired functional mobility. This 84 year old was admitted for a mechanical fall which resulted in  "closed, minimally displaced fxs of L metarsals 3, 4 & 5.  Past Medical History Relevant to Rehab: anxiety, A-fib (paroysnmal, on Eliquis), HTN, dementia, lumbar spinal stenosis with neurogenic claudication, IBS, osteopenia, chronic back pain  Family/Caregiver Present: No  Prior to Session Communication: Bedside nurse  Patient Position Received: Bed, 3 rail up, Alarm off, not on at start of session  General Comment: Cleared by RN for participation. Pt agreed to session and was fully engaged throughout.    Home Living:  Home Living  Type of Home: [place]  Lives With: Alone (pet dog that had been her  son's)  Home Adaptive Equipment: Cane (straight)  Home Layout: One level  Home Access:  (1 threshold step from garage entrance without garb bar)  Bathroom Shower/Tub: Tub/shower unit  Bathroom Toilet: Standard  Bathroom Equipment: None  Home Living Comments: Dtr lived nearby and did not work. Pt also had sons.    Prior Level of Function:  Prior Function Per Pt/Caregiver Report  Receives Help From: Family (dtr)  ADL Assistance: Independent  Homemaking Assistance: Independent (EXCEPT vacuuming and medication management. 'My dtr doesn't trust me\" to organize pills.)  Ambulatory Assistance: Needs assistance  Transfers: Independent  Gait: Independent  Stairs:  (Mod I with hand rail and increased effort.)  Vocational: Retired ( at )  Prior Function Comments: (+) driving    Precautions:  Precautions  Hearing/Visual Limitations: Hearing WFL for age. Progressive lenses donned.  LE Weight Bearing Status:  ('LLE WBAT in surgical shoe with walker & try to maintain weight on L heel' per Dr. Jerome in podiatry consult note. Verbal communication via telephone earlier this morning with Dr. Jerome; he will place order once at a computer.)  Medical Precautions: Fall precautions  Precautions Comment: L flat surgical shoe donned throughout. Per pt, it was issued to her this admission.    Vital Signs (Past 2hrs)            "     Objective   Pain:  Pain Assessment  0-10 (Numeric) Pain Score: 0 - No pain (at rest. 6/10 in L foot with weightbearing or ankle/foot movements. No c/o at end of session.)  Pain Type: Acute pain  Pain Interventions: Repositioned (RN notified after session.)    Cognition:  Cognition  Overall Cognitive Status: Impaired at baseline (hx of dementia)  Orientation Level:  (Oriented to self and situation. Stated she at hospital but could not stated which one. Stated correct month and with increased time, and potentially using communication dry erase board, correct year.)  Following Commands: Follows one step commands with repetition (occasional repetition)  Memory:  (At least STM impaired. No recall of use of call light prior to initiating mobility <5 minutes after instruction.)  Impulsive:  (Not this session)    General Assessments:  Activity Tolerance  Endurance: Endurance does not limit participation in activity (L foot pain was the greatest barrier to increased standing duration.)    Sensation  Not tested; pt denied paresthesia now & reported intermittent LLE paresthesia from foot to mid thigh.    Strength  BLE: grossly >/=3+/5    Motor Control  Not formally assessed.  Movements are Fluid and Coordinated: Yes    Postural Control  Postural Control: Within Functional Limits (fwd head, protracted scapulae, obsese)    Static Sitting Balance  Static Sitting-Balance Support:  (BLE supported: mod I)  Dynamic Sitting Balance  Dynamic Sitting-Balance Support:  (BUE & unilateral LE supported: close S, mild compensatory trunk ext with knee ext or hip hip)    Static Standing Balance  Static Standing-Balance Support:  (LUE supported: CGA)  Dynamic Standing Balance  Dynamic Standing-Balance Support:  (BUE supported: CGA/Alberto x1, including intermittent walker mgmt.)    Functional Assessments:  Bed Mobility  Bed Mobility: Yes  Bed Mobility 1  Bed Mobility 1: Supine to sitting  Level of Assistance 1: Close supervision (bed flat, no  rail; toward L side)  Bed Mobility 2  Bed Mobility  2: Scooting (fwd while seated EOB)  Level of Assistance 2: Modified independent (used BUE)    Transfers  Transfer: Yes  Transfer 1  Technique 1: Sit to stand  Transfer Device 1: Walker (Jr FWW; EOB or bilat armrests)  Transfer Level of Assistance 1:  (ModA from EOB for walker stabilization and lifting torque. Min x1 for R foot start position then walker stabilization. Step-by-step sequencing for walker safety throughout.)  Trials/Comments 1: x2 trials, 1 failed trial at EOB  Transfers 2  Technique 2: Stand to sit  Transfer Device 2: Walker (Jr FWW; bilat armrests)  Transfer Level of Assistance 2: Contact guard (Maximal VCs for alignment with seat prior to initiation and BUE placement/walker safety. Aural cues also used.)  Transfers 3  Technique 3: Stand pivot, To left (EOB>chair)  Transfer Device 3: Walker  Transfer Level of Assistance 3:  (min/modA x1 as noted above for sit<>stand and Alberto x1 instance for walker management. Moderate VCs for sequencing stepping with walker movements and increased bilateral triceps activation during L stance.)    Ambulation/Gait Training  Ambulation/Gait Training Performed: Yes  Ambulation/Gait Training 1  Surface 1: Level tile  Device 1: Rolling walker  Assistance 1: Minimum assistance (intermittently for walkermanagement otherwise CGA at trunk. Maximal verbal/visual cues for walker management. Visual/verbal cue for L heel WB but pt c/o increased L forefoot pain thus aborted.)  Comments/Distance (ft) 1: 2 ft fwd then retro. Step through pattern, slow velocity, discontinuous movement. Cleared bilat feet. No overt threat for LOB.       Treatments:  Therapeutic Exercise  Seated EOB, BLE **except** DF/PF on R side only due to c/o L foot pain with L side.   -DF/PF   -knee ext   -hip flex (march).    Therapeutic Activity  -Educated pt in PT role, POC, disposition, fall precautions (use of call light for nursing staff assist/supervision  with mobility), and LLE WB status.  -See above for out of bed/lying time (total >/=18 minutes) to facilitate core strengthening & pulmonary hygiene via positioning.   -See above for 2nd trial of sit<>stand and consistent cues during all functional mobility.      Outcome Measures:  [facility] Basic Mobility  Turning from your back to your side while in a flat bed without using bedrails: A little  Moving from lying on your back to sitting on the side of a flat bed without using bedrails: A little  Moving to and from bed to chair (including a wheelchair): A lot  Standing up from a chair using your arms (e.g. wheelchair or bedside chair): A little  To walk in hospital room: A little  Climbing 3-5 steps with railing: A lot  Basic Mobility - Total Score: 16    Encounter Problems       Encounter Problems (Active)       PT Problem       Pt will transition supine<>sit with mod I.        Start:  12/13/24    Expected End:  12/21/24            Pt will transfer sit<>stand with Jr FWW, distant S & cues.       Start:  12/13/24    Expected End:  12/21/24            Pt will ambulate >/=25 ft with Jr FWW, distant S & cues.       Start:  12/13/24    Expected End:  12/21/24               Pain - Adult              Education Documentation  Precautions, taught by Suzi Hines PT at 12/13/2024 12:09 PM.  Learner: Patient  Readiness: Acceptance  Method: Explanation  Response: Needs Reinforcement  Comment: Will always require reinforcement due to impaired coginition. Fall precautions. LLE WB status.    Mobility Training, taught by Suzi Hines PT at 12/13/2024 12:09 PM.  Learner: Patient  Readiness: Acceptance  Method: Explanation  Response: Needs Reinforcement  Comment: Will always require reinforcement due to impaired coginition. Fall precautions. LLE WB status.    Education Comments  No comments found.

## 2024-12-13 NOTE — CONSULTS
Inpatient consult to Cardiology  Consult performed by: MILAN Linn-CNP  Consult ordered by: Ruth Cervantes MD  Reason for consult: fall, unsure if LOC        History Of Present Illness:    Mercedes Castellanos is a 84 y.o. female presenting with fall.  She follows Dr. Rosenberg for cardiology. Past medical history includes longstanding persistent atrial fibrillation on Eliquis as well as orthostatic hypotension which we are utilizing midodrine for.  The patient presented to the emergency department 2 days ago after a fall.  Patient reports to me that she is unsure of how the fall happened.  She states her daughter found her on the ground at home and she is unsure whether or not she lost consciousness.  Denies recent chest pain, pressure or palpitations.  She does report occasional shortness of breath with exertion. She reports current left foot pain.  Lab work on admission significant for sodium of 142, potassium 3.8, creatinine 0.71 and a hemoglobin of 13.8.  X-ray of the left ankle revealing an acute slightly angulated fracture of the third and fifth metatarsal necks with mild ulnar displacement but no acute osseous abnormality of the left ankle.  CT of the cervical spine with no acute intracranial abnormality or no acute fracture or traumatic subluxation of the cervical spine.  No EKG is available for my review.  She was given Tylenol in the emergency department and was admitted to the hospital on telemetry for further assessment and management.    Review of Systems   Musculoskeletal:  Positive for falls.          Last Recorded Vitals:  Vitals:    12/12/24 1603 12/12/24 1747 12/12/24 2300 12/13/24 0812   BP: (!) 173/91 (!) 154/93 153/66 149/86   BP Location:  Right arm Right arm Right arm   Patient Position:  Lying Lying Lying   Pulse: 78 68 110 74   Resp: 19 19 18 18   Temp:  36.5 °C (97.7 °F) 37.1 °C (98.8 °F) 37.1 °C (98.8 °F)   TempSrc:  Oral Oral Oral   SpO2: 97%  96% 97%   Weight:       Height:      "      Last Labs:  CBC - 12/13/2024:  6:07 AM  10.3 13.0 224    40.3      CMP - 12/13/2024:  6:07 AM  8.5 6.4 27 --- 0.4   _ 3.3 13 47      PTT - No results in last year.  _   _ _     Troponin I, High Sensitivity   Date/Time Value Ref Range Status   09/24/2024 01:41 AM 16 (H) 0 - 13 ng/L Final   09/24/2024 12:00 AM 16 (H) 0 - 13 ng/L Final     BNP   Date/Time Value Ref Range Status   09/24/2024 12:00  (H) 0 - 99 pg/mL Final   10/19/2017 11:51  (H) 0 - 99 pg/mL Final     Comment:     .  <100 pg/mL - Heart failure unlikely  100-299 pg/mL - Intermediate probability of acute heart  .               failure exacerbation. Correlate with clinical  .               context and patient history.    >=300 pg/mL - Heart Failure likely. Correlate with clinical  .               context and patient history.  BNP testing is performed using different testing   methodology at [facility] than at other   [facility] hospitals. Direct result comparisons should   only be made within the same method.       VLDL   Date/Time Value Ref Range Status   01/27/2023 11:16 AM 13 0 - 40 mg/dL Final   03/16/2021 10:05 AM 13 0 - 40 mg/dL Final   09/01/2020 10:36 AM 19 0 - 40 mg/dL Final      Last I/O:  No intake/output data recorded.    Past Cardiology Tests (Last 3 Years):  EKG:  Electrocardiogram, 12-lead PRN ACS symptoms 05/27/2024      ECG 12 lead 05/23/2024    Echo:  Transthoracic Echo (TTE) Complete 05/28/2024    Ejection Fractions:  No results found for: \"EF\"  Cath:  No results found for this or any previous visit from the past 1095 days.    Stress Test:  No results found for this or any previous visit from the past 1095 days.    Cardiac Imaging:  No results found for this or any previous visit from the past 1095 days.      Past Medical History:  She has a past medical history of Acute candidiasis of vulva and vagina (09/21/2018), Body mass index (BMI) 35.0-35.9, adult, Body mass index (BMI) 36.0-36.9, adult, Combined forms " of age-related cataract, bilateral (06/09/2022), Disorder of the skin and subcutaneous tissue, unspecified (01/12/2017), Dorsalgia, unspecified (12/09/2013), Dyskinesia of esophagus (09/16/2019), Elevated blood-pressure reading, without diagnosis of hypertension (04/08/2019), Encounter for screening for lipoid disorders (04/18/2019), Essential (primary) hypertension (09/10/2018), Irritable bowel syndrome with diarrhea (11/07/2019), Lumbago with sciatica, left side (04/24/2019), Mastodynia (09/26/2019), Menopausal and female climacteric states (08/19/2019), Other conditions influencing health status (08/30/2018), Other conditions influencing health status (09/09/2013), Other conditions influencing health status, Other conditions influencing health status, Other dysphagia (11/07/2019), Other dysphagia (12/10/2019), Other specified soft tissue disorders (06/26/2019), Pain in right hip (11/13/2019), Pain in right shoulder (06/26/2019), Pain in thoracic spine (08/26/2020), Personal history of other diseases of the circulatory system, Personal history of other diseases of the digestive system (10/03/2019), Personal history of other diseases of the musculoskeletal system and connective tissue (12/15/2020), Personal history of other drug therapy (10/12/2016), Personal history of other drug therapy (02/09/2021), Personal history of other endocrine, nutritional and metabolic disease (09/10/2018), Personal history of other medical treatment (07/20/2017), Personal history of other mental and behavioral disorders (08/19/2019), Personal history of other specified conditions (09/10/2018), Personal history of other specified conditions (05/01/2019), Personal history of other specified conditions (10/19/2017), Personal history of other specified conditions (07/07/2020), Personal history of other specified conditions (08/30/2018), Personal history of other specified conditions (09/23/2018), Personal history of urinary (tract)  infections (09/10/2018), Primary osteoarthritis, unspecified hand (07/07/2020), Sacrococcygeal disorders, not elsewhere classified (12/15/2020), Trigger finger, unspecified finger (10/12/2016), Unspecified atrial fibrillation (Multi) (10/08/2018), Unsteadiness on feet (03/11/2020), and Vitreous degeneration, bilateral (08/26/2019).    Past Surgical History:  She has a past surgical history that includes Esophagogastroduodenoscopy (03/11/2014); Gastric fundoplication (05/27/2014); Other surgical history (05/26/2022); Other surgical history (05/26/2022); and Other surgical history (05/26/2022).      Social History:  She reports that she has never smoked. She has never used smokeless tobacco. She reports that she does not currently use alcohol. She reports that she does not use drugs.    Family History:  Family History   Problem Relation Name Age of Onset    Diabetes Father      Coronary artery disease Sister      Coronary artery disease Brother      Glaucoma Brother      Coronary artery disease Other Family Hx     Ovarian cancer Sibling          Allergies:  Nitrofurantoin and Hydrocodone-acetaminophen    Inpatient Medications:  Scheduled medications   Medication Dose Route Frequency    apixaban  5 mg oral BID    atenolol  25 mg oral Daily    digoxin  125 mcg oral Once per day on Monday Wednesday Friday    escitalopram  10 mg oral Daily    magnesium oxide  400 mg oral Daily    midodrine  2.5 mg oral BID    pantoprazole  40 mg oral Daily before breakfast    vitamin B complex-vitamin C-folic acid  1 capsule oral Daily     PRN medications   Medication    traMADol     Continuous Medications   Medication Dose Last Rate     Outpatient Medications:  Current Outpatient Medications   Medication Instructions    apixaban (ELIQUIS) 5 mg, oral, 2 times daily    atenolol (TENORMIN) 25 mg, oral, Daily    cranberry 500 mg, Daily    digoxin (LANOXIN) 125 mcg, oral, 3 times weekly    escitalopram (LEXAPRO) 10 mg, oral, Daily     escitalopram (LEXAPRO) 10 mg, oral, Daily    midodrine (PROAMATINE) 2.5 mg, oral, 2 times daily (morning and late afternoon)    multivitamin tablet 1 tablet, Daily    omeprazole (PRILOSEC) 40 mg, oral, Daily before breakfast       Physical Exam  Vitals reviewed.   Cardiovascular:      Rate and Rhythm: Normal rate. Rhythm irregular.      Heart sounds: No murmur heard.     No friction rub. No gallop.   Pulmonary:      Effort: Pulmonary effort is normal.      Breath sounds: Normal breath sounds. No wheezing, rhonchi or rales.   Abdominal:      General: Bowel sounds are normal.      Palpations: Abdomen is soft.   Musculoskeletal:      Right lower leg: No edema.      Left lower leg: Edema present.   Neurological:      Mental Status: She is alert and oriented to person, place, and time.   Psychiatric:         Mood and Affect: Mood normal.         Behavior: Behavior normal.            Assessment/Plan   Mechanical Fall  Left Ankle Fracture  Possible Syncope  Longstanding Persistent Atrial Fibrillation on Eliquis  Orthostatic Hypotension    Impression and Plan:    12/13: As described above.  Patient unable to describe the events leading up to her fall.  She does tell me she suspects she lost consciousness as she was found on the ground by her daughter and does not remember the events of the fall.  Currently patient is resting comfortably in bed.  She is on room air with adequate pulse oximetry.  Remains in a rate controlled atrial fibrillation on telemetry without significant ectopy.  Blood pressures have been mildly elevated with last recorded 149/86.  She denies current lightheadedness or dizziness.  Denies current anginal type symptoms. She was evaluated by podiatry who are not recommending surgery at this time.  For her atrial fibrillation we are focusing on a rate control strategy with low dose atenolol 25 mg daily and digoxin 125 mcg three times weekly. Continue Eliquis twice daily for stroke risk reduction in the  setting of atrial fibrillation. Of note, she does have a history of orthostatic hypotension and is on midodrine for this.  Would check orthostatic vital signs.  Additionally I am going to check a limited echocardiogram.  She has been evaluated by neurology who are recommending an EEG. Will await echocardiogram results and follow with you.     Peripheral IV 12/12/24 24 G Left Hand (Active)   Site Assessment Clean;Dry;Intact 12/13/24 0820   Dressing Status Clean;Dry;Occlusive 12/13/24 0820   Number of days: 1       Code Status:  Full Code          Celsa Caldwell, APRN-CNP

## 2024-12-14 PROBLEM — I48.11 LONGSTANDING PERSISTENT ATRIAL FIBRILLATION (MULTI): Status: ACTIVE | Noted: 2024-12-14

## 2024-12-14 PROCEDURE — 99233 SBSQ HOSP IP/OBS HIGH 50: CPT | Performed by: INTERNAL MEDICINE

## 2024-12-14 PROCEDURE — 97530 THERAPEUTIC ACTIVITIES: CPT | Mod: GO,CO

## 2024-12-14 PROCEDURE — G0378 HOSPITAL OBSERVATION PER HR: HCPCS

## 2024-12-14 PROCEDURE — 99232 SBSQ HOSP IP/OBS MODERATE 35: CPT | Performed by: NURSE PRACTITIONER

## 2024-12-14 PROCEDURE — 99231 SBSQ HOSP IP/OBS SF/LOW 25: CPT | Performed by: STUDENT IN AN ORGANIZED HEALTH CARE EDUCATION/TRAINING PROGRAM

## 2024-12-14 PROCEDURE — 2500000001 HC RX 250 WO HCPCS SELF ADMINISTERED DRUGS (ALT 637 FOR MEDICARE OP): Performed by: INTERNAL MEDICINE

## 2024-12-14 PROCEDURE — 97535 SELF CARE MNGMENT TRAINING: CPT | Mod: GO,CO

## 2024-12-14 PROCEDURE — 2500000004 HC RX 250 GENERAL PHARMACY W/ HCPCS (ALT 636 FOR OP/ED): Performed by: INTERNAL MEDICINE

## 2024-12-14 ASSESSMENT — COGNITIVE AND FUNCTIONAL STATUS - GENERAL
DRESSING REGULAR LOWER BODY CLOTHING: A LITTLE
DAILY ACTIVITIY SCORE: 19
MOBILITY SCORE: 23
DAILY ACTIVITIY SCORE: 24
PERSONAL GROOMING: A LITTLE
DRESSING REGULAR UPPER BODY CLOTHING: A LITTLE
DAILY ACTIVITIY SCORE: 24
CLIMB 3 TO 5 STEPS WITH RAILING: A LITTLE
HELP NEEDED FOR BATHING: A LITTLE
MOBILITY SCORE: 23
TOILETING: A LITTLE
CLIMB 3 TO 5 STEPS WITH RAILING: A LITTLE

## 2024-12-14 ASSESSMENT — PAIN - FUNCTIONAL ASSESSMENT
PAIN_FUNCTIONAL_ASSESSMENT: 0-10
PAIN_FUNCTIONAL_ASSESSMENT: 0-10

## 2024-12-14 ASSESSMENT — ACTIVITIES OF DAILY LIVING (ADL)
BATHING_WHERE_ASSESSED: SITTING SINKSIDE;STANDING SINKSIDE
HOME_MANAGEMENT_TIME_ENTRY: 30
BATHING_LEVEL_OF_ASSISTANCE: CLOSE SUPERVISION

## 2024-12-14 ASSESSMENT — PAIN SCALES - GENERAL
PAINLEVEL_OUTOF10: 0 - NO PAIN
PAINLEVEL_OUTOF10: 2
PAINLEVEL_OUTOF10: 5 - MODERATE PAIN

## 2024-12-14 NOTE — PROGRESS NOTES
Occupational Therapy    OT Treatment    Patient Name: Mercedes Castellanos  MRN: [id number]  Department: [address]  Room: [address] [place] [address]  Today's Date: 12/14/2024  Time Calculation  Start Time: 1533  Stop Time: 1621  Time Calculation (min): 48 min        Assessment:  OT Assessment: Pt progressing with established POC.  Will continue to address remaining deficits with skilled OT intervention.  Prognosis: Good  Barriers to Discharge Home: No anticipated barriers  Evaluation/Treatment Tolerance: Patient tolerated treatment well  Medical Staff Made Aware: Yes  End of Session Communication: Bedside nurse  End of Session Patient Position: Up in chair, Alarm on (call light in reach Pt demonstrate use all needs met.)  OT Assessment Results: Decreased ADL status, Decreased functional mobility, Decreased IADLs  Prognosis: Good  Evaluation/Treatment Tolerance: Patient tolerated treatment well  Medical Staff Made Aware: Yes  Strengths: Ability to acquire knowledge, Attitude of self, Support of extended family/friends, Living arrangement secure  Barriers to Participation: Comorbidities  Plan:  Treatment Interventions: ADL retraining, Functional transfer training, Cognitive reorientation, Patient/family training, Compensatory technique education, Endurance training  OT Frequency: 3 times per week  OT Discharge Recommendations: Moderate intensity level of continued care  OT Recommended Transfer Status: Assist of 1  OT - OK to Discharge: Yes  Treatment Interventions: ADL retraining, Functional transfer training, Cognitive reorientation, Patient/family training, Compensatory technique education, Endurance training    Subjective   Previous Visit Info:  OT Last Visit  OT Received On: 12/14/24  General:  General  Reason for Referral: Pt is an 84 year old was admitted for a mechanical fall which resulted in closed, minimally displaced fxs of L metarsals 3, 4 & 5.  Past Medical History Relevant to Rehab: anxiety, A-fib (paroysnmal, on Eliquis), HTN,  dementia, lumbar spinal stenosis with neurogenic claudication, IBS, osteopenia, chronic back pain  Prior to Session Communication: Bedside nurse  Patient Position Received: Bed, 3 rail up, Alarm on  General Comment: Pt cleared by NSG and is agreeable to skilled OT intervention  Precautions:  Hearing/Visual Limitations: Hearing WFL for age. Progressive lenses donned.  LE Weight Bearing Status: Other (Comment) ('LLE WBAT in surgical shoe with walker & try to maintain weight on L heel' per PT/Dr. Jerome.)  Medical Precautions: Fall precautions    Vital Signs (Past 2hrs)                 Pain:  Pain Assessment  Pain Assessment: 0-10  0-10 (Numeric) Pain Score: 5 - Moderate pain  Pain Type: Acute pain  Pain Location: Foot  Pain Orientation: Left  Pain Interventions: Repositioned  Response to Interventions: No change in pain    Objective    Cognition:  Cognition  Overall Cognitive Status: Impaired at baseline  Orientation Level: Oriented X4  Following Commands: Follows one step commands without difficulty  Memory: Exceptions to WFL (sligh STM deficits present)  Impulsive: Mildly  Coordination:  Movements are Fluid and Coordinated: Yes  Activities of Daily Living:      Grooming  Grooming Level of Assistance: Setup  Grooming Where Assessed: Standing sinkside  Grooming Comments: Pt brushing teeth (lower present ), washing hands item in reach    UE Bathing  UE Bathing Level of Assistance: Setup  UE Bathing Where Assessed: Sitting sinkside  UE Bathing Comments: Pt sponge bathing  all task seated    LE Bathing  LE Bathing Level of Assistance: Close supervision  LE Bathing Where Assessed: Sitting sinkside, Standing sinkside  LE Bathing Comments: Pt sponge bathing vc hand placement unilaterally in stance , figure four used  B feet    UE Dressing  UE Dressing Level of Assistance: Close supervision  UE Dressing Where Assessed: Other (Comment) (stance at commode)  UE Dressing Comments: Pt doff/don [facility] gown    LE Dressing  LE  Dressing: Yes  Pants Level of Assistance: Close supervision (Pt seated thread stance at RW)  Sock Level of Assistance: Setup (Pt using figure four doff/don socks)  Shoe Level of Assistance: Setup (Pt doff/don surgical shoe)  LE Dressing Where Assessed: Toilet  LE Dressing Comments: Pt demonstrate efficiency of task however easily distracted    Toileting  Toileting Level of Assistance: Close supervision  Where Assessed: Toilet  Toileting Comments: Pt adjusting clothing prior/after/hygiene  Functional Standing Tolerance:  Time: 4 minutes  Activity: ADL skills/ transfers  Functional Standing Tolerance Comments: F balance  Bed Mobility/Transfers: Bed Mobility  Bed Mobility: Yes  Bed Mobility 1  Bed Mobility 1: Supine to sitting  Level of Assistance 1: Modified independent  Bed Mobility Comments 1: bed to neural  Bed Mobility 2  Bed Mobility  2: Scooting  Level of Assistance 2: Modified independent  Bed Mobility Comments 2: seated to edge of bed    Transfers  Transfer: Yes  Transfer 1  Transfer From 1: Bed to  Transfer to 1: Stand  Technique 1: Sit to stand  Transfer Device 1: Walker  Transfer Level of Assistance 1: Contact guard  Trials/Comments 1: vc hand placement  Transfers 2  Transfer From 2: Stand to  Transfer to 2: Chair with arms  Technique 2: Stand to sit  Transfer Device 2: Walker  Transfer Level of Assistance 2: Close supervision  Trials/Comments 2: vc hand placement    Toilet Transfers  Toilet Transfer From: Rolling walker  Toilet Transfer Type: To  Toilet Transfer to: Standard toilet  Toilet Transfer Technique: Ambulating  Toilet Transfers: Contact guard  Toilet Transfers Comments: grab bar use vc hand placement     Functional Mobility:  Functional Mobility  Functional Mobility Performed: Yes  Functional Mobility 1  Surface 1: Level tile  Device 1: Rolling walker  Assistance 1: Minimum assistance  Quality of Functional Mobility 1: Inconsistent stride length  Comments 1: 15' x 2 to include doorway, turns and  backing Pt preference no AD however unsteady    Outcome Measures:[facility] Daily Activity  Putting on and taking off regular lower body clothing: A little  Bathing (including washing, rinsing, drying): A little  Putting on and taking off regular upper body clothing: A little  Toileting, which includes using toilet, bedpan or urinal: A little  Taking care of personal grooming such as brushing teeth: A little  Eating Meals: None  Daily Activity - Total Score: 19    Education Documentation  ADL Training, taught by SAUD Costa at 12/14/2024  4:39 PM.  Learner: Patient  Readiness: Eager  Method: Explanation, Demonstration, Teach-back  Response: Verbalizes Understanding, Demonstrated Understanding  Comment: Instructed Pt with safety in transfers, balance strategies, body mechanics and adaptive ADL skills.    Education Comments  No comments found.        OP EDUCATION:       Goals:  Encounter Problems       Encounter Problems (Active)       ADL       Goal 1 (Progressing)       Start:  12/13/24    Expected End:  12/27/24       Patient will demonstrate improved ADL skills:  Bathing with CG A assist with adaptive equipment/DME prn     Grooming with supervision    UE Dressing with supervision assist           LE Dressing with CG A assist with adaptive equipment/DME prn     Toileting with CG A assist with adaptive equipment/DME prn

## 2024-12-14 NOTE — CARE PLAN
The patient's goals for the shift include MAINTAIN SAFETY    The clinical goals for the shift include Pain Control    Over the shift, the patient did not make progress toward the following goals. Barriers to progression include . Recommendations to address these barriers include   Problem: Pain - Adult  Goal: Verbalizes/displays adequate comfort level or baseline comfort level  Outcome: Progressing     Problem: Safety - Adult  Goal: Free from fall injury  Outcome: Progressing     Problem: Fall/Injury  Goal: Not fall by end of shift  Outcome: Progressing  Goal: Be free from injury by end of the shift  Outcome: Progressing  Goal: Verbalize understanding of personal risk factors for fall in the hospital  Outcome: Progressing  Goal: Verbalize understanding of risk factor reduction measures to prevent injury from fall in the home  Outcome: Progressing  Goal: Use assistive devices by end of the shift  Outcome: Progressing  Goal: Pace activities to prevent fatigue by end of the shift  Outcome: Progressing     Problem: Pain  Goal: Takes deep breaths with improved pain control throughout the shift  Outcome: Progressing  Goal: Turns in bed with improved pain control throughout the shift  Outcome: Progressing  Goal: Walks with improved pain control throughout the shift  Outcome: Progressing  Goal: Performs ADL's with improved pain control throughout shift  Outcome: Progressing  Goal: Participates in PT with improved pain control throughout the shift  Outcome: Progressing  Goal: Free from opioid side effects throughout the shift  Outcome: Progressing  Goal: Free from acute confusion related to pain meds throughout the shift  Outcome: Progressing   .

## 2024-12-14 NOTE — PROGRESS NOTES
"Mercedes Castellanos is a 84 y.o. female on day 1 of admission presenting with Closed fracture of left foot, initial encounter.      Subjective   Pt seen by bedside. Reports doing well. Wishes to go home.        Objective     Last Recorded Vitals  Blood pressure 165/89, pulse 81, temperature 36.7 °C (98.1 °F), temperature source Oral, resp. rate 18, height 1.6 m (5' 3\"), weight 78.5 kg (173 lb), SpO2 96%.    Physical Exam  Neurological Exam  AAOx3, follows commands, no aphasia/dysarthria.  PERRL, VFF,  EOMI, normal saccades and pursuit, no gaze preference/nystagmus.   Intact facial sensation, no asymmetry. Intact hearing bilaterally. Tongue midline. Symmetric palate elevation.   Motor: 5/5 all four extremities except L knee from pain and L ankle on brace   Sensation: intact to light touch   Gait: deferred       Relevant Results                    [place] Coma Scale  Best Eye Response: Spontaneous  Best Verbal Response: Oriented  Best Motor Response: Follows commands  [place] Coma Scale Score: 15                             Assessment/Plan      Assessment & Plan  Closed fracture of left foot, initial encounter    Fall, initial encounter    Mercedes Castellanos is a 84 y.o. female presenting with a fall resulting in foot fracture. Mechanism unclear and patient does not recollect the event, would be safer to assume she lost consciousness. Ddx includes syncope based on her description of lightheadedness prior, but would still evaluate for seizure with a routine EEG. Results pending but preliminarily negative.      Dx. Fall, unknown mechanism  Possible LOC associated      Recs:  - will follow up on final EEG results, once negative pt is cleared for discharge          I spent 15 minutes in the professional and overall care of this patient.      Ronald Villagomez MD  "

## 2024-12-14 NOTE — PROGRESS NOTES
Spiritual Care Visit  Spiritual Care Request    Reason for Visit:  Routine Visit: Introduction     Request Received From:       Focus of Care:  Visited With: Patient         Refer to :          Spiritual Care Assessment    Spiritual Assessment:                      Care Provided:       Sense of Community and or Yarsanism Affiliation:  Jew   Values/Beliefs  Spiritual Requests During Hospitalization: Mercedes asked for a blessing but no scraments from the [employer].     Addressed Needs/Concerns and/or [place] Through:     Sacramental Encounters  Communion: Does not want communion  Communion Given Indicator: No  Sacrament of Sick-Anointing: Patient declined anointing    Outcome:        Advance Directives:         Spiritual Care Annotation    Annotation:  Mercedes asked for a blessing but no sacramanets from the [employer].  Thomas Puentes

## 2024-12-14 NOTE — PROGRESS NOTES
12/14/24 0826   Discharge Planning   Expected Discharge Disposition SNF     Discharge plan is not secure.  Awaiting clearance from cardiology and neurology.  Still need SNF choices and precert.

## 2024-12-14 NOTE — PROGRESS NOTES
"eMrcedes Castellanos is a 84 y.o. female on day 1 of admission presenting with Closed fracture of left foot, initial encounter.    Subjective   Patient resting in bed. Reports pain in her left ankle. Denies any chest pain, palpitations, SOB, lightheadedness, or dizziness.        Objective     Physical Exam  Vitals reviewed.   Constitutional:       General: She is not in acute distress.     Appearance: Normal appearance. She is obese. She is not ill-appearing, toxic-appearing or diaphoretic.   HENT:      Head: Normocephalic and atraumatic.      Nose: Nose normal.      Mouth/Throat:      Mouth: Mucous membranes are moist.   Eyes:      Extraocular Movements: Extraocular movements intact.   Cardiovascular:      Rate and Rhythm: Normal rate. Rhythm irregular.      Pulses: Normal pulses.      Heart sounds: Normal heart sounds.   Pulmonary:      Effort: Pulmonary effort is normal.      Breath sounds: Normal breath sounds.   Abdominal:      General: Bowel sounds are normal. There is no distension.      Palpations: Abdomen is soft.      Tenderness: There is no abdominal tenderness.   Musculoskeletal:         General: Normal range of motion.      Cervical back: Normal range of motion and neck supple.      Right lower leg: No edema.      Left lower leg: Edema present.      Comments: L foot in boot   Skin:     General: Skin is warm and dry.      Capillary Refill: Capillary refill takes less than 2 seconds.   Neurological:      General: No focal deficit present.      Mental Status: She is alert.   Psychiatric:         Mood and Affect: Mood normal.         Behavior: Behavior normal.         Last Recorded Vitals  Blood pressure 165/89, pulse 81, temperature 36.7 °C (98.1 °F), temperature source Oral, resp. rate 18, height 1.6 m (5' 3\"), weight 78.5 kg (173 lb), SpO2 96%.  Intake/Output last 3 Shifts:  No intake/output data recorded.    Relevant Results            Scheduled medications  apixaban, 5 mg, oral, BID  atenolol, 25 mg, oral, " Daily  digoxin, 125 mcg, oral, Once per day on Monday Wednesday Friday  escitalopram, 10 mg, oral, Daily  magnesium oxide, 400 mg, oral, Daily  midodrine, 2.5 mg, oral, BID  pantoprazole, 40 mg, oral, Daily before breakfast  vitamin B complex-vitamin C-folic acid, 1 capsule, oral, Daily      Continuous medications     PRN medications  PRN medications: traMADol     Results for orders placed or performed during the hospital encounter of 12/11/24 (from the past 24 hours)   Transthoracic Echo (TTE) Limited   Result Value Ref Range    AV pk lauren 1.17 m/s    LVOT diam 1.95 cm    AV mn grad 3 mmHg    LV Biplane EF 51 %    LV EF 58 %    RVSP 37.4 mmHg    AV pk grad 5 mmHg    Aortic Valve Area by Continuity of VTI 1.75 cm2    Aortic Valve Area by Continuity of Peak Velocity 1.90 cm2    LV A4C EF 50.8      *Note: Due to a large number of results and/or encounters for the requested time period, some results have not been displayed. A complete set of results can be found in Results Review.      Transthoracic Echo (TTE) Limited    Result Date: 12/13/2024           [place] [address]            Phone [phone] TRANSTHORACIC ECHOCARDIOGRAM REPORT Patient Name:       DL Alberts Physician:    [id number] Tor Medley MD Study Date:         12/13/2024           Ordering Provider:    [id number] SILVANO LUNDBERG MRN/PID:            [id number]             Fellow: Accession#:         [id number]         Nurse: Date of Birth/Age:  1940 / 84 years Sonographer:          Wild Harrington RDCS Gender Assigned at  F                    Additional Staff: Birth: Height:             160.00 cm            Admit Date: Weight:             79.00 kg             Admission Status:     Inpatient -                                                                 Routine BSA / BMI:          1.82 m2 / 30.86      Department Location:  [facility]                     kg/m2 Blood Pressure: 149 /86 mmHg Study Type:    TRANSTHORACIC ECHO (TTE) LIMITED Diagnosis/ICD: Syncope-R55 Indication:    Syncope CPT Codes:     Echo Limited-51624; Doppler Full-04215; Color Doppler-36303 Patient History: Pertinent History: A-Fib, Chest Pain, Dyspnea, HTN, Hyperlipidemia, Murmur, LE                    Edema and Syncope. Study Detail: The following Echo studies were performed: 2D, M-Mode, Doppler and               color flow. Unable to obtain suprasternal notch, subcostal and               Limited Exam view.  PHYSICIAN INTERPRETATION: Left Ventricle: Left ventricular ejection fraction is normal, by visual estimate at 55-60%. There are no regional wall motion abnormalities. The left ventricular cavity size is normal. Spectral Doppler shows a Grade I (impaired relaxation pattern) of left ventricular diastolic filling with normal left atrial filling pressure. Left Atrium: The left atrium is upper limits of normal in size. Right Ventricle: The right ventricle is normal in size. There is normal right ventricular global systolic function. Right Atrium: The right atrium is upper limits of normal in size. Aortic Valve: The aortic valve is probably trileaflet. There is evidence of mildly elevated transaortic gradients consistent with sclerosis of the aortic valve. The aortic valve dimensionless index is 0.58. There is trace aortic valve regurgitation. The peak instantaneous gradient of the aortic valve is 5 mmHg. The mean gradient of the aortic valve is 3 mmHg. Mitral Valve: The mitral valve is normal in structure. The peak instantaneous gradient of the mitral valve is 4 mmHg. There is trace mitral valve regurgitation. Tricuspid Valve: The tricuspid valve is structurally normal. There is mild tricuspid regurgitation. Pulmonic Valve: The pulmonic valve is not  well visualized. There is trace pulmonic valve regurgitation. Pericardium: Trivial pericardial effusion. Aorta: The aortic root was not well visualized.  CONCLUSIONS:  1. Left ventricular ejection fraction is normal, by visual estimate at 55-60%.  2. Spectral Doppler shows a Grade I (impaired relaxation pattern) of left ventricular diastolic filling with normal left atrial filling pressure.  3. There is normal right ventricular global systolic function.  4. Aortic valve sclerosis. QUANTITATIVE DATA SUMMARY:  LA VOLUME:                   Normal Ranges: LA Vol A4C:       122.9 ml LA Vol A2C:       100.9 ml LA Vol Index BSA: 61.4 ml/m2  RA VOLUME BY A/L METHOD:          Normal Ranges: RA Area A4C:             22.3 cm2  LV SYSTOLIC FUNCTION BY 2D PLANIMETRY (MOD):                      Normal Ranges: EF-A4C View:    51 % (>=55%) EF-A2C View:    44 % EF-Biplane:     51 % EF-Visual:      58 % EF-3DQ:         57 % LV EF Reported: 58 %  MITRAL VALVE:          Normal Ranges: MV Vmax:      0.96 m/s (<=1.3m/s) MV peak PG:   3.7 mmHg (<5mmHg) MV mean P.5 mmHg (<48mmHg)  AORTIC VALVE:                     Normal Ranges: AoV Vmax:                1.17 m/s (<=1.7m/s) AoV Peak P.5 mmHg (<20mmHg) AoV Mean PG:             3.0 mmHg (1.7-11.5mmHg) LVOT Max Fabián:            0.74 m/s (<=1.1m/s) AoV VTI:                 22.59 cm (18-25cm) LVOT VTI:                13.19 cm LVOT Diameter:           1.95 cm  (1.8-2.4cm) AoV Area, VTI:           1.75 cm2 (2.5-5.5cm2) AoV Area,Vmax:           1.90 cm2 (2.5-4.5cm2) AoV Dimensionless Index: 0.58  RIGHT VENTRICLE: RV Basal 3.90 cm RV Mid   2.80 cm RV Major 4.5 cm  TRICUSPID VALVE/RVSP:          Normal Ranges: Peak TR Velocity:     2.93 m/s RV Syst Pressure:     37 mmHg  (< 30mmHg)  PULMONIC VALVE:          Normal Ranges: PV Max Fabián:     1.0 m/s  (0.6-0.9m/s) PV Max P.0 mmHg PV Mean P.1 mmHg PV VTI:         21.70 cm  [id number] Tor Medley MD Electronically signed on  12/13/2024 at 11:39:53 AM  ** Final **                 Assessment/Plan   Assessment & Plan  Closed fracture of left foot, initial encounter    Fall, initial encounter    Mechanical Fall  Left Ankle Fracture  Possible Syncope  Longstanding Persistent Atrial Fibrillation on Eliquis  Orthostatic Hypotension     Impression and Plan:     12/13: As described above.  Patient unable to describe the events leading up to her fall.  She does tell me she suspects she lost consciousness as she was found on the ground by her daughter and does not remember the events of the fall.  Currently patient is resting comfortably in bed.  She is on room air with adequate pulse oximetry.  Remains in a rate controlled atrial fibrillation on telemetry without significant ectopy.  Blood pressures have been mildly elevated with last recorded 149/86.  She denies current lightheadedness or dizziness.  Denies current anginal type symptoms. She was evaluated by podiatry who are not recommending surgery at this time.  For her atrial fibrillation we are focusing on a rate control strategy with low dose atenolol 25 mg daily and digoxin 125 mcg three times weekly. Continue Eliquis twice daily for stroke risk reduction in the setting of atrial fibrillation. Of note, she does have a history of orthostatic hypotension and is on midodrine for this.  Would check orthostatic vital signs.  Additionally I am going to check a limited echocardiogram.  She has been evaluated by neurology who are recommending an EEG. Will await echocardiogram results and follow with you.     12/14: Limited TTE yesterday with EF 55-60%. Negative orthostatic VS. Blood pressure elevated this AM at 165/89 prior to medications. SpO2 96% on RA. Telemetry with rate controlled atrial fibrillation without significant ectopy. No new labs this AM. She is on a regimen of atenolol 25mg daily, digoxin 125 mcg PO TID, and midodrine 2.5mg BID PO. She is also on Eliquis for stroke risk reduction.  Neurology is following and is awaiting EEG. She can be discharged from a cardiac perspective with outpatient monitor. She will need to follow up with Dr. Rosenberg outpatient. Will discuss with my collaborating physician Dr. Alonso.         I spent 35 minutes in the professional and overall care of this patient.      Emma Turk, APRN-CNP

## 2024-12-14 NOTE — PROGRESS NOTES
Mercedes Castellanos is a 84 y.o. female on day 1 of admission presenting with Closed fracture of left foot, initial encounter.    Subjective   Remains afebrile, no chills  Reports mild to moderate pain of her left foot  Denies shortness of breath  Denies nausea, vomiting, diarrhea  Denies dysuria, urinary urgency or frequency    Objective   Range of Vitals (last 24 hours)  Heart Rate:  []   Temp:  [36.6 °C (97.9 °F)-37.3 °C (99.1 °F)]   Resp:  [16-18]   BP: (129-165)/(66-89)   SpO2:  [93 %-96 %]   Daily Weight  12/11/24 : 78.5 kg (173 lb)    Body mass index is 30.65 kg/m².    Physical Exam  Constitutional:       Appearance: Normal appearance.   HENT:      Head: Normocephalic and atraumatic.   Eyes:      Extraocular Movements: Extraocular movements intact.      Conjunctiva/sclera: Conjunctivae normal.   Cardiovascular:      Rate and Rhythm: Normal rate and regular rhythm.   Pulmonary:      Effort: Pulmonary effort is normal.      Breath sounds: Normal breath sounds.   Abdominal:      General: Bowel sounds are normal.      Palpations: Abdomen is soft.   Musculoskeletal:         General: Normal range of motion.      Cervical back: Normal range of motion and neck supple.      Comments: Left foot dressing and boot intact   Skin:     General: Skin is warm and dry.   Neurological:      General: No focal deficit present.      Mental Status: She is alert and oriented to person, place, and time.   Psychiatric:         Mood and Affect: Mood normal.         Behavior: Behavior normal.           Antibiotics  This patient does not have an active medication from one of the medication groupers.    Relevant Results  Labs  Results from last 72 hours   Lab Units 12/13/24  0607 12/12/24  1822   WBC AUTO x10*3/uL 10.3 12.4*   HEMOGLOBIN g/dL 13.0 13.8   HEMATOCRIT % 40.3 41.9   PLATELETS AUTO x10*3/uL 224 238   NEUTROS PCT AUTO %  --  77.1   LYMPHS PCT AUTO %  --  13.5   MONOS PCT AUTO %  --  7.2   EOS PCT AUTO %  --  1.5     Results  from last 72 hours   Lab Units 12/13/24  0607 12/12/24  1822   SODIUM mmol/L 142 142   POTASSIUM mmol/L 3.8 3.8   CHLORIDE mmol/L 107 109*   CO2 mmol/L 31 26   BUN mg/dL 14 14   CREATININE mg/dL 0.78 0.71   GLUCOSE mg/dL 93 92   CALCIUM mg/dL 8.5* 9.0   ANION GAP mmol/L 8* 11   EGFR mL/min/1.73m*2 75 84         Estimated Creatinine Clearance: 53.2 mL/min (by C-G formula based on SCr of 0.78 mg/dL).  C-Reactive Protein   Date Value Ref Range Status   09/24/2024 4.96 (H) <1.00 mg/dL Final     Microbiology  No results found for the last 14 days.    Imaging  Transthoracic Echo (TTE) Limited    Result Date: 12/13/2024           [place] [address]            Phone [phone] TRANSTHORACIC ECHOCARDIOGRAM REPORT Patient Name:       DL BRADSHAW     Reading Physician:    [id number] Tor Medley MD Study Date:         12/13/2024           Ordering Provider:    [id number] SILVANO LUNDBERG MRN/PID:            [id number]             Fellow: Accession#:         [id number]         Nurse: Date of Birth/Age:  1940 / 84 years Sonographer:          Wild Harrington RDCS Gender Assigned at  F                    Additional Staff: Birth: Height:             160.00 cm            Admit Date: Weight:             79.00 kg             Admission Status:     Inpatient -                                                                Routine BSA / BMI:          1.82 m2 / 30.86      [facility]                     kg/m2 Blood Pressure: 149 /86 mmHg Study Type:    TRANSTHORACIC ECHO (TTE) LIMITED Diagnosis/ICD: Syncope-R55 Indication:    Syncope CPT Codes:     Echo Limited-82870; Doppler Full-10373; Color Doppler-72794 Patient History: Pertinent History: A-Fib, Chest Pain, Dyspnea, HTN,  Hyperlipidemia, Murmur, LE                    Edema and Syncope. Study Detail: The following Echo studies were performed: 2D, M-Mode, Doppler and               color flow. Unable to obtain suprasternal notch, subcostal and               Limited Exam view.  PHYSICIAN INTERPRETATION: Left Ventricle: Left ventricular ejection fraction is normal, by visual estimate at 55-60%. There are no regional wall motion abnormalities. The left ventricular cavity size is normal. Spectral Doppler shows a Grade I (impaired relaxation pattern) of left ventricular diastolic filling with normal left atrial filling pressure. Left Atrium: The left atrium is upper limits of normal in size. Right Ventricle: The right ventricle is normal in size. There is normal right ventricular global systolic function. Right Atrium: The right atrium is upper limits of normal in size. Aortic Valve: The aortic valve is probably trileaflet. There is evidence of mildly elevated transaortic gradients consistent with sclerosis of the aortic valve. The aortic valve dimensionless index is 0.58. There is trace aortic valve regurgitation. The peak instantaneous gradient of the aortic valve is 5 mmHg. The mean gradient of the aortic valve is 3 mmHg. Mitral Valve: The mitral valve is normal in structure. The peak instantaneous gradient of the mitral valve is 4 mmHg. There is trace mitral valve regurgitation. Tricuspid Valve: The tricuspid valve is structurally normal. There is mild tricuspid regurgitation. Pulmonic Valve: The pulmonic valve is not well visualized. There is trace pulmonic valve regurgitation. Pericardium: Trivial pericardial effusion. Aorta: The aortic root was not well visualized.  CONCLUSIONS:  1. Left ventricular ejection fraction is normal, by visual estimate at 55-60%.  2. Spectral Doppler shows a Grade I (impaired relaxation pattern) of left ventricular diastolic filling with normal left atrial filling pressure.  3. There is normal right  ventricular global systolic function.  4. Aortic valve sclerosis. QUANTITATIVE DATA SUMMARY:  LA VOLUME:                   Normal Ranges: LA Vol A4C:       122.9 ml LA Vol A2C:       100.9 ml LA Vol Index BSA: 61.4 ml/m2  RA VOLUME BY A/L METHOD:          Normal Ranges: RA Area A4C:             22.3 cm2  LV SYSTOLIC FUNCTION BY 2D PLANIMETRY (MOD):                      Normal Ranges: EF-A4C View:    51 % (>=55%) EF-A2C View:    44 % EF-Biplane:     51 % EF-Visual:      58 % EF-3DQ:         57 % LV EF Reported: 58 %  MITRAL VALVE:          Normal Ranges: MV Vmax:      0.96 m/s (<=1.3m/s) MV peak PG:   3.7 mmHg (<5mmHg) MV mean P.5 mmHg (<48mmHg)  AORTIC VALVE:                     Normal Ranges: AoV Vmax:                1.17 m/s (<=1.7m/s) AoV Peak P.5 mmHg (<20mmHg) AoV Mean PG:             3.0 mmHg (1.7-11.5mmHg) LVOT Max Fabián:            0.74 m/s (<=1.1m/s) AoV VTI:                 22.59 cm (18-25cm) LVOT VTI:                13.19 cm LVOT Diameter:           1.95 cm  (1.8-2.4cm) AoV Area, VTI:           1.75 cm2 (2.5-5.5cm2) AoV Area,Vmax:           1.90 cm2 (2.5-4.5cm2) AoV Dimensionless Index: 0.58  RIGHT VENTRICLE: RV Basal 3.90 cm RV Mid   2.80 cm RV Major 4.5 cm  TRICUSPID VALVE/RVSP:          Normal Ranges: Peak TR Velocity:     2.93 m/s RV Syst Pressure:     37 mmHg  (< 30mmHg)  PULMONIC VALVE:          Normal Ranges: PV Max Fabián:     1.0 m/s  (0.6-0.9m/s) PV Max P.0 mmHg PV Mean P.1 mmHg PV VTI:         21.70 cm  [id number] Tor Medley MD Electronically signed on 2024 at 11:39:53 AM  ** Final **     CT head wo IV contrast    Result Date: 2024  Interpreted By:  Eduarda Denson, STUDY: CT HEAD WO IV CONTRAST; CT CERVICAL SPINE WO IV CONTRAST;  2024 12:53 am   INDICATION: Signs/Symptoms:head injury; Signs/Symptoms:head injury after fall.   COMPARISON: Head CT 2024   ACCESSION NUMBER(S): [id number]; [id number]   ORDERING CLINICIAN: EULALIA MORGAN    TECHNIQUE: Noncontrast CT images of head. Axial noncontrast CT images of the cervical spine with coronal and sagittal reconstructed images.   FINDINGS: BRAIN PARENCHYMA:  Gray-white matter interfaces are  preserved. No mass effect or midline shift.   HEMORRHAGE: No acute intracranial hemorrhage. VENTRICLES and EXTRA-AXIAL SPACES: The ventricles and sulci are within normal limits in size for brain volume. No abnormal extraaxial fluid collection. EXTRACRANIAL SOFT TISSUES: Within normal limits. PARANASAL SINUSES/MASTOIDS:  The visualized paranasal sinuses and mastoid air cells are aerated. CALVARIUM: No depressed skull fracture. No destructive osseous lesion.   OTHER FINDINGS: None.   CERVICAL SPINE:   ALIGNMENT: Normal. VERTEBRAE: No acute fracture. SPINAL CANAL: Mild multilevel degenerative disc changes. No critical spinal canal stenosis. PREVERTEBRAL SOFT TISSUES: No prevertebral soft tissue swelling. LUNG APICES: Imaged portion of the lung apices are within normal limits.   OTHER FINDINGS: None.       No acute intracranial abnormality.   No acute fracture or traumatic subluxation of the cervical spine.       MACRO: None   Signed by: Eduarda Denson 12/12/2024 1:37 AM Dictation workstation:   [id number]    CT cervical spine wo IV contrast    Result Date: 12/12/2024  Interpreted By:  Eduarda Denson, STUDY: CT HEAD WO IV CONTRAST; CT CERVICAL SPINE WO IV CONTRAST;  12/12/2024 12:53 am   INDICATION: Signs/Symptoms:head injury; Signs/Symptoms:head injury after fall.   COMPARISON: Head CT 09/24/2024   ACCESSION NUMBER(S): [id number]; [id number]   ORDERING CLINICIAN: EULALIA MORGAN   TECHNIQUE: Noncontrast CT images of head. Axial noncontrast CT images of the cervical spine with coronal and sagittal reconstructed images.   FINDINGS: BRAIN PARENCHYMA:  Gray-white matter interfaces are  preserved. No mass effect or midline shift.   HEMORRHAGE: No acute intracranial hemorrhage. VENTRICLES and EXTRA-AXIAL SPACES: The  ventricles and sulci are within normal limits in size for brain volume. No abnormal extraaxial fluid collection. EXTRACRANIAL SOFT TISSUES: Within normal limits. PARANASAL SINUSES/MASTOIDS:  The visualized paranasal sinuses and mastoid air cells are aerated. CALVARIUM: No depressed skull fracture. No destructive osseous lesion.   OTHER FINDINGS: None.   CERVICAL SPINE:   ALIGNMENT: Normal. VERTEBRAE: No acute fracture. SPINAL CANAL: Mild multilevel degenerative disc changes. No critical spinal canal stenosis. PREVERTEBRAL SOFT TISSUES: No prevertebral soft tissue swelling. LUNG APICES: Imaged portion of the lung apices are within normal limits.   OTHER FINDINGS: None.       No acute intracranial abnormality.   No acute fracture or traumatic subluxation of the cervical spine.       MACRO: None   Signed by: Eduarda Denson 12/12/2024 1:37 AM Dictation workstation:   [id number]    XR foot left 3+ views    Result Date: 12/11/2024  Interpreted By:  Delano Galeana, STUDY: XR ANKLE LEFT 3+ VIEWS; XR FOOT LEFT 3+ VIEWS; ;  12/11/2024 10:20 pm   INDICATION: Signs/Symptoms:fall and lateral swelling; Signs/Symptoms:fall, pain to the top of the foot.     COMPARISON: None.   ACCESSION NUMBER(S): [id number]; [id number]   ORDERING CLINICIAN: EULALIA MORGAN   FINDINGS: LEFT ANKLE: Plantar calcaneal spur. No acute fracture. The ankle mortise is symmetric without medial or lateral clear space widening. The tibiotalar and subtalar joints are intact.   LEFT FOOT: There is no acute fracture of the 5th metatarsal neck, 4th metatarsal neck, and 3rd metatarsal neck. All fractures are angulated in the lateral direction slightly. The Lisfranc joint is intact. There is soft tissue swelling over the distal forefoot.       Acute slightly angulated fractures of the 3rd-5th metatarsal necks with mild ulnar displacement.   No acute osseous abnormality of the left ankle.   MACRO: None.   Signed by: Delano Galeana 12/11/2024 11:12 PM  Dictation workstation:   [id number]    XR ankle left 3+ views    Result Date: 12/11/2024  Interpreted By:  Delano Galeana, STUDY: XR ANKLE LEFT 3+ VIEWS; XR FOOT LEFT 3+ VIEWS; ;  12/11/2024 10:20 pm   INDICATION: Signs/Symptoms:fall and lateral swelling; Signs/Symptoms:fall, pain to the top of the foot.     COMPARISON: None.   ACCESSION NUMBER(S): [id number]; [id number]   ORDERING CLINICIAN: EULALIA MORGAN   FINDINGS: LEFT ANKLE: Plantar calcaneal spur. No acute fracture. The ankle mortise is symmetric without medial or lateral clear space widening. The tibiotalar and subtalar joints are intact.   LEFT FOOT: There is no acute fracture of the 5th metatarsal neck, 4th metatarsal neck, and 3rd metatarsal neck. All fractures are angulated in the lateral direction slightly. The Lisfranc joint is intact. There is soft tissue swelling over the distal forefoot.       Acute slightly angulated fractures of the 3rd-5th metatarsal necks with mild ulnar displacement.   No acute osseous abnormality of the left ankle.   MACRO: None.   Signed by: Delano Galeana 12/11/2024 11:12 PM Dictation workstation:   [id number]     Assessment/Plan   Left foot fracture of the 3rd-5th metatarsal necks and a mild ulnar displacement- conservative management advised  Resolved leukocytosis     No antibiotics at this time  Monitor for signs and symptoms of infection  Urinalysis not indicative of infection, patient is also asymptomatic  Monitor WBC and temperature  Supportive care  Work-up per cardiology and neurology   OK to discharge from ID standpoint       Total time spent caring for the patient today was 20 minutes.  This includes time spent before the visit reviewing the chart, time spent during the visit, and time spent after the visit on documentation.      Dora Watts, APRN-CNP

## 2024-12-14 NOTE — PROGRESS NOTES
12/14/24 1217   Discharge Planning   Expected Discharge Disposition Home H     Spoke with patient via phone to discuss discharge planning.  Patient is declining SNF.  Would like to return home with [facility].  PCP is Dr. Cervantes. Also called and spoke with daughter.  Daughter is in agreement with home with [facility].  Daughter sated she or her  are at patient's  home several hours during the day to help.  Daughter also does the shopping.  Daughter stated patient will need a walker for home.  Dr. Cervantes messaged that a script for a walker is needed. Will also need an internal referral for [facility].     DISCHARGE PLAN IS NOT SECURE.  PATIENT WILL NEED A WALKER PRIOR TO DISCHARGE.  WILL NEED TO CONFIRM HHC SOC.

## 2024-12-15 VITALS
HEART RATE: 82 BPM | BODY MASS INDEX: 30.65 KG/M2 | HEIGHT: 63 IN | TEMPERATURE: 97.9 F | RESPIRATION RATE: 18 BRPM | OXYGEN SATURATION: 95 % | SYSTOLIC BLOOD PRESSURE: 165 MMHG | WEIGHT: 173 LBS | DIASTOLIC BLOOD PRESSURE: 89 MMHG

## 2024-12-15 PROCEDURE — G0378 HOSPITAL OBSERVATION PER HR: HCPCS

## 2024-12-15 PROCEDURE — 99239 HOSP IP/OBS DSCHRG MGMT >30: CPT | Performed by: INTERNAL MEDICINE

## 2024-12-15 PROCEDURE — 2500000001 HC RX 250 WO HCPCS SELF ADMINISTERED DRUGS (ALT 637 FOR MEDICARE OP): Performed by: INTERNAL MEDICINE

## 2024-12-15 PROCEDURE — 2500000004 HC RX 250 GENERAL PHARMACY W/ HCPCS (ALT 636 FOR OP/ED): Performed by: INTERNAL MEDICINE

## 2024-12-15 RX ORDER — MIDODRINE HYDROCHLORIDE 2.5 MG/1
1.25 TABLET ORAL
Start: 2024-12-15

## 2024-12-15 ASSESSMENT — COGNITIVE AND FUNCTIONAL STATUS - GENERAL
MOBILITY SCORE: 23
DAILY ACTIVITIY SCORE: 24
DAILY ACTIVITIY SCORE: 24
CLIMB 3 TO 5 STEPS WITH RAILING: A LITTLE
MOBILITY SCORE: 23
CLIMB 3 TO 5 STEPS WITH RAILING: A LITTLE

## 2024-12-15 ASSESSMENT — PAIN SCALES - GENERAL
PAINLEVEL_OUTOF10: 0 - NO PAIN
PAINLEVEL_OUTOF10: 0 - NO PAIN

## 2024-12-15 NOTE — PROGRESS NOTES
"   12/15/24 1325   Discharge Planning   Expected Discharge Disposition Home H     Patient has an active discharge order.  Patient is declining SNF. Neuro has signed off.  Dr. Cedillo messaged this morning that an internal referral is needed for   HHC. Patient also needs a script for a home going walker per request from daughter.     UPDATE:  Dr Cedillo messaged again that an internal referral is needed for HHC.   Responded \"ok\".   "

## 2024-12-15 NOTE — SIGNIFICANT EVENT
EEG final read negative.  No further acute evaluation inpatient.    Patient is to follow up with me as outpatient for monitoring of any further events.     Thank you for the consult. Will sign off. Please do not hesitate to reach out with any questions or any new change in patient's neurological status.     Ronald Villagomez MD   Neurology and Vascular Neurology

## 2024-12-15 NOTE — CARE PLAN
The patient's goals for the shift include MAINTAIN SAFETY    The clinical goals for the shift include pt will remain safe and free from injury        Problem: Pain - Adult  Goal: Verbalizes/displays adequate comfort level or baseline comfort level  Outcome: Progressing     Problem: Safety - Adult  Goal: Free from fall injury  Outcome: Progressing     Problem: Discharge Planning  Goal: Discharge to home or other facility with appropriate resources  Outcome: Progressing     Problem: Chronic Conditions and Co-morbidities  Goal: Patient's chronic conditions and co-morbidity symptoms are monitored and maintained or improved  Outcome: Progressing     Problem: Fall/Injury  Goal: Not fall by end of shift  Outcome: Progressing  Goal: Be free from injury by end of the shift  Outcome: Progressing  Goal: Verbalize understanding of personal risk factors for fall in the hospital  Outcome: Progressing  Goal: Verbalize understanding of risk factor reduction measures to prevent injury from fall in the home  Outcome: Progressing  Goal: Use assistive devices by end of the shift  Outcome: Progressing  Goal: Pace activities to prevent fatigue by end of the shift  Outcome: Progressing     Problem: Pain  Goal: Takes deep breaths with improved pain control throughout the shift  Outcome: Progressing  Goal: Turns in bed with improved pain control throughout the shift  Outcome: Progressing  Goal: Walks with improved pain control throughout the shift  Outcome: Progressing  Goal: Performs ADL's with improved pain control throughout shift  Outcome: Progressing  Goal: Participates in PT with improved pain control throughout the shift  Outcome: Progressing  Goal: Free from opioid side effects throughout the shift  Outcome: Progressing  Goal: Free from acute confusion related to pain meds throughout the shift  Outcome: Progressing

## 2024-12-15 NOTE — PROGRESS NOTES
12/15/24 0748   Discharge Planning   Expected Discharge Disposition Home H   Does the patient need discharge transport arranged? No     Patient has an active discharge order. Needs clearance from neurology.  Patient is declining SNF.  Would like to discharge home with [facility]. Dr. Cervantes is PCP.  Will need an internal referral.  Patient would also like a walker for home. Will need a script so that PT can dispense walker prior to discharge.

## 2024-12-15 NOTE — DISCHARGE SUMMARY
Discharge Diagnosis  Closed fracture of left foot, initial encounter    Issues Requiring Follow-Up  Paroxysmal A-fib  Hypertension    Test Results Pending At Discharge  Pending Labs       No current pending labs.            Hospital Course   Patient presented after a fall.  Found to have fracture of third and fifth metatarsal neck with mild ulnar displacement.  Podiatry saw the patient and placed her on the boat.  No surgery planned.  Workup for syncope done and cardiology workup negative neurology workup negative.  Patient discharged home with a walker and the boot    Pertinent Physical Exam At Time of Discharge  Physical Exam  Vitals reviewed.   Constitutional:       Appearance: Normal appearance.   HENT:      Head: Normocephalic and atraumatic.      Right Ear: Tympanic membrane, ear canal and external ear normal.      Left Ear: Tympanic membrane, ear canal and external ear normal.      Nose: Nose normal.      Mouth/Throat:      Pharynx: Oropharynx is clear.   Eyes:      Extraocular Movements: Extraocular movements intact.      Conjunctiva/sclera: Conjunctivae normal.      Pupils: Pupils are equal, round, and reactive to light.   Cardiovascular:      Rate and Rhythm: Normal rate and regular rhythm.      Pulses: Normal pulses.      Heart sounds: Normal heart sounds.   Pulmonary:      Effort: Pulmonary effort is normal.      Breath sounds: Normal breath sounds.   Abdominal:      General: Abdomen is flat. Bowel sounds are normal.      Palpations: Abdomen is soft.   Musculoskeletal:      Cervical back: Normal range of motion and neck supple.   Skin:     General: Skin is warm and dry.   Neurological:      General: No focal deficit present.      Mental Status: She is alert and oriented to person, place, and time.   Psychiatric:         Mood and Affect: Mood normal.         Home Medications     Medication List      CHANGE how you take these medications     escitalopram 10 mg tablet; Commonly known as: Lexapro; Take 1  tablet (10   mg) by mouth once daily.; What changed: Another medication with the same   name was removed. Continue taking this medication, and follow the   directions you see here.     CONTINUE taking these medications     apixaban 5 mg tablet; Commonly known as: Eliquis; Take 1 tablet (5 mg)   by mouth 2 times a day.; Notes to patient: 2 TIMES DAILY   atenolol 25 mg tablet; Commonly known as: Tenormin; Take 1 tablet (25   mg) by mouth once daily.; Notes to patient: ONCE DAILY   cranberry 500 mg capsule   digoxin 125 MCG tablet; Commonly known as: Lanoxin; Take 1 tablet (125   mcg) by mouth 3 times a week.; Notes to patient: 1 TABLET 3 TIMES WEEKLY   midodrine 2.5 mg tablet; Commonly known as: Proamatine; Take 0.5 tablets   (1.25 mg) by mouth 2 times daily (morning and late afternoon).   multivitamin tablet; Notes to patient: ONCE DAILY   omeprazole 40 mg DR capsule; Commonly known as: PriLOSEC; Take 1 capsule   (40 mg) by mouth once daily in the morning. Take before meals.; Notes to   patient: ONCE DAILY       Outpatient Follow-Up  Future Appointments   Date Time Provider Department Center   2/27/2025  2:00 PM Scot Rosenberg DO [address]       Kelsy Cedillo MD

## 2024-12-15 NOTE — PROGRESS NOTES
"Mercedes Castellanos is a 84 y.o. female on day 1 of admission presenting with Closed fracture of left foot, initial encounter.    Subjective   Doing better no new complaints       Objective     Physical Exam  Vitals reviewed.   Constitutional:       Appearance: Normal appearance.   HENT:      Head: Normocephalic and atraumatic.      Right Ear: Tympanic membrane, ear canal and external ear normal.      Left Ear: Tympanic membrane, ear canal and external ear normal.      Nose: Nose normal.      Mouth/Throat:      Pharynx: Oropharynx is clear.   Eyes:      Extraocular Movements: Extraocular movements intact.      Conjunctiva/sclera: Conjunctivae normal.      Pupils: Pupils are equal, round, and reactive to light.   Cardiovascular:      Rate and Rhythm: Normal rate. Rhythm irregular.      Pulses: Normal pulses.      Heart sounds: Normal heart sounds.   Pulmonary:      Effort: Pulmonary effort is normal.      Breath sounds: Normal breath sounds.   Abdominal:      General: Abdomen is flat. Bowel sounds are normal.      Palpations: Abdomen is soft.   Musculoskeletal:      Cervical back: Normal range of motion and neck supple.      Comments: Left foot  brace   Skin:     General: Skin is warm and dry.   Neurological:      General: No focal deficit present.      Mental Status: She is alert and oriented to person, place, and time.   Psychiatric:         Mood and Affect: Mood normal.         Last Recorded Vitals  Blood pressure 153/85, pulse 73, temperature 36.8 °C (98.2 °F), temperature source Oral, resp. rate 17, height 1.6 m (5' 3\"), weight 78.5 kg (173 lb), SpO2 96%.  Intake/Output last 3 Shifts:  I/O last 3 completed shifts:  In: 360 (4.6 mL/kg) [P.O.:360]  Out: - (0 mL/kg)   Weight: 78.5 kg     Relevant Results               Scheduled medications  apixaban, 5 mg, oral, BID  atenolol, 25 mg, oral, Daily  digoxin, 125 mcg, oral, Once per day on Monday Wednesday Friday  escitalopram, 10 mg, oral, Daily  magnesium oxide, 400 mg, " oral, Daily  midodrine, 2.5 mg, oral, BID  pantoprazole, 40 mg, oral, Daily before breakfast  vitamin B complex-vitamin C-folic acid, 1 capsule, oral, Daily      Continuous medications     PRN medications  PRN medications: traMADol  No results found. However, due to the size of the patient record, not all encounters were searched. Please check Results Review for a complete set of results.  No results found.    No results found for this or any previous visit (from the past 24 hours).             Assessment/Plan   Assessment & Plan  Closed fracture of left foot, initial encounter    Fall, initial encounter    Syncope    Longstanding persistent atrial fibrillation (Multi)    Cardiology workup negative  Neurology input noted  EEG results negative  Blood pressure stable  Minimally displaced metatarsal fracture 3 3, 4, 5.  WBAT with surgical shoe and walker  Plan discharge   Prescription for walker given  Home care set up       I spent  minutes in the professional and overall care of this patient.      Kelsy Cedillo MD

## 2024-12-15 NOTE — PROGRESS NOTES
"Mercedes Castellanos is a 84 y.o. female on day 1 of admission presenting with Closed fracture of left foot, initial encounter.    Subjective   Doing better no new complaints       Objective     Physical Exam  Vitals reviewed.   Constitutional:       Appearance: Normal appearance.   HENT:      Head: Normocephalic and atraumatic.      Right Ear: Tympanic membrane, ear canal and external ear normal.      Left Ear: Tympanic membrane, ear canal and external ear normal.      Nose: Nose normal.      Mouth/Throat:      Pharynx: Oropharynx is clear.   Eyes:      Extraocular Movements: Extraocular movements intact.      Conjunctiva/sclera: Conjunctivae normal.      Pupils: Pupils are equal, round, and reactive to light.   Cardiovascular:      Rate and Rhythm: Normal rate. Rhythm irregular.      Pulses: Normal pulses.      Heart sounds: Normal heart sounds.   Pulmonary:      Effort: Pulmonary effort is normal.      Breath sounds: Normal breath sounds.   Abdominal:      General: Abdomen is flat. Bowel sounds are normal.      Palpations: Abdomen is soft.   Musculoskeletal:      Cervical back: Normal range of motion and neck supple.      Comments: Left foot  brace   Skin:     General: Skin is warm and dry.   Neurological:      General: No focal deficit present.      Mental Status: She is alert and oriented to person, place, and time.   Psychiatric:         Mood and Affect: Mood normal.         Last Recorded Vitals  Blood pressure 155/88, pulse 89, temperature 36.9 °C (98.4 °F), temperature source Oral, resp. rate 16, height 1.6 m (5' 3\"), weight 78.5 kg (173 lb), SpO2 97%.  Intake/Output last 3 Shifts:  I/O last 3 completed shifts:  In: 360 (4.6 mL/kg) [P.O.:360]  Out: - (0 mL/kg)   Weight: 78.5 kg     Relevant Results               Scheduled medications  apixaban, 5 mg, oral, BID  atenolol, 25 mg, oral, Daily  digoxin, 125 mcg, oral, Once per day on Monday Wednesday Friday  escitalopram, 10 mg, oral, Daily  magnesium oxide, 400 mg, " oral, Daily  midodrine, 2.5 mg, oral, BID  pantoprazole, 40 mg, oral, Daily before breakfast  vitamin B complex-vitamin C-folic acid, 1 capsule, oral, Daily      Continuous medications     PRN medications  PRN medications: traMADol  No results found. However, due to the size of the patient record, not all encounters were searched. Please check Results Review for a complete set of results.  Transthoracic Echo (TTE) Limited    Result Date: 12/13/2024           [place] [id number]            Phone [phone] TRANSTHORACIC ECHOCARDIOGRAM REPORT Patient Name:       DL QUINONES LEEANN     Reading Physician:    [id number] Tor Medley MD Study Date:         12/13/2024           Ordering Provider:    [id number] SILVANO LUNDBERG MRN/PID:            [id number]             Fellow: Accession#:         [id number]         Nurse: Date of Birth/Age:  1940 / 84 years Sonographer:          Wild Harrington RDCS Gender Assigned at  F                    Additional Staff: Birth: Height:             160.00 cm            Admit Date: Weight:             79.00 kg             Admission Status:     Inpatient -                                                                Routine BSA / BMI:          1.82 m2 / 30.86      Department Location:  [facility], [place], operated by [facility]                     kg/m2 Blood Pressure: 149 /86 mmHg Study Type:    TRANSTHORACIC ECHO (TTE) LIMITED Diagnosis/ICD: Syncope-R55 Indication:    Syncope CPT Codes:     Echo Limited-33403; Doppler Full-44870; Color Doppler-70594 Patient History: Pertinent History: A-Fib, Chest Pain, Dyspnea, HTN, Hyperlipidemia, Murmur, LE                    Edema and Syncope. Study Detail: The following Echo studies were performed: 2D, M-Mode, Doppler and               color flow.  Unable to obtain suprasternal notch, subcostal and               Limited Exam view.  PHYSICIAN INTERPRETATION: Left Ventricle: Left ventricular ejection fraction is normal, by visual estimate at 55-60%. There are no regional wall motion abnormalities. The left ventricular cavity size is normal. Spectral Doppler shows a Grade I (impaired relaxation pattern) of left ventricular diastolic filling with normal left atrial filling pressure. Left Atrium: The left atrium is upper limits of normal in size. Right Ventricle: The right ventricle is normal in size. There is normal right ventricular global systolic function. Right Atrium: The right atrium is upper limits of normal in size. Aortic Valve: The aortic valve is probably trileaflet. There is evidence of mildly elevated transaortic gradients consistent with sclerosis of the aortic valve. The aortic valve dimensionless index is 0.58. There is trace aortic valve regurgitation. The peak instantaneous gradient of the aortic valve is 5 mmHg. The mean gradient of the aortic valve is 3 mmHg. Mitral Valve: The mitral valve is normal in structure. The peak instantaneous gradient of the mitral valve is 4 mmHg. There is trace mitral valve regurgitation. Tricuspid Valve: The tricuspid valve is structurally normal. There is mild tricuspid regurgitation. Pulmonic Valve: The pulmonic valve is not well visualized. There is trace pulmonic valve regurgitation. Pericardium: Trivial pericardial effusion. Aorta: The aortic root was not well visualized.  CONCLUSIONS:  1. Left ventricular ejection fraction is normal, by visual estimate at 55-60%.  2. Spectral Doppler shows a Grade I (impaired relaxation pattern) of left ventricular diastolic filling with normal left atrial filling pressure.  3. There is normal right ventricular global systolic function.  4. Aortic valve sclerosis. QUANTITATIVE DATA SUMMARY:  LA VOLUME:                   Normal Ranges: LA Vol A4C:       122.9 ml LA Vol A2C:        100.9 ml LA Vol Index BSA: 61.4 ml/m2  RA VOLUME BY A/L METHOD:          Normal Ranges: RA Area A4C:             22.3 cm2  LV SYSTOLIC FUNCTION BY 2D PLANIMETRY (MOD):                      Normal Ranges: EF-A4C View:    51 % (>=55%) EF-A2C View:    44 % EF-Biplane:     51 % EF-Visual:      58 % EF-3DQ:         57 % LV EF Reported: 58 %  MITRAL VALVE:          Normal Ranges: MV Vmax:      0.96 m/s (<=1.3m/s) MV peak PG:   3.7 mmHg (<5mmHg) MV mean P.5 mmHg (<48mmHg)  AORTIC VALVE:                     Normal Ranges: AoV Vmax:                1.17 m/s (<=1.7m/s) AoV Peak P.5 mmHg (<20mmHg) AoV Mean PG:             3.0 mmHg (1.7-11.5mmHg) LVOT Max Fabián:            0.74 m/s (<=1.1m/s) AoV VTI:                 22.59 cm (18-25cm) LVOT VTI:                13.19 cm LVOT Diameter:           1.95 cm  (1.8-2.4cm) AoV Area, VTI:           1.75 cm2 (2.5-5.5cm2) AoV Area,Vmax:           1.90 cm2 (2.5-4.5cm2) AoV Dimensionless Index: 0.58  RIGHT VENTRICLE: RV Basal 3.90 cm RV Mid   2.80 cm RV Major 4.5 cm  TRICUSPID VALVE/RVSP:          Normal Ranges: Peak TR Velocity:     2.93 m/s RV Syst Pressure:     37 mmHg  (< 30mmHg)  PULMONIC VALVE:          Normal Ranges: PV Max Fabián:     1.0 m/s  (0.6-0.9m/s) PV Max P.0 mmHg PV Mean P.1 mmHg PV VTI:         21.70 cm  [id number] Tor Medley MD Electronically signed on 2024 at 11:39:53 AM  ** Final **                   Assessment/Plan   Assessment & Plan  Closed fracture of left foot, initial encounter    Fall, initial encounter    Syncope    Longstanding persistent atrial fibrillation (Multi)    Cardiology workup negative  Neurology input noted  EEG results pending  Blood pressure stable  Minimally displaced metatarsal fracture 3 3, 4, 5.  WBAT with surgical shoe and walker  Plan discharge once cleared by neurology       I spent  minutes in the professional and overall care of this patient.      Kelsy Cedillo MD

## 2024-12-16 ENCOUNTER — DOCUMENTATION (OUTPATIENT)
Dept: HOME HEALTH SERVICES | Facility: HOME HEALTH | Age: 84
End: 2024-12-16
Payer: MEDICARE

## 2024-12-16 ENCOUNTER — HOME HEALTH ADMISSION (OUTPATIENT)
Dept: HOME HEALTH SERVICES | Facility: HOME HEALTH | Age: 84
End: 2024-12-16
Payer: MEDICARE

## 2024-12-16 VITALS
WEIGHT: 173 LBS | DIASTOLIC BLOOD PRESSURE: 87 MMHG | HEIGHT: 63 IN | OXYGEN SATURATION: 93 % | HEART RATE: 80 BPM | RESPIRATION RATE: 18 BRPM | TEMPERATURE: 97.9 F | BODY MASS INDEX: 30.65 KG/M2 | SYSTOLIC BLOOD PRESSURE: 153 MMHG

## 2024-12-16 PROCEDURE — 97116 GAIT TRAINING THERAPY: CPT | Mod: GP

## 2024-12-16 PROCEDURE — 2500000004 HC RX 250 GENERAL PHARMACY W/ HCPCS (ALT 636 FOR OP/ED): Performed by: INTERNAL MEDICINE

## 2024-12-16 PROCEDURE — 2500000001 HC RX 250 WO HCPCS SELF ADMINISTERED DRUGS (ALT 637 FOR MEDICARE OP): Performed by: INTERNAL MEDICINE

## 2024-12-16 PROCEDURE — G0378 HOSPITAL OBSERVATION PER HR: HCPCS

## 2024-12-16 ASSESSMENT — COGNITIVE AND FUNCTIONAL STATUS - GENERAL
MOBILITY SCORE: 23
MOVING FROM LYING ON BACK TO SITTING ON SIDE OF FLAT BED WITH BEDRAILS: A LITTLE
CLIMB 3 TO 5 STEPS WITH RAILING: A LOT
MOBILITY SCORE: 17
MOVING TO AND FROM BED TO CHAIR: A LITTLE
WALKING IN HOSPITAL ROOM: A LITTLE
TURNING FROM BACK TO SIDE WHILE IN FLAT BAD: A LITTLE
STANDING UP FROM CHAIR USING ARMS: A LITTLE
CLIMB 3 TO 5 STEPS WITH RAILING: A LITTLE
DAILY ACTIVITIY SCORE: 24

## 2024-12-16 ASSESSMENT — PAIN SCALES - GENERAL
PAINLEVEL_OUTOF10: 2
PAINLEVEL_OUTOF10: 0 - NO PAIN

## 2024-12-16 ASSESSMENT — PAIN - FUNCTIONAL ASSESSMENT: PAIN_FUNCTIONAL_ASSESSMENT: FLACC (FACE, LEGS, ACTIVITY, CRY, CONSOLABILITY)

## 2024-12-16 NOTE — CARE PLAN
The patient's goals for the shift include MAINTAIN SAFETY    The clinical goals for the shift include safety      Problem: Pain - Adult  Goal: Verbalizes/displays adequate comfort level or baseline comfort level  Outcome: Progressing     Problem: Safety - Adult  Goal: Free from fall injury  Outcome: Progressing     Problem: Discharge Planning  Goal: Discharge to home or other facility with appropriate resources  Outcome: Progressing     Problem: Chronic Conditions and Co-morbidities  Goal: Patient's chronic conditions and co-morbidity symptoms are monitored and maintained or improved  Outcome: Progressing     Problem: Fall/Injury  Goal: Not fall by end of shift  Outcome: Progressing  Goal: Be free from injury by end of the shift  Outcome: Progressing  Goal: Verbalize understanding of personal risk factors for fall in the hospital  Outcome: Progressing  Goal: Verbalize understanding of risk factor reduction measures to prevent injury from fall in the home  Outcome: Progressing  Goal: Use assistive devices by end of the shift  Outcome: Progressing  Goal: Pace activities to prevent fatigue by end of the shift  Outcome: Progressing     Problem: Pain  Goal: Takes deep breaths with improved pain control throughout the shift  Outcome: Progressing  Goal: Turns in bed with improved pain control throughout the shift  Outcome: Progressing  Goal: Walks with improved pain control throughout the shift  Outcome: Progressing  Goal: Performs ADL's with improved pain control throughout shift  Outcome: Progressing  Goal: Participates in PT with improved pain control throughout the shift  Outcome: Progressing  Goal: Free from opioid side effects throughout the shift  Outcome: Progressing  Goal: Free from acute confusion related to pain meds throughout the shift  Outcome: Progressing
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
## 2024-12-16 NOTE — PROGRESS NOTES
12/16/24 1117   Discharge Planning   Expected Discharge Disposition Home H     Patient is planned discharge home with [facility].     INTERNAL referral orders received.   [facility] is currently processing, awaiting SOC.    UPDATE 1221: Per [facility],  processed the referral for a Start of Care on 12.17 or 12.18.2024.

## 2024-12-16 NOTE — CARE PLAN
Problem: PT Problem  Goal: Pt will transition supine<>sit with mod I.   Outcome: Progressing  Goal: Pt will transfer sit<>stand with Jr FWW, distant S & cues.  Outcome: Progressing  Goal: Pt will ambulate >/=25 ft with Jr FWW, distant S & cues.  Outcome: Progressing

## 2024-12-16 NOTE — CARE PLAN
The patient's goals for the shift include to be discharged    The clinical goals for the shift include pt will remain safe and free from injury      Problem: Pain - Adult  Goal: Verbalizes/displays adequate comfort level or baseline comfort level  Outcome: Progressing     Problem: Safety - Adult  Goal: Free from fall injury  Outcome: Progressing     Problem: Discharge Planning  Goal: Discharge to home or other facility with appropriate resources  Outcome: Progressing     Problem: Chronic Conditions and Co-morbidities  Goal: Patient's chronic conditions and co-morbidity symptoms are monitored and maintained or improved  Outcome: Progressing     Problem: Fall/Injury  Goal: Not fall by end of shift  Outcome: Progressing  Goal: Be free from injury by end of the shift  Outcome: Progressing  Goal: Verbalize understanding of personal risk factors for fall in the hospital  Outcome: Progressing  Goal: Verbalize understanding of risk factor reduction measures to prevent injury from fall in the home  Outcome: Progressing  Goal: Use assistive devices by end of the shift  Outcome: Progressing  Goal: Pace activities to prevent fatigue by end of the shift  Outcome: Progressing     Problem: Pain  Goal: Takes deep breaths with improved pain control throughout the shift  Outcome: Progressing  Goal: Turns in bed with improved pain control throughout the shift  Outcome: Progressing  Goal: Walks with improved pain control throughout the shift  Outcome: Progressing  Goal: Performs ADL's with improved pain control throughout shift  Outcome: Progressing  Goal: Participates in PT with improved pain control throughout the shift  Outcome: Progressing  Goal: Free from opioid side effects throughout the shift  Outcome: Progressing  Goal: Free from acute confusion related to pain meds throughout the shift  Outcome: Progressing

## 2024-12-16 NOTE — HH CARE COORDINATION
Home Care received a Referral for Nursing, Physical Therapy, and Occupational Therapy. We have processed the referral for a Start of Care on 12.17 or 12.18.2024.     If you have any questions or concerns, please feel free to contact us at [phone]. Follow the prompts, enter your five digit zip code, and you will be directed to your care team on [facility].

## 2024-12-19 ENCOUNTER — HOME CARE VISIT (OUTPATIENT)
Dept: HOME HEALTH SERVICES | Facility: HOME HEALTH | Age: 84
End: 2024-12-19
Payer: MEDICARE

## 2024-12-19 VITALS
HEART RATE: 80 BPM | SYSTOLIC BLOOD PRESSURE: 130 MMHG | RESPIRATION RATE: 18 BRPM | OXYGEN SATURATION: 97 % | DIASTOLIC BLOOD PRESSURE: 89 MMHG | TEMPERATURE: 97.7 F

## 2024-12-19 PROCEDURE — G0299 HHS/HOSPICE OF RN EA 15 MIN: HCPCS

## 2024-12-19 ASSESSMENT — ENCOUNTER SYMPTOMS
HIGHEST PAIN SEVERITY IN PAST 24 HOURS: 7/10
PAIN: 1
PAIN LOCATION: LEFT FOOT
APPETITE LEVEL: GOOD
LOWER EXTREMITY EDEMA: 1

## 2024-12-19 ASSESSMENT — ACTIVITIES OF DAILY LIVING (ADL)
CURRENT_FUNCTION: STAND BY ASSIST
AMBULATION ASSISTANCE: STAND BY ASSIST
OASIS_M1830: 03
ENTERING_EXITING_HOME: STAND BY ASSIST

## 2024-12-23 ENCOUNTER — HOME CARE VISIT (OUTPATIENT)
Dept: HOME HEALTH SERVICES | Facility: HOME HEALTH | Age: 84
End: 2024-12-23
Payer: MEDICARE

## 2024-12-23 VITALS — OXYGEN SATURATION: 97 % | HEART RATE: 83 BPM | TEMPERATURE: 96.6 F

## 2024-12-23 VITALS — OXYGEN SATURATION: 97 % | HEART RATE: 83 BPM | SYSTOLIC BLOOD PRESSURE: 148 MMHG | DIASTOLIC BLOOD PRESSURE: 94 MMHG

## 2024-12-23 PROCEDURE — G0152 HHCP-SERV OF OT,EA 15 MIN: HCPCS

## 2024-12-23 PROCEDURE — G0151 HHCP-SERV OF PT,EA 15 MIN: HCPCS | Performed by: PHYSICAL THERAPIST

## 2024-12-23 ASSESSMENT — ACTIVITIES OF DAILY LIVING (ADL)
PREPARING MEALS: INDEPENDENT
LIGHT HOUSEKEEPING: NEEDS ASSISTANCE
HOUSEKEEPING ASSESSED: 1
DRESSING_UB_CURRENT_FUNCTION: INDEPENDENT
BATHING_CURRENT_FUNCTION: SUPERVISION
BATHING ASSESSED: 1
DRESSING_LB_CURRENT_FUNCTION: INDEPENDENT
LAUNDRY ASSESSED: 1
TOILETING: INDEPENDENT
TOILETING: 1
LAUNDRY: NEEDS ASSISTANCE

## 2024-12-23 ASSESSMENT — ENCOUNTER SYMPTOMS
LOWEST PAIN SEVERITY IN PAST 24 HOURS: 2/10
PAIN LOCATION - RELIEVING FACTORS: REST, MEDS
PAIN LOCATION - PAIN FREQUENCY: WITH ACTIVITY
PAIN LOCATION - PAIN SEVERITY: 5/10
PAIN LOCATION: LEFT FOOT
PAIN LOCATION - EXACERBATING FACTORS: STANDING
PERSON REPORTING PAIN: PATIENT
PAIN: 1
MUSCLE WEAKNESS: 1
PAIN LOCATION: LEFT FOOT
PERSON REPORTING PAIN: PATIENT
PAIN LOCATION - PAIN SEVERITY: 5/10
PAIN SEVERITY GOAL: 0/10
HIGHEST PAIN SEVERITY IN PAST 24 HOURS: 10/10
PAIN: 1
PAIN LOCATION - PAIN QUALITY: ACHING
HIGHEST PAIN SEVERITY IN PAST 24 HOURS: 10/10

## 2024-12-24 ENCOUNTER — HOME CARE VISIT (OUTPATIENT)
Dept: HOME HEALTH SERVICES | Facility: HOME HEALTH | Age: 84
End: 2024-12-24
Payer: MEDICARE

## 2024-12-24 VITALS
DIASTOLIC BLOOD PRESSURE: 70 MMHG | HEART RATE: 73 BPM | OXYGEN SATURATION: 98 % | RESPIRATION RATE: 18 BRPM | TEMPERATURE: 97.3 F | SYSTOLIC BLOOD PRESSURE: 130 MMHG

## 2024-12-24 PROCEDURE — G0299 HHS/HOSPICE OF RN EA 15 MIN: HCPCS

## 2024-12-24 SDOH — ECONOMIC STABILITY: GENERAL

## 2024-12-24 ASSESSMENT — ENCOUNTER SYMPTOMS
PAIN: 1
HIGHEST PAIN SEVERITY IN PAST 24 HOURS: 7/10
PAIN LOCATION: LEFT FOOT
LOWER EXTREMITY EDEMA: 1
APPETITE LEVEL: GOOD

## 2024-12-24 ASSESSMENT — ACTIVITIES OF DAILY LIVING (ADL)
AMBULATION ASSISTANCE: STAND BY ASSIST
CURRENT_FUNCTION: STAND BY ASSIST

## 2024-12-30 ENCOUNTER — HOME CARE VISIT (OUTPATIENT)
Dept: HOME HEALTH SERVICES | Facility: HOME HEALTH | Age: 84
End: 2024-12-30
Payer: MEDICARE

## 2024-12-30 VITALS
OXYGEN SATURATION: 97 % | TEMPERATURE: 96.4 F | SYSTOLIC BLOOD PRESSURE: 132 MMHG | DIASTOLIC BLOOD PRESSURE: 85 MMHG | HEART RATE: 67 BPM

## 2024-12-30 PROCEDURE — G0151 HHCP-SERV OF PT,EA 15 MIN: HCPCS | Performed by: PHYSICAL THERAPIST

## 2024-12-30 ASSESSMENT — ENCOUNTER SYMPTOMS
MUSCLE WEAKNESS: 1
PAIN LOCATION: LEFT FOOT
LOWEST PAIN SEVERITY IN PAST 24 HOURS: 0/10
PERSON REPORTING PAIN: PATIENT
PAIN: 1

## 2025-01-02 ENCOUNTER — HOME CARE VISIT (OUTPATIENT)
Dept: HOME HEALTH SERVICES | Facility: HOME HEALTH | Age: 85
End: 2025-01-02
Payer: MEDICARE

## 2025-01-02 VITALS
OXYGEN SATURATION: 96 % | DIASTOLIC BLOOD PRESSURE: 84 MMHG | HEART RATE: 80 BPM | RESPIRATION RATE: 18 BRPM | TEMPERATURE: 97.7 F | SYSTOLIC BLOOD PRESSURE: 130 MMHG

## 2025-01-02 PROCEDURE — G0299 HHS/HOSPICE OF RN EA 15 MIN: HCPCS

## 2025-01-02 SDOH — ECONOMIC STABILITY: GENERAL

## 2025-01-02 ASSESSMENT — ENCOUNTER SYMPTOMS
HIGHEST PAIN SEVERITY IN PAST 24 HOURS: 4/10
APPETITE LEVEL: GOOD
PAIN: 1
LOWER EXTREMITY EDEMA: 1

## 2025-01-02 ASSESSMENT — ACTIVITIES OF DAILY LIVING (ADL)
AMBULATION ASSISTANCE: STAND BY ASSIST
MONEY MANAGEMENT (EXPENSES/BILLS): NEEDS ASSISTANCE
CURRENT_FUNCTION: STAND BY ASSIST

## 2025-01-06 ENCOUNTER — HOME CARE VISIT (OUTPATIENT)
Dept: HOME HEALTH SERVICES | Facility: HOME HEALTH | Age: 85
End: 2025-01-06
Payer: MEDICARE

## 2025-01-06 VITALS
TEMPERATURE: 97.3 F | DIASTOLIC BLOOD PRESSURE: 95 MMHG | OXYGEN SATURATION: 96 % | SYSTOLIC BLOOD PRESSURE: 170 MMHG | HEART RATE: 85 BPM

## 2025-01-06 PROCEDURE — G0151 HHCP-SERV OF PT,EA 15 MIN: HCPCS | Performed by: PHYSICAL THERAPIST

## 2025-01-06 ASSESSMENT — ENCOUNTER SYMPTOMS
PAIN LOCATION - PAIN SEVERITY: 5/10
MUSCLE WEAKNESS: 1
PAIN LOCATION - EXACERBATING FACTORS: PUTTING WEIGHT ON IT
PAIN: 1
PAIN LOCATION: LEFT FOOT
PERSON REPORTING PAIN: PATIENT

## 2025-01-09 ENCOUNTER — HOME CARE VISIT (OUTPATIENT)
Dept: HOME HEALTH SERVICES | Facility: HOME HEALTH | Age: 85
End: 2025-01-09
Payer: MEDICARE

## 2025-01-09 VITALS
OXYGEN SATURATION: 95 % | SYSTOLIC BLOOD PRESSURE: 130 MMHG | HEART RATE: 70 BPM | RESPIRATION RATE: 18 BRPM | TEMPERATURE: 98.7 F | DIASTOLIC BLOOD PRESSURE: 70 MMHG

## 2025-01-09 PROCEDURE — G0299 HHS/HOSPICE OF RN EA 15 MIN: HCPCS

## 2025-01-09 SDOH — ECONOMIC STABILITY: GENERAL

## 2025-01-09 ASSESSMENT — ACTIVITIES OF DAILY LIVING (ADL)
CURRENT_FUNCTION: STAND BY ASSIST
MONEY MANAGEMENT (EXPENSES/BILLS): NEEDS ASSISTANCE
AMBULATION ASSISTANCE: STAND BY ASSIST

## 2025-01-09 ASSESSMENT — ENCOUNTER SYMPTOMS
LOWER EXTREMITY EDEMA: 1
APPETITE LEVEL: GOOD
DENIES PAIN: 1

## 2025-01-13 ENCOUNTER — HOME CARE VISIT (OUTPATIENT)
Dept: HOME HEALTH SERVICES | Facility: HOME HEALTH | Age: 85
End: 2025-01-13
Payer: MEDICARE

## 2025-01-13 VITALS
HEART RATE: 76 BPM | SYSTOLIC BLOOD PRESSURE: 132 MMHG | TEMPERATURE: 96 F | DIASTOLIC BLOOD PRESSURE: 82 MMHG | OXYGEN SATURATION: 96 %

## 2025-01-13 PROCEDURE — G0151 HHCP-SERV OF PT,EA 15 MIN: HCPCS | Performed by: PHYSICAL THERAPIST

## 2025-01-13 ASSESSMENT — ENCOUNTER SYMPTOMS
PAIN LOCATION: LEFT FOOT
PAIN: NO PAIN AT REST
PERSON REPORTING PAIN: PATIENT
PAIN: 1

## 2025-01-13 ASSESSMENT — ACTIVITIES OF DAILY LIVING (ADL): AMBULATION ASSISTANCE ON FLAT SURFACES: 1

## 2025-01-15 ENCOUNTER — HOME CARE VISIT (OUTPATIENT)
Dept: HOME HEALTH SERVICES | Facility: HOME HEALTH | Age: 85
End: 2025-01-15
Payer: MEDICARE

## 2025-01-15 VITALS
HEART RATE: 73 BPM | OXYGEN SATURATION: 97 % | SYSTOLIC BLOOD PRESSURE: 140 MMHG | TEMPERATURE: 97.5 F | RESPIRATION RATE: 18 BRPM | DIASTOLIC BLOOD PRESSURE: 89 MMHG

## 2025-01-15 DIAGNOSIS — I10 BENIGN ESSENTIAL HTN: ICD-10-CM

## 2025-01-15 DIAGNOSIS — I48.91 ATRIAL FIBRILLATION, UNSPECIFIED TYPE (MULTI): ICD-10-CM

## 2025-01-15 DIAGNOSIS — I95.9 HYPOTENSION, UNSPECIFIED HYPOTENSION TYPE: ICD-10-CM

## 2025-01-15 DIAGNOSIS — I48.0 PAF (PAROXYSMAL ATRIAL FIBRILLATION) (MULTI): ICD-10-CM

## 2025-01-15 PROCEDURE — G0299 HHS/HOSPICE OF RN EA 15 MIN: HCPCS

## 2025-01-15 RX ORDER — DIGOXIN 125 MCG
125 TABLET ORAL 3 TIMES WEEKLY
Qty: 30 TABLET | Refills: 3 | Status: SHIPPED | OUTPATIENT
Start: 2025-01-15

## 2025-01-15 RX ORDER — ATENOLOL 25 MG/1
25 TABLET ORAL DAILY
Qty: 90 TABLET | Refills: 3 | Status: SHIPPED | OUTPATIENT
Start: 2025-01-15 | End: 2026-01-15

## 2025-01-15 RX ORDER — MIDODRINE HYDROCHLORIDE 2.5 MG/1
1.25 TABLET ORAL
Qty: 90 TABLET | Refills: 3 | Status: SHIPPED | OUTPATIENT
Start: 2025-01-15

## 2025-01-15 SDOH — ECONOMIC STABILITY: GENERAL

## 2025-01-15 ASSESSMENT — ENCOUNTER SYMPTOMS
DENIES PAIN: 1
APPETITE LEVEL: GOOD
LOWER EXTREMITY EDEMA: 1

## 2025-01-15 ASSESSMENT — ACTIVITIES OF DAILY LIVING (ADL)
AMBULATION ASSISTANCE: STAND BY ASSIST
CURRENT_FUNCTION: STAND BY ASSIST
MONEY MANAGEMENT (EXPENSES/BILLS): NEEDS ASSISTANCE

## 2025-01-21 ENCOUNTER — HOME CARE VISIT (OUTPATIENT)
Dept: HOME HEALTH SERVICES | Facility: HOME HEALTH | Age: 85
End: 2025-01-21
Payer: MEDICARE

## 2025-01-21 VITALS
TEMPERATURE: 97.7 F | SYSTOLIC BLOOD PRESSURE: 130 MMHG | RESPIRATION RATE: 18 BRPM | OXYGEN SATURATION: 98 % | DIASTOLIC BLOOD PRESSURE: 89 MMHG | HEART RATE: 76 BPM

## 2025-01-21 PROCEDURE — G0299 HHS/HOSPICE OF RN EA 15 MIN: HCPCS

## 2025-01-21 SDOH — ECONOMIC STABILITY: GENERAL

## 2025-01-21 ASSESSMENT — ENCOUNTER SYMPTOMS
PAIN: 1
APPETITE LEVEL: GOOD
PAIN LOCATION: LEFT FOOT

## 2025-01-28 ENCOUNTER — HOME CARE VISIT (OUTPATIENT)
Dept: HOME HEALTH SERVICES | Facility: HOME HEALTH | Age: 85
End: 2025-01-28
Payer: MEDICARE

## 2025-01-28 VITALS
HEART RATE: 80 BPM | TEMPERATURE: 97.7 F | RESPIRATION RATE: 18 BRPM | DIASTOLIC BLOOD PRESSURE: 80 MMHG | OXYGEN SATURATION: 99 % | SYSTOLIC BLOOD PRESSURE: 124 MMHG

## 2025-01-28 PROCEDURE — G0299 HHS/HOSPICE OF RN EA 15 MIN: HCPCS

## 2025-01-28 ASSESSMENT — ACTIVITIES OF DAILY LIVING (ADL)
OASIS_M1830: 00
HOME_HEALTH_OASIS: 01

## 2025-01-28 ASSESSMENT — ENCOUNTER SYMPTOMS: DENIES PAIN: 1

## 2025-02-07 ENCOUNTER — TELEPHONE (OUTPATIENT)
Dept: CARDIOLOGY | Facility: CLINIC | Age: 85
End: 2025-02-07

## 2025-02-07 NOTE — TELEPHONE ENCOUNTER
Juliana pt daughter called the office requesting a new script for Atenolol 25mg tab   In the system we have the pt taking  Atenolol 25mg tab once a day  Pt daughter stated pt should be taking Atenolol 25mg twice a day Billy Pink made these changes at pt last office visit     Please advice   Good call back number  [phone]

## 2025-02-21 DIAGNOSIS — I48.0 PAF (PAROXYSMAL ATRIAL FIBRILLATION) (MULTI): Primary | ICD-10-CM

## 2025-02-21 RX ORDER — ATENOLOL 25 MG/1
25 TABLET ORAL
Qty: 60 TABLET | Refills: 1 | Status: SHIPPED | OUTPATIENT
Start: 2025-02-21 | End: 2025-04-22

## 2025-02-21 RX ORDER — ATENOLOL 25 MG/1
25 TABLET ORAL DAILY
Qty: 30 TABLET | Refills: 1 | Status: SHIPPED | OUTPATIENT
Start: 2025-02-21 | End: 2025-02-21 | Stop reason: WASHOUT

## 2025-02-26 DIAGNOSIS — I48.0 PAF (PAROXYSMAL ATRIAL FIBRILLATION) (MULTI): ICD-10-CM

## 2025-02-26 DIAGNOSIS — I95.9 HYPOTENSION, UNSPECIFIED HYPOTENSION TYPE: ICD-10-CM

## 2025-02-26 DIAGNOSIS — I48.91 ATRIAL FIBRILLATION, UNSPECIFIED TYPE (MULTI): ICD-10-CM

## 2025-02-26 DIAGNOSIS — I10 BENIGN ESSENTIAL HTN: ICD-10-CM

## 2025-02-26 RX ORDER — ATENOLOL 25 MG/1
TABLET ORAL
Qty: 180 TABLET | Refills: 10 | Status: SHIPPED | OUTPATIENT
Start: 2025-02-26

## 2025-02-26 RX ORDER — DIGOXIN 125 MCG
TABLET ORAL
Qty: 12 TABLET | Refills: 10 | Status: SHIPPED | OUTPATIENT
Start: 2025-02-26

## 2025-02-26 RX ORDER — APIXABAN 5 MG/1
TABLET, FILM COATED ORAL
Qty: 180 TABLET | Refills: 10 | Status: SHIPPED | OUTPATIENT
Start: 2025-02-26

## 2025-02-26 RX ORDER — MIDODRINE HYDROCHLORIDE 2.5 MG/1
TABLET ORAL
Qty: 90 TABLET | Refills: 10 | Status: SHIPPED | OUTPATIENT
Start: 2025-02-26

## 2025-02-27 ENCOUNTER — OFFICE VISIT (OUTPATIENT)
Dept: CARDIOLOGY | Facility: CLINIC | Age: 85
End: 2025-02-27
Payer: MEDICARE

## 2025-02-27 VITALS
SYSTOLIC BLOOD PRESSURE: 148 MMHG | DIASTOLIC BLOOD PRESSURE: 70 MMHG | HEART RATE: 85 BPM | RESPIRATION RATE: 14 BRPM | HEIGHT: 63 IN | WEIGHT: 174 LBS | OXYGEN SATURATION: 98 % | BODY MASS INDEX: 30.83 KG/M2

## 2025-02-27 DIAGNOSIS — E78.5 HYPERLIPIDEMIA: Primary | ICD-10-CM

## 2025-02-27 PROCEDURE — 1126F AMNT PAIN NOTED NONE PRSNT: CPT | Performed by: INTERNAL MEDICINE

## 2025-02-27 PROCEDURE — 3077F SYST BP >= 140 MM HG: CPT | Performed by: INTERNAL MEDICINE

## 2025-02-27 PROCEDURE — 3078F DIAST BP <80 MM HG: CPT | Performed by: INTERNAL MEDICINE

## 2025-02-27 PROCEDURE — 1036F TOBACCO NON-USER: CPT | Performed by: INTERNAL MEDICINE

## 2025-02-27 PROCEDURE — 99213 OFFICE O/P EST LOW 20 MIN: CPT | Performed by: INTERNAL MEDICINE

## 2025-02-27 PROCEDURE — 1159F MED LIST DOCD IN RCRD: CPT | Performed by: INTERNAL MEDICINE

## 2025-02-27 ASSESSMENT — PATIENT HEALTH QUESTIONNAIRE - PHQ9
1. LITTLE INTEREST OR PLEASURE IN DOING THINGS: NOT AT ALL
2. FEELING DOWN, DEPRESSED OR HOPELESS: NOT AT ALL
SUM OF ALL RESPONSES TO PHQ9 QUESTIONS 1 AND 2: 0

## 2025-02-27 ASSESSMENT — LIFESTYLE VARIABLES
AUDIT-C TOTAL SCORE: 0
HOW OFTEN DO YOU HAVE A DRINK CONTAINING ALCOHOL: NEVER
HOW OFTEN DO YOU HAVE SIX OR MORE DRINKS ON ONE OCCASION: NEVER
HAS A RELATIVE, FRIEND, DOCTOR, OR ANOTHER HEALTH PROFESSIONAL EXPRESSED CONCERN ABOUT YOUR DRINKING OR SUGGESTED YOU CUT DOWN: NO
HOW MANY STANDARD DRINKS CONTAINING ALCOHOL DO YOU HAVE ON A TYPICAL DAY: PATIENT DOES NOT DRINK
HAVE YOU OR SOMEONE ELSE BEEN INJURED AS A RESULT OF YOUR DRINKING: NO
AUDIT TOTAL SCORE: 0
SKIP TO QUESTIONS 9-10: 1

## 2025-02-27 ASSESSMENT — ENCOUNTER SYMPTOMS
OCCASIONAL FEELINGS OF UNSTEADINESS: 0
DEPRESSION: 0
LOSS OF SENSATION IN FEET: 0

## 2025-02-27 ASSESSMENT — PAIN SCALES - GENERAL: PAINLEVEL_OUTOF10: 0-NO PAIN

## 2025-02-27 NOTE — ASSESSMENT & PLAN NOTE
Continue midodrine to maintain stable blood pressure and allow treatment of her atrial fibrillation without hypotension from beta-blocker therapy.

## 2025-02-27 NOTE — PROGRESS NOTES
Subjective      No chief complaint on file.       Here to reassess atrial fibrillation therapy using a rate control strategy with anticoagulation, using low-dose atenolol she was having orthostatic hypotension and dizziness so  started low-dose midodrine therapy which was successful at maintaining good blood pressure management     Previous: Diagnosed with in-hospital  episode of A-fib with RVR.  Patient developed orthostatic blood pressure.  Medications were adjusted, final recommendation from cardiology was midodrine, atenolol, and digoxin with close outpatient follow-up.          Review of Systems   All other systems reviewed and are negative.       Objective   Physical Exam  Constitutional:       Appearance: Normal appearance.   HENT:      Head: Normocephalic and atraumatic.   Eyes:      Pupils: Pupils are equal, round, and reactive to light.   Cardiovascular:      Rate and Rhythm: Normal rate and regular rhythm.      Pulses: Normal pulses.      Heart sounds: Normal heart sounds.   Pulmonary:      Effort: Pulmonary effort is normal.      Breath sounds: Normal breath sounds.   Abdominal:      General: Abdomen is flat. Bowel sounds are normal.      Palpations: Abdomen is soft.   Musculoskeletal:         General: Normal range of motion.      Cervical back: Normal range of motion.   Skin:     General: Skin is warm and dry.   Neurological:      General: No focal deficit present.   Psychiatric:         Mood and Affect: Mood normal.         Judgment: Judgment normal.          Lab Review:   Not applicable    Orthostatic hypotension  Continue midodrine to maintain stable blood pressure and allow treatment of her atrial fibrillation without hypotension from beta-blocker therapy.    Longstanding persistent atrial fibrillation (Multi)  Chronic anticoagulation with Eliquis for cardioembolic risk reduction.  She has stable renal function and is not underweight so we will use full dose Eliquis.    Check metabolic profile in 6  months prior to her follow-up visit confirm stable renal and hepatic function.

## 2025-02-27 NOTE — ASSESSMENT & PLAN NOTE
Chronic anticoagulation with Eliquis for cardioembolic risk reduction.  She has stable renal function and is not underweight so we will use full dose Eliquis.    Check metabolic profile in 6 months prior to her follow-up visit confirm stable renal and hepatic function.

## 2025-04-20 ENCOUNTER — APPOINTMENT (OUTPATIENT)
Dept: CARDIOLOGY | Facility: HOSPITAL | Age: 85
End: 2025-04-20
Payer: MEDICARE

## 2025-04-20 ENCOUNTER — APPOINTMENT (OUTPATIENT)
Dept: RADIOLOGY | Facility: HOSPITAL | Age: 85
End: 2025-04-20
Payer: MEDICARE

## 2025-04-20 ENCOUNTER — HOSPITAL ENCOUNTER (INPATIENT)
Facility: HOSPITAL | Age: 85
LOS: 2 days | Discharge: HOME | End: 2025-04-24
Attending: EMERGENCY MEDICINE | Admitting: INTERNAL MEDICINE
Payer: MEDICARE

## 2025-04-20 DIAGNOSIS — N39.0 URINARY TRACT INFECTION WITHOUT HEMATURIA, SITE UNSPECIFIED: ICD-10-CM

## 2025-04-20 DIAGNOSIS — R42 LIGHTHEADEDNESS: ICD-10-CM

## 2025-04-20 DIAGNOSIS — I48.91 ATRIAL FIBRILLATION WITH RVR (MULTI): Primary | ICD-10-CM

## 2025-04-20 DIAGNOSIS — N17.9 AKI (ACUTE KIDNEY INJURY): ICD-10-CM

## 2025-04-20 LAB
ALBUMIN SERPL BCP-MCNC: 3.9 G/DL (ref 3.4–5)
ALP SERPL-CCNC: 67 U/L (ref 33–136)
ALT SERPL W P-5'-P-CCNC: 14 U/L (ref 7–45)
ANION GAP SERPL CALCULATED.3IONS-SCNC: 13 MMOL/L (ref 10–20)
APPEARANCE UR: CLEAR
AST SERPL W P-5'-P-CCNC: 21 U/L (ref 9–39)
BASOPHILS # BLD AUTO: 0.04 X10*3/UL (ref 0–0.1)
BASOPHILS NFR BLD AUTO: 0.4 %
BILIRUB SERPL-MCNC: 0.5 MG/DL (ref 0–1.2)
BILIRUB UR STRIP.AUTO-MCNC: NEGATIVE MG/DL
BNP SERPL-MCNC: 293 PG/ML (ref 0–99)
BUN SERPL-MCNC: 16 MG/DL (ref 6–23)
CALCIUM SERPL-MCNC: 9.7 MG/DL (ref 8.6–10.3)
CARDIAC TROPONIN I PNL SERPL HS: 8 NG/L (ref 0–13)
CARDIAC TROPONIN I PNL SERPL HS: 9 NG/L (ref 0–13)
CHLORIDE SERPL-SCNC: 108 MMOL/L (ref 98–107)
CO2 SERPL-SCNC: 25 MMOL/L (ref 21–32)
COLOR UR: YELLOW
CREAT SERPL-MCNC: 1.15 MG/DL (ref 0.5–1.05)
EGFRCR SERPLBLD CKD-EPI 2021: 47 ML/MIN/1.73M*2
EOSINOPHIL # BLD AUTO: 0.11 X10*3/UL (ref 0–0.4)
EOSINOPHIL NFR BLD AUTO: 1 %
ERYTHROCYTE [DISTWIDTH] IN BLOOD BY AUTOMATED COUNT: 14.2 % (ref 11.5–14.5)
GLUCOSE SERPL-MCNC: 215 MG/DL (ref 74–99)
GLUCOSE UR STRIP.AUTO-MCNC: NORMAL MG/DL
HCT VFR BLD AUTO: 45.8 % (ref 36–46)
HGB BLD-MCNC: 15.2 G/DL (ref 12–16)
HYALINE CASTS #/AREA URNS AUTO: ABNORMAL /LPF
IMM GRANULOCYTES # BLD AUTO: 0.03 X10*3/UL (ref 0–0.5)
IMM GRANULOCYTES NFR BLD AUTO: 0.3 % (ref 0–0.9)
KETONES UR STRIP.AUTO-MCNC: ABNORMAL MG/DL
LEUKOCYTE ESTERASE UR QL STRIP.AUTO: ABNORMAL
LIPASE SERPL-CCNC: 37 U/L (ref 9–82)
LYMPHOCYTES # BLD AUTO: 1.86 X10*3/UL (ref 0.8–3)
LYMPHOCYTES NFR BLD AUTO: 17.4 %
MAGNESIUM SERPL-MCNC: 2 MG/DL (ref 1.6–2.4)
MCH RBC QN AUTO: 30.2 PG (ref 26–34)
MCHC RBC AUTO-ENTMCNC: 33.2 G/DL (ref 32–36)
MCV RBC AUTO: 91 FL (ref 80–100)
MONOCYTES # BLD AUTO: 0.58 X10*3/UL (ref 0.05–0.8)
MONOCYTES NFR BLD AUTO: 5.4 %
MUCOUS THREADS #/AREA URNS AUTO: ABNORMAL /LPF
NEUTROPHILS # BLD AUTO: 8.07 X10*3/UL (ref 1.6–5.5)
NEUTROPHILS NFR BLD AUTO: 75.5 %
NITRITE UR QL STRIP.AUTO: NEGATIVE
NRBC BLD-RTO: 0 /100 WBCS (ref 0–0)
PH UR STRIP.AUTO: 5 [PH]
PLATELET # BLD AUTO: 240 X10*3/UL (ref 150–450)
POTASSIUM SERPL-SCNC: 4 MMOL/L (ref 3.5–5.3)
PROT SERPL-MCNC: 7.6 G/DL (ref 6.4–8.2)
PROT UR STRIP.AUTO-MCNC: ABNORMAL MG/DL
RBC # BLD AUTO: 5.03 X10*6/UL (ref 4–5.2)
RBC # UR STRIP.AUTO: NEGATIVE MG/DL
RBC #/AREA URNS AUTO: ABNORMAL /HPF
SODIUM SERPL-SCNC: 142 MMOL/L (ref 136–145)
SP GR UR STRIP.AUTO: 1.03
SQUAMOUS #/AREA URNS AUTO: ABNORMAL /HPF
UROBILINOGEN UR STRIP.AUTO-MCNC: NORMAL MG/DL
WBC # BLD AUTO: 10.7 X10*3/UL (ref 4.4–11.3)
WBC #/AREA URNS AUTO: >50 /HPF

## 2025-04-20 PROCEDURE — 87086 URINE CULTURE/COLONY COUNT: CPT | Mod: WESLAB

## 2025-04-20 PROCEDURE — 2500000001 HC RX 250 WO HCPCS SELF ADMINISTERED DRUGS (ALT 637 FOR MEDICARE OP): Performed by: EMERGENCY MEDICINE

## 2025-04-20 PROCEDURE — 71045 X-RAY EXAM CHEST 1 VIEW: CPT

## 2025-04-20 PROCEDURE — 83880 ASSAY OF NATRIURETIC PEPTIDE: CPT

## 2025-04-20 PROCEDURE — 85025 COMPLETE CBC W/AUTO DIFF WBC: CPT

## 2025-04-20 PROCEDURE — 83735 ASSAY OF MAGNESIUM: CPT

## 2025-04-20 PROCEDURE — 96375 TX/PRO/DX INJ NEW DRUG ADDON: CPT

## 2025-04-20 PROCEDURE — 70450 CT HEAD/BRAIN W/O DYE: CPT | Performed by: STUDENT IN AN ORGANIZED HEALTH CARE EDUCATION/TRAINING PROGRAM

## 2025-04-20 PROCEDURE — 81001 URINALYSIS AUTO W/SCOPE: CPT

## 2025-04-20 PROCEDURE — 2500000004 HC RX 250 GENERAL PHARMACY W/ HCPCS (ALT 636 FOR OP/ED)

## 2025-04-20 PROCEDURE — 93010 ELECTROCARDIOGRAM REPORT: CPT | Performed by: INTERNAL MEDICINE

## 2025-04-20 PROCEDURE — 2500000002 HC RX 250 W HCPCS SELF ADMINISTERED DRUGS (ALT 637 FOR MEDICARE OP, ALT 636 FOR OP/ED)

## 2025-04-20 PROCEDURE — 71045 X-RAY EXAM CHEST 1 VIEW: CPT | Performed by: STUDENT IN AN ORGANIZED HEALTH CARE EDUCATION/TRAINING PROGRAM

## 2025-04-20 PROCEDURE — 70450 CT HEAD/BRAIN W/O DYE: CPT

## 2025-04-20 PROCEDURE — 84075 ASSAY ALKALINE PHOSPHATASE: CPT

## 2025-04-20 PROCEDURE — 36415 COLL VENOUS BLD VENIPUNCTURE: CPT

## 2025-04-20 PROCEDURE — G0378 HOSPITAL OBSERVATION PER HR: HCPCS

## 2025-04-20 PROCEDURE — 2500000002 HC RX 250 W HCPCS SELF ADMINISTERED DRUGS (ALT 637 FOR MEDICARE OP, ALT 636 FOR OP/ED): Performed by: EMERGENCY MEDICINE

## 2025-04-20 PROCEDURE — 96365 THER/PROPH/DIAG IV INF INIT: CPT

## 2025-04-20 PROCEDURE — 83690 ASSAY OF LIPASE: CPT

## 2025-04-20 PROCEDURE — 84484 ASSAY OF TROPONIN QUANT: CPT

## 2025-04-20 PROCEDURE — 96361 HYDRATE IV INFUSION ADD-ON: CPT

## 2025-04-20 PROCEDURE — 99285 EMERGENCY DEPT VISIT HI MDM: CPT | Mod: 25 | Performed by: EMERGENCY MEDICINE

## 2025-04-20 PROCEDURE — 93005 ELECTROCARDIOGRAM TRACING: CPT

## 2025-04-20 RX ORDER — MECLIZINE HYDROCHLORIDE 25 MG/1
25 TABLET ORAL EVERY 6 HOURS PRN
Status: DISCONTINUED | OUTPATIENT
Start: 2025-04-20 | End: 2025-04-24 | Stop reason: HOSPADM

## 2025-04-20 RX ORDER — CEFTRIAXONE 1 G/50ML
1 INJECTION, SOLUTION INTRAVENOUS ONCE
Status: COMPLETED | OUTPATIENT
Start: 2025-04-20 | End: 2025-04-20

## 2025-04-20 RX ORDER — ALUMINUM HYDROXIDE, MAGNESIUM HYDROXIDE, AND SIMETHICONE 1200; 120; 1200 MG/30ML; MG/30ML; MG/30ML
10 SUSPENSION ORAL ONCE
Status: COMPLETED | OUTPATIENT
Start: 2025-04-20 | End: 2025-04-20

## 2025-04-20 RX ORDER — ACETAMINOPHEN 325 MG/1
650 TABLET ORAL EVERY 6 HOURS PRN
Status: DISCONTINUED | OUTPATIENT
Start: 2025-04-20 | End: 2025-04-24 | Stop reason: HOSPADM

## 2025-04-20 RX ORDER — MECLIZINE HYDROCHLORIDE 25 MG/1
25 TABLET ORAL ONCE
Status: COMPLETED | OUTPATIENT
Start: 2025-04-20 | End: 2025-04-20

## 2025-04-20 RX ORDER — ONDANSETRON HYDROCHLORIDE 2 MG/ML
4 INJECTION, SOLUTION INTRAVENOUS ONCE
Status: COMPLETED | OUTPATIENT
Start: 2025-04-20 | End: 2025-04-20

## 2025-04-20 RX ORDER — ONDANSETRON HYDROCHLORIDE 2 MG/ML
4 INJECTION, SOLUTION INTRAVENOUS EVERY 6 HOURS PRN
Status: DISCONTINUED | OUTPATIENT
Start: 2025-04-20 | End: 2025-04-24 | Stop reason: HOSPADM

## 2025-04-20 RX ADMIN — MECLIZINE HYDROCHLORIDE 25 MG: 25 TABLET ORAL at 22:51

## 2025-04-20 RX ADMIN — MECLIZINE HYDROCHLORIDE 25 MG: 25 TABLET ORAL at 20:48

## 2025-04-20 RX ADMIN — SODIUM CHLORIDE 500 ML: 900 INJECTION, SOLUTION INTRAVENOUS at 20:49

## 2025-04-20 RX ADMIN — CEFTRIAXONE SODIUM 1 G: 1 INJECTION, SOLUTION INTRAVENOUS at 22:51

## 2025-04-20 RX ADMIN — ALUMINUM HYDROXIDE, MAGNESIUM HYDROXIDE, AND DIMETHICONE 10 ML: 200; 20; 200 SUSPENSION ORAL at 22:53

## 2025-04-20 RX ADMIN — ONDANSETRON 4 MG: 2 INJECTION, SOLUTION INTRAMUSCULAR; INTRAVENOUS at 20:51

## 2025-04-20 ASSESSMENT — LIFESTYLE VARIABLES
HAVE PEOPLE ANNOYED YOU BY CRITICIZING YOUR DRINKING: NO
TOTAL SCORE: 0
EVER FELT BAD OR GUILTY ABOUT YOUR DRINKING: NO
HAVE YOU EVER FELT YOU SHOULD CUT DOWN ON YOUR DRINKING: NO
EVER HAD A DRINK FIRST THING IN THE MORNING TO STEADY YOUR NERVES TO GET RID OF A HANGOVER: NO

## 2025-04-20 ASSESSMENT — COLUMBIA-SUICIDE SEVERITY RATING SCALE - C-SSRS
2. HAVE YOU ACTUALLY HAD ANY THOUGHTS OF KILLING YOURSELF?: NO
1. IN THE PAST MONTH, HAVE YOU WISHED YOU WERE DEAD OR WISHED YOU COULD GO TO SLEEP AND NOT WAKE UP?: NO
6. HAVE YOU EVER DONE ANYTHING, STARTED TO DO ANYTHING, OR PREPARED TO DO ANYTHING TO END YOUR LIFE?: NO

## 2025-04-20 ASSESSMENT — PAIN SCALES - GENERAL: PAINLEVEL_OUTOF10: 0 - NO PAIN

## 2025-04-20 ASSESSMENT — PAIN - FUNCTIONAL ASSESSMENT: PAIN_FUNCTIONAL_ASSESSMENT: 0-10

## 2025-04-21 ENCOUNTER — APPOINTMENT (OUTPATIENT)
Dept: CARDIOLOGY | Facility: HOSPITAL | Age: 85
End: 2025-04-21
Payer: MEDICARE

## 2025-04-21 LAB
ALBUMIN SERPL BCP-MCNC: 3.3 G/DL (ref 3.4–5)
ALP SERPL-CCNC: 60 U/L (ref 33–136)
ALT SERPL W P-5'-P-CCNC: 11 U/L (ref 7–45)
ANION GAP SERPL CALCULATED.3IONS-SCNC: 12 MMOL/L (ref 10–20)
AST SERPL W P-5'-P-CCNC: 15 U/L (ref 9–39)
BASOPHILS # BLD AUTO: 0.05 X10*3/UL (ref 0–0.1)
BASOPHILS NFR BLD AUTO: 0.5 %
BILIRUB SERPL-MCNC: 0.3 MG/DL (ref 0–1.2)
BUN SERPL-MCNC: 15 MG/DL (ref 6–23)
CALCIUM SERPL-MCNC: 8.7 MG/DL (ref 8.6–10.3)
CARDIAC TROPONIN I PNL SERPL HS: 9 NG/L (ref 0–13)
CHLORIDE SERPL-SCNC: 108 MMOL/L (ref 98–107)
CO2 SERPL-SCNC: 27 MMOL/L (ref 21–32)
CREAT SERPL-MCNC: 0.87 MG/DL (ref 0.5–1.05)
DIGOXIN SERPL-MCNC: 0.42 NG/ML (ref 0.8–?)
EGFRCR SERPLBLD CKD-EPI 2021: 65 ML/MIN/1.73M*2
EOSINOPHIL # BLD AUTO: 0.17 X10*3/UL (ref 0–0.4)
EOSINOPHIL NFR BLD AUTO: 1.7 %
ERYTHROCYTE [DISTWIDTH] IN BLOOD BY AUTOMATED COUNT: 14.4 % (ref 11.5–14.5)
GLUCOSE SERPL-MCNC: 105 MG/DL (ref 74–99)
HCT VFR BLD AUTO: 41 % (ref 36–46)
HGB BLD-MCNC: 13.6 G/DL (ref 12–16)
HOLD SPECIMEN: NORMAL
IMM GRANULOCYTES # BLD AUTO: 0.02 X10*3/UL (ref 0–0.5)
IMM GRANULOCYTES NFR BLD AUTO: 0.2 % (ref 0–0.9)
LYMPHOCYTES # BLD AUTO: 2.08 X10*3/UL (ref 0.8–3)
LYMPHOCYTES NFR BLD AUTO: 21.3 %
MCH RBC QN AUTO: 30.4 PG (ref 26–34)
MCHC RBC AUTO-ENTMCNC: 33.2 G/DL (ref 32–36)
MCV RBC AUTO: 92 FL (ref 80–100)
MONOCYTES # BLD AUTO: 1.12 X10*3/UL (ref 0.05–0.8)
MONOCYTES NFR BLD AUTO: 11.5 %
NEUTROPHILS # BLD AUTO: 6.32 X10*3/UL (ref 1.6–5.5)
NEUTROPHILS NFR BLD AUTO: 64.8 %
NRBC BLD-RTO: 0 /100 WBCS (ref 0–0)
PLATELET # BLD AUTO: 212 X10*3/UL (ref 150–450)
POTASSIUM SERPL-SCNC: 4 MMOL/L (ref 3.5–5.3)
PROT SERPL-MCNC: 6.3 G/DL (ref 6.4–8.2)
Q ONSET: 216 MS
QRS COUNT: 14 BEATS
QRS DURATION: 88 MS
QT INTERVAL: 358 MS
QTC CALCULATION(BAZETT): 437 MS
QTC FREDERICIA: 409 MS
R AXIS: 44 DEGREES
RBC # BLD AUTO: 4.48 X10*6/UL (ref 4–5.2)
SODIUM SERPL-SCNC: 143 MMOL/L (ref 136–145)
T AXIS: 43 DEGREES
T OFFSET: 395 MS
VENTRICULAR RATE: 90 BPM
WBC # BLD AUTO: 9.8 X10*3/UL (ref 4.4–11.3)

## 2025-04-21 PROCEDURE — 84075 ASSAY ALKALINE PHOSPHATASE: CPT | Performed by: INTERNAL MEDICINE

## 2025-04-21 PROCEDURE — 85025 COMPLETE CBC W/AUTO DIFF WBC: CPT | Performed by: INTERNAL MEDICINE

## 2025-04-21 PROCEDURE — 84484 ASSAY OF TROPONIN QUANT: CPT | Performed by: NURSE PRACTITIONER

## 2025-04-21 PROCEDURE — 36415 COLL VENOUS BLD VENIPUNCTURE: CPT | Performed by: INTERNAL MEDICINE

## 2025-04-21 PROCEDURE — 99223 1ST HOSP IP/OBS HIGH 75: CPT | Performed by: PHYSICIAN ASSISTANT

## 2025-04-21 PROCEDURE — G0378 HOSPITAL OBSERVATION PER HR: HCPCS

## 2025-04-21 PROCEDURE — 2500000002 HC RX 250 W HCPCS SELF ADMINISTERED DRUGS (ALT 637 FOR MEDICARE OP, ALT 636 FOR OP/ED): Performed by: INTERNAL MEDICINE

## 2025-04-21 PROCEDURE — 80162 ASSAY OF DIGOXIN TOTAL: CPT | Performed by: INTERNAL MEDICINE

## 2025-04-21 PROCEDURE — 2500000004 HC RX 250 GENERAL PHARMACY W/ HCPCS (ALT 636 FOR OP/ED): Mod: JZ | Performed by: INTERNAL MEDICINE

## 2025-04-21 PROCEDURE — 2500000001 HC RX 250 WO HCPCS SELF ADMINISTERED DRUGS (ALT 637 FOR MEDICARE OP): Performed by: INTERNAL MEDICINE

## 2025-04-21 PROCEDURE — 93005 ELECTROCARDIOGRAM TRACING: CPT

## 2025-04-21 RX ORDER — DIGOXIN 125 MCG
125 TABLET ORAL EVERY OTHER DAY
Status: DISCONTINUED | OUTPATIENT
Start: 2025-04-22 | End: 2025-04-22

## 2025-04-21 RX ORDER — ALUMINUM HYDROXIDE, MAGNESIUM HYDROXIDE, AND SIMETHICONE 1200; 120; 1200 MG/30ML; MG/30ML; MG/30ML
20 SUSPENSION ORAL 4 TIMES DAILY PRN
Status: DISCONTINUED | OUTPATIENT
Start: 2025-04-21 | End: 2025-04-24 | Stop reason: HOSPADM

## 2025-04-21 RX ORDER — MIDODRINE HYDROCHLORIDE 2.5 MG/1
2.5 TABLET ORAL
Status: DISCONTINUED | OUTPATIENT
Start: 2025-04-21 | End: 2025-04-22

## 2025-04-21 RX ORDER — CEFTRIAXONE 2 G/50ML
2 INJECTION, SOLUTION INTRAVENOUS EVERY 24 HOURS
Status: DISCONTINUED | OUTPATIENT
Start: 2025-04-21 | End: 2025-04-23

## 2025-04-21 RX ORDER — PANTOPRAZOLE SODIUM 20 MG/1
20 TABLET, DELAYED RELEASE ORAL
Status: DISCONTINUED | OUTPATIENT
Start: 2025-04-21 | End: 2025-04-21

## 2025-04-21 RX ORDER — ATENOLOL 25 MG/1
12.5 TABLET ORAL
Status: DISCONTINUED | OUTPATIENT
Start: 2025-04-21 | End: 2025-04-24 | Stop reason: HOSPADM

## 2025-04-21 RX ORDER — PANTOPRAZOLE SODIUM 40 MG/10ML
40 INJECTION, POWDER, LYOPHILIZED, FOR SOLUTION INTRAVENOUS DAILY
Status: DISCONTINUED | OUTPATIENT
Start: 2025-04-22 | End: 2025-04-23

## 2025-04-21 RX ADMIN — APIXABAN 5 MG: 5 TABLET, FILM COATED ORAL at 09:55

## 2025-04-21 RX ADMIN — ATENOLOL 12.5 MG: 25 TABLET ORAL at 17:33

## 2025-04-21 RX ADMIN — PANTOPRAZOLE SODIUM 20 MG: 20 TABLET, DELAYED RELEASE ORAL at 06:30

## 2025-04-21 RX ADMIN — APIXABAN 5 MG: 5 TABLET, FILM COATED ORAL at 22:00

## 2025-04-21 RX ADMIN — CEFTRIAXONE SODIUM 2 G: 2 INJECTION, SOLUTION INTRAVENOUS at 12:14

## 2025-04-21 RX ADMIN — ATENOLOL 12.5 MG: 25 TABLET ORAL at 09:55

## 2025-04-21 SDOH — SOCIAL STABILITY: SOCIAL INSECURITY: ARE YOU OR HAVE YOU BEEN THREATENED OR ABUSED PHYSICALLY, EMOTIONALLY, OR SEXUALLY BY ANYONE?: NO

## 2025-04-21 SDOH — ECONOMIC STABILITY: FOOD INSECURITY: WITHIN THE PAST 12 MONTHS, THE FOOD YOU BOUGHT JUST DIDN'T LAST AND YOU DIDN'T HAVE MONEY TO GET MORE.: NEVER TRUE

## 2025-04-21 SDOH — SOCIAL STABILITY: SOCIAL INSECURITY: HAVE YOU HAD THOUGHTS OF HARMING ANYONE ELSE?: NO

## 2025-04-21 SDOH — SOCIAL STABILITY: SOCIAL INSECURITY: WERE YOU ABLE TO COMPLETE ALL THE BEHAVIORAL HEALTH SCREENINGS?: YES

## 2025-04-21 SDOH — SOCIAL STABILITY: SOCIAL INSECURITY: WITHIN THE LAST YEAR, HAVE YOU BEEN AFRAID OF YOUR PARTNER OR EX-PARTNER?: NO

## 2025-04-21 SDOH — SOCIAL STABILITY: SOCIAL INSECURITY: WITHIN THE LAST YEAR, HAVE YOU BEEN HUMILIATED OR EMOTIONALLY ABUSED IN OTHER WAYS BY YOUR PARTNER OR EX-PARTNER?: NO

## 2025-04-21 SDOH — SOCIAL STABILITY: SOCIAL INSECURITY: DO YOU FEEL ANYONE HAS EXPLOITED OR TAKEN ADVANTAGE OF YOU FINANCIALLY OR OF YOUR PERSONAL PROPERTY?: NO

## 2025-04-21 SDOH — ECONOMIC STABILITY: FOOD INSECURITY: WITHIN THE PAST 12 MONTHS, YOU WORRIED THAT YOUR FOOD WOULD RUN OUT BEFORE YOU GOT THE MONEY TO BUY MORE.: NEVER TRUE

## 2025-04-21 SDOH — SOCIAL STABILITY: SOCIAL INSECURITY: DO YOU FEEL UNSAFE GOING BACK TO THE PLACE WHERE YOU ARE LIVING?: NO

## 2025-04-21 SDOH — SOCIAL STABILITY: SOCIAL INSECURITY: ABUSE: ADULT

## 2025-04-21 SDOH — SOCIAL STABILITY: SOCIAL INSECURITY: ARE THERE ANY APPARENT SIGNS OF INJURIES/BEHAVIORS THAT COULD BE RELATED TO ABUSE/NEGLECT?: NO

## 2025-04-21 SDOH — ECONOMIC STABILITY: INCOME INSECURITY: IN THE PAST 12 MONTHS HAS THE ELECTRIC, GAS, OIL, OR WATER COMPANY THREATENED TO SHUT OFF SERVICES IN YOUR HOME?: NO

## 2025-04-21 SDOH — SOCIAL STABILITY: SOCIAL INSECURITY: HAS ANYONE EVER THREATENED TO HURT YOUR FAMILY OR YOUR PETS?: NO

## 2025-04-21 SDOH — SOCIAL STABILITY: SOCIAL INSECURITY: DOES ANYONE TRY TO KEEP YOU FROM HAVING/CONTACTING OTHER FRIENDS OR DOING THINGS OUTSIDE YOUR HOME?: NO

## 2025-04-21 SDOH — SOCIAL STABILITY: SOCIAL INSECURITY: HAVE YOU HAD ANY THOUGHTS OF HARMING ANYONE ELSE?: NO

## 2025-04-21 ASSESSMENT — ACTIVITIES OF DAILY LIVING (ADL)
DRESSING YOURSELF: INDEPENDENT
LACK_OF_TRANSPORTATION: NO
FEEDING YOURSELF: INDEPENDENT
LACK_OF_TRANSPORTATION: NO
ADEQUATE_TO_COMPLETE_ADL: YES
BATHING: INDEPENDENT
GROOMING: INDEPENDENT
JUDGMENT_ADEQUATE_SAFELY_COMPLETE_DAILY_ACTIVITIES: YES
TOILETING: INDEPENDENT
PATIENT'S MEMORY ADEQUATE TO SAFELY COMPLETE DAILY ACTIVITIES?: YES
HEARING - RIGHT EAR: FUNCTIONAL
HEARING - LEFT EAR: FUNCTIONAL
WALKS IN HOME: INDEPENDENT

## 2025-04-21 ASSESSMENT — ENCOUNTER SYMPTOMS
RESPIRATORY NEGATIVE: 1
PSYCHIATRIC NEGATIVE: 1
ACTIVITY CHANGE: 1
WEAKNESS: 1
EYES NEGATIVE: 1
CONSTIPATION: 0
NAUSEA: 0
CHILLS: 0
ALLERGIC/IMMUNOLOGIC NEGATIVE: 1
COUGH: 0
HEMATOLOGIC/LYMPHATIC NEGATIVE: 1
DIARRHEA: 0
VOMITING: 0
FEVER: 0
FATIGUE: 1
ENDOCRINE NEGATIVE: 1
ABDOMINAL PAIN: 0
GASTROINTESTINAL NEGATIVE: 1
SHORTNESS OF BREATH: 0
LIGHT-HEADEDNESS: 1

## 2025-04-21 ASSESSMENT — COGNITIVE AND FUNCTIONAL STATUS - GENERAL
STANDING UP FROM CHAIR USING ARMS: A LITTLE
DAILY ACTIVITIY SCORE: 21
TOILETING: A LITTLE
TOILETING: A LITTLE
WALKING IN HOSPITAL ROOM: A LITTLE
MOBILITY SCORE: 19
HELP NEEDED FOR BATHING: A LITTLE
CLIMB 3 TO 5 STEPS WITH RAILING: A LOT
STANDING UP FROM CHAIR USING ARMS: A LITTLE
MOBILITY SCORE: 19
DRESSING REGULAR LOWER BODY CLOTHING: A LITTLE
DAILY ACTIVITIY SCORE: 21
CLIMB 3 TO 5 STEPS WITH RAILING: A LOT
PATIENT BASELINE BEDBOUND: NO
HELP NEEDED FOR BATHING: A LITTLE
DRESSING REGULAR LOWER BODY CLOTHING: A LITTLE
WALKING IN HOSPITAL ROOM: A LITTLE
MOVING TO AND FROM BED TO CHAIR: A LITTLE
MOVING TO AND FROM BED TO CHAIR: A LITTLE

## 2025-04-21 ASSESSMENT — LIFESTYLE VARIABLES
HOW MANY STANDARD DRINKS CONTAINING ALCOHOL DO YOU HAVE ON A TYPICAL DAY: PATIENT DOES NOT DRINK
HOW OFTEN DO YOU HAVE A DRINK CONTAINING ALCOHOL: NEVER
HOW OFTEN DO YOU HAVE 6 OR MORE DRINKS ON ONE OCCASION: NEVER
AUDIT-C TOTAL SCORE: 0
SKIP TO QUESTIONS 9-10: 1
AUDIT-C TOTAL SCORE: 0

## 2025-04-21 ASSESSMENT — PAIN SCALES - GENERAL
PAINLEVEL_OUTOF10: 0 - NO PAIN

## 2025-04-21 ASSESSMENT — PATIENT HEALTH QUESTIONNAIRE - PHQ9
2. FEELING DOWN, DEPRESSED OR HOPELESS: NOT AT ALL
SUM OF ALL RESPONSES TO PHQ9 QUESTIONS 1 & 2: 0
1. LITTLE INTEREST OR PLEASURE IN DOING THINGS: NOT AT ALL

## 2025-04-21 ASSESSMENT — PAIN - FUNCTIONAL ASSESSMENT: PAIN_FUNCTIONAL_ASSESSMENT: 0-10

## 2025-04-21 NOTE — CARE PLAN
Problem: Pain - Adult  Goal: Verbalizes/displays adequate comfort level or baseline comfort level  Outcome: Progressing     Problem: Safety - Adult  Goal: Free from fall injury  Outcome: Progressing     Problem: Discharge Planning  Goal: Discharge to home or other facility with appropriate resources  Outcome: Progressing     Problem: Chronic Conditions and Co-morbidities  Goal: Patient's chronic conditions and co-morbidity symptoms are monitored and maintained or improved  Outcome: Progressing     Problem: Nutrition  Goal: Nutrient intake appropriate for maintaining nutritional needs  Outcome: Progressing     Problem: Fall/Injury  Goal: Not fall by end of shift  Outcome: Progressing  Goal: Be free from injury by end of the shift  Outcome: Progressing  Goal: Verbalize understanding of personal risk factors for fall in the hospital  Outcome: Progressing   The patient's goals for the shift include      The clinical goals for the shift include pt will remain safe and free from injury

## 2025-04-21 NOTE — PROGRESS NOTES
Spiritual Care Visit  Spiritual Care Request    Reason for Visit:  Routine Visit: Introduction     Request Received From:       Focus of Care:  Visited With: Patient not available         Refer to :          Spiritual Care Assessment    Spiritual Assessment:                      Care Provided:       Sense of Community and or Restorationist Affiliation:  Islam   Values/Beliefs  Spiritual Requests During Hospitalization: Mercedes was  having Patient Jennifer when I tried see her today.     Addressed Needs/Concerns and/or Popeye Through:          Outcome:        Advance Directives:         Spiritual Care Annotation    Annotation:  Mercedes was having Patient Care when I tried to see her today.  Thomas Puentes

## 2025-04-21 NOTE — H&P
History Of Present Illness  Mercedes Castellanos is a 85 y.o. female presenting with CP, NEAR SYNCOPE UTI.     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), Disorder of the skin and subcutaneous tissue, unspecified (01/12/2017), Dorsalgia, unspecified (12/09/2013), Dyskinesia of esophagus (09/16/2019), Elevated blood-pressure reading, without diagnosis of hypertension (04/08/2019), Encounter for screening for lipoid disorders (04/18/2019), Essential (primary) hypertension (09/10/2018), Irritable bowel syndrome with diarrhea (11/07/2019), Lumbago with sciatica, left side (04/24/2019), Mastodynia (09/26/2019), Menopausal and female climacteric states (08/19/2019), Other conditions influencing health status (08/30/2018), Other conditions influencing health status (09/09/2013), Other conditions influencing health status, Other conditions influencing health status, Other dysphagia (11/07/2019), Other dysphagia (12/10/2019), Other specified soft tissue disorders (06/26/2019), Pain in right hip (11/13/2019), Pain in right shoulder (06/26/2019), Pain in thoracic spine (08/26/2020), Personal history of other diseases of the circulatory system, Personal history of other diseases of the digestive system (10/03/2019), Personal history of other diseases of the musculoskeletal system and connective tissue (12/15/2020), Personal history of other drug therapy (10/12/2016), Personal history of other drug therapy (02/09/2021), Personal history of other endocrine, nutritional and metabolic disease (09/10/2018), Personal history of other medical treatment (07/20/2017), Personal history of other mental and behavioral disorders (08/19/2019), Personal history of other specified conditions (09/10/2018), Personal history of other specified  conditions (05/01/2019), Personal history of other specified conditions (10/19/2017), Personal history of other specified conditions (07/07/2020), Personal history of other specified conditions (08/30/2018), Personal history of other specified conditions (09/23/2018), Personal history of urinary (tract) infections (09/10/2018), Primary osteoarthritis, unspecified hand (07/07/2020), Sacrococcygeal disorders, not elsewhere classified (12/15/2020), Trigger finger, unspecified finger (10/12/2016), Unspecified atrial fibrillation (Multi) (10/08/2018), Unsteadiness on feet (03/11/2020), and Vitreous degeneration, bilateral (08/26/2019).    Surgical History  She has a past surgical history that includes Esophagogastroduodenoscopy (03/11/2014); Gastric fundoplication (05/27/2014); Other surgical history (05/26/2022); Other surgical history (05/26/2022); and Other surgical history (05/26/2022).     Social History  She reports that she has never smoked. She has never been exposed to tobacco smoke. She has never used smokeless tobacco. She reports that she does not currently use alcohol. She reports that she does not use drugs.    Family History  Family History[1]     Allergies  Nitrofurantoin and Hydrocodone-acetaminophen    Review of Systems   Constitutional:  Positive for activity change and fatigue.   HENT: Negative.     Eyes: Negative.    Respiratory: Negative.     Cardiovascular:  Positive for chest pain.   Gastrointestinal: Negative.    Endocrine: Negative.    Genitourinary: Negative.    Musculoskeletal:         WEAK   Skin: Negative.    Allergic/Immunologic: Negative.    Neurological:  Positive for weakness and light-headedness.   Hematological: Negative.    Psychiatric/Behavioral: Negative.          Physical Exam  Constitutional:       Appearance: Normal appearance.   HENT:      Head: Normocephalic and atraumatic.      Mouth/Throat:      Mouth: Mucous membranes are dry.   Eyes:      Extraocular Movements: Extraocular  movements intact.      Conjunctiva/sclera: Conjunctivae normal.      Pupils: Pupils are equal, round, and reactive to light.   Cardiovascular:      Rate and Rhythm: Normal rate. Rhythm irregular.      Pulses: Normal pulses.   Pulmonary:      Effort: Pulmonary effort is normal.      Breath sounds: Normal breath sounds.   Abdominal:      General: Bowel sounds are normal.      Palpations: Abdomen is soft.   Musculoskeletal:      Cervical back: Normal range of motion.      Comments: WEAK   Skin:     General: Skin is warm.   Neurological:      General: No focal deficit present.      Mental Status: She is alert and oriented to person, place, and time.   Psychiatric:         Mood and Affect: Mood normal.          Last Recorded Vitals  /82 (BP Location: Left arm, Patient Position: Lying)   Pulse 70   Temp 36.7 °C (98.1 °F) (Oral)   Resp 17   Wt 78.9 kg (174 lb)   SpO2 100%     Relevant Results             Assessment/Plan   Assessment & Plan  UTI (urinary tract infection)      WEAK, HX CP YESTERDAY, NEAR SYNCOPE AT DAUGHTER S HOUSE AFTER DRIVING SELF THERE       Ruth Cervantes MD         [1]   Family History  Problem Relation Name Age of Onset    Diabetes Father      Coronary artery disease Sister      Coronary artery disease Brother      Glaucoma Brother      Coronary artery disease Other Family Hx     Ovarian cancer Sibling
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
## 2025-04-21 NOTE — CONSULTS
Inpatient consult to Infectious Diseases  Consult performed by: Speedy Coleman MD  Consult ordered by: Ruth Cervantes MD            Primary MD: Ruth Cervantes MD    Reason For Consult  Urinary tract infection    History Of Present Illness  Mercedes Castellanos is a 85 y.o. female presenting with weakness and dizziness.  This occurred on day of admission.  She described her dizziness as lightheadedness.  She did not have any loss of consciousness.  She came to the hospital for further care.  Her workup was remarkable for pyuria.  CT of the brain was negative for any acute process.  She got a dose of ceftriaxone.  She had positive urine culture for Kluyvera ascorbata, Klebsiella pneumoniae, E. coli per review of her urine cultures since 8/30/2018.  She denies any fever or chills.  She denies any dysuria.  She denies any abdominal pain, nausea or vomiting.     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), Disorder of the skin and subcutaneous tissue, unspecified (01/12/2017), Dorsalgia, unspecified (12/09/2013), Dyskinesia of esophagus (09/16/2019), Elevated blood-pressure reading, without diagnosis of hypertension (04/08/2019), Encounter for screening for lipoid disorders (04/18/2019), Essential (primary) hypertension (09/10/2018), Irritable bowel syndrome with diarrhea (11/07/2019), Lumbago with sciatica, left side (04/24/2019), Mastodynia (09/26/2019), Menopausal and female climacteric states (08/19/2019), Other conditions influencing health status (08/30/2018), Other conditions influencing health status (09/09/2013), Other conditions influencing health status, Other conditions influencing health status, Other dysphagia (11/07/2019), Other dysphagia (12/10/2019), Other specified soft tissue disorders  (06/26/2019), Pain in right hip (11/13/2019), Pain in right shoulder (06/26/2019), Pain in thoracic spine (08/26/2020), Personal history of other diseases of the circulatory system, Personal history of other diseases of the digestive system (10/03/2019), Personal history of other diseases of the musculoskeletal system and connective tissue (12/15/2020), Personal history of other drug therapy (10/12/2016), Personal history of other drug therapy (02/09/2021), Personal history of other endocrine, nutritional and metabolic disease (09/10/2018), Personal history of other medical treatment (07/20/2017), Personal history of other mental and behavioral disorders (08/19/2019), Personal history of other specified conditions (09/10/2018), Personal history of other specified conditions (05/01/2019), Personal history of other specified conditions (10/19/2017), Personal history of other specified conditions (07/07/2020), Personal history of other specified conditions (08/30/2018), Personal history of other specified conditions (09/23/2018), Personal history of urinary (tract) infections (09/10/2018), Primary osteoarthritis, unspecified hand (07/07/2020), Sacrococcygeal disorders, not elsewhere classified (12/15/2020), Trigger finger, unspecified finger (10/12/2016), Unspecified atrial fibrillation (Multi) (10/08/2018), Unsteadiness on feet (03/11/2020), and Vitreous degeneration, bilateral (08/26/2019).    Surgical History  She has a past surgical history that includes Esophagogastroduodenoscopy (03/11/2014); Gastric fundoplication (05/27/2014); Other surgical history (05/26/2022); Other surgical history (05/26/2022); and Other surgical history (05/26/2022).     Social History     Occupational History    Not on file   Tobacco Use    Smoking status: Never     Passive exposure: Never    Smokeless tobacco: Never   Vaping Use    Vaping status: Never Used   Substance and Sexual Activity    Alcohol use: Not Currently    Drug use: Never     Sexual activity: Defer     Travel History   Travel since 03/21/25    No documented travel since 03/21/25         Family History  Family History[1]  Allergies  Nitrofurantoin and Hydrocodone-acetaminophen     Immunization History   Administered Date(s) Administered    Flu vaccine, quadrivalent, high-dose, preservative free, age 65y+ (FLUZONE) 10/31/2022    Flu vaccine, trivalent, preservative free, HIGH-DOSE, age 65y+ (Fluzone) 10/20/2017, 09/22/2020    Influenza, Seasonal, Quadrivalent, Adjuvanted 08/13/2021    Influenza, seasonal, injectable 09/30/2015, 08/13/2021    Influenza, trivalent, adjuvanted 08/20/2019    Moderna SARS-CoV-2 Vaccination 01/29/2021, 02/26/2021, 11/30/2021    Novel influenza-H1N1-09, preservative-free 01/27/2010    Pneumococcal conjugate vaccine, 13-valent (PREVNAR 13) 04/13/2016, 04/30/2019    Pneumococcal polysaccharide vaccine, 23-valent, age 2 years and older (PNEUMOVAX 23) 01/03/2007, 04/24/2020    Tdap vaccine, age 7 year and older (BOOSTRIX, ADACEL) 04/30/2019    Zoster vaccine, recombinant, adult (SHINGRIX) 08/20/2019, 10/31/2019    Zoster, Unspecified 08/20/2019, 10/31/2019    Zoster, live 09/12/2014     Medications  Home medications:  Prescriptions Prior to Admission[2]  Current medications:  Scheduled medications  Scheduled Medications[3]  Continuous medications  Continuous Medications[4]  PRN medications  PRN Medications[5]    Review of Systems   Constitutional:  Positive for fatigue. Negative for chills and fever.   Respiratory:  Negative for cough and shortness of breath.    Cardiovascular:  Negative for chest pain and leg swelling.   Gastrointestinal:  Negative for abdominal pain, constipation, diarrhea, nausea and vomiting.   Neurological:  Positive for light-headedness.   All other systems reviewed and are negative.       Objective  Range of Vitals (last 24 hours)  Heart Rate:  [70-96]   Temp:  [36.6 °C (97.9 °F)-37.4 °C (99.3 °F)]   Resp:  [10-31]   BP: (112-171)/()  "  Height:  [160 cm (5' 3\")]   Weight:  [78.9 kg (174 lb)]   SpO2:  [95 %-100 %]   Daily Weight  04/20/25 : 78.9 kg (174 lb)    Body mass index is 30.82 kg/m².     Physical Exam  Constitutional:       Appearance: Normal appearance.   HENT:      Head: Normocephalic and atraumatic.      Nose: Nose normal.   Eyes:      General: No scleral icterus.     Extraocular Movements: Extraocular movements intact.      Conjunctiva/sclera: Conjunctivae normal.   Cardiovascular:      Rate and Rhythm: Normal rate and regular rhythm.      Heart sounds: Normal heart sounds.   Pulmonary:      Effort: Pulmonary effort is normal.      Breath sounds: Normal breath sounds.   Abdominal:      General: Bowel sounds are normal.      Palpations: Abdomen is soft.   Musculoskeletal:      Cervical back: Normal range of motion and neck supple.      Right lower leg: No edema.      Left lower leg: No edema.   Skin:     General: Skin is warm and dry.   Neurological:      Mental Status: She is alert.   Psychiatric:         Behavior: Behavior normal.          Relevant Results  Outside Hospital Results    Labs  Results from last 72 hours   Lab Units 04/21/25  1116 04/20/25 2028   WBC AUTO x10*3/uL 9.8 10.7   HEMOGLOBIN g/dL 13.6 15.2   HEMATOCRIT % 41.0 45.8   PLATELETS AUTO x10*3/uL 212 240   NEUTROS PCT AUTO % 64.8 75.5   LYMPHS PCT AUTO % 21.3 17.4   MONOS PCT AUTO % 11.5 5.4   EOS PCT AUTO % 1.7 1.0     Results from last 72 hours   Lab Units 04/21/25  0616 04/20/25 2028   SODIUM mmol/L 143 142   POTASSIUM mmol/L 4.0 4.0   CHLORIDE mmol/L 108* 108*   CO2 mmol/L 27 25   BUN mg/dL 15 16   CREATININE mg/dL 0.87 1.15*   GLUCOSE mg/dL 105* 215*   CALCIUM mg/dL 8.7 9.7   ANION GAP mmol/L 12 13   EGFR mL/min/1.73m*2 65 47*     Results from last 72 hours   Lab Units 04/21/25  0616 04/20/25 2028   ALK PHOS U/L 60 67   BILIRUBIN TOTAL mg/dL 0.3 0.5   PROTEIN TOTAL g/dL 6.3* 7.6   ALT U/L 11 14   AST U/L 15 21   ALBUMIN g/dL 3.3* 3.9     Estimated Creatinine " "Clearance: 47 mL/min (by C-G formula based on SCr of 0.87 mg/dL).  C-Reactive Protein   Date Value Ref Range Status   09/24/2024 4.96 (H) <1.00 mg/dL Final     No results found for: \"HIV1X2\", \"HIVCONF\", \"GCOTEN8KQ\"  No results found for: \"HEPCABINIT\", \"HEPCAB\", \"HCVPCRQUANT\"  Microbiology  Reviewed-urine culture pending  Imaging  ECG 12 lead  Result Date: 4/21/2025  Atrial fibrillation Nonspecific ST abnormality Abnormal ECG When compared with ECG of 23-SEP-2024 23:59, Nonspecific T wave abnormality now evident in Inferior leads Confirmed by Nirmal Alonso ([id number]) on 4/21/2025 2:30:08 PM    CT head wo IV contrast  Result Date: 4/20/2025  Interpreted By:  Delano Galeana, STUDY: CT HEAD WO IV CONTRAST;  4/20/2025 9:10 pm   INDICATION: Signs/Symptoms:Dizziness, difficulty ambulating.     COMPARISON: 12/12/2024   ACCESSION NUMBER(S): [id number]   ORDERING CLINICIAN: RAVI FRAZIER   TECHNIQUE: Noncontrast axial CT images of head were obtained with coronal and sagittal reconstructed images.   FINDINGS: BRAIN PARENCHYMA: Moderate periventricular and subcortical hemispheric white matter hypodensities are most compatible with chronic small vessel ischemic disease. No acute intraparenchymal hemorrhage or parenchymal evidence of acute large territory ischemic infarct. Gray-white matter distinction is preserved. No mass-effect.   VENTRICLES and EXTRA-AXIAL SPACES:  No acute extra-axial or intraventricular hemorrhage. No effacement of cerebral sulci. The ventricles and sulci are age-concordant.   PARANASAL SINUSES/MASTOIDS:  No hemorrhage or air-fluid levels within the visualized paranasal sinuses. The mastoids are well aerated.   CALVARIUM/ORBITS:  No acute skull fracture.  The orbits and globes are intact to the extent visualized.   EXTRACRANIAL SOFT TISSUES: No discernible acute abnormality.       No acute intracranial abnormality.   Stable moderate burden of supratentorial chronic small vessel ischemic disease.   MACRO: " None.   Signed by: Delano Galeana 4/20/2025 9:17 PM Dictation workstation:   [id number]    XR chest 1 view  Result Date: 4/20/2025  Interpreted By:  Delano Galeana, STUDY: XR CHEST 1 VIEW;  4/20/2025 8:43 pm   INDICATION: Signs/Symptoms:Dizziness.     COMPARISON: 09/24/2024   ACCESSION NUMBER(S): [id number]   ORDERING CLINICIAN: RAVI FRAZIER   FINDINGS:     Cardiomegaly. The pulmonary vasculature is within normal limits. Small hiatal hernia again noted. Atherosclerotic aorta. No consolidation, pleural effusion or pneumothorax.   Suture anchors in the left greater tuberosity noted.       Cardiomegaly without evidence of acute disease in the chest.     MACRO: None.   Signed by: Delano Galeana 4/20/2025 9:15 PM Dictation workstation:   [id number]      Assessment/Plan   Pyuria versus urinary tract infection-history of multiple bacteria per review over the last 7 years-all isolates susceptible to Rocephin    IV ceftriaxone  Follow-up urine culture  Supportive care  Monitor temperature and WBC  Further recommendations based on culture results  I spent 30 minutes in the professional and overall care of this patient.      Speedy Coleman MD       [1]   Family History  Problem Relation Name Age of Onset    Diabetes Father      Coronary artery disease Sister      Coronary artery disease Brother      Glaucoma Brother      Coronary artery disease Other Family Hx     Ovarian cancer Sibling     [2]   Medications Prior to Admission   Medication Sig Dispense Refill Last Dose/Taking    atenolol (Tenormin) 25 mg tablet TAKE ONE (1) TABLET BY MOUTH TWICE DAILY WITH MEALS *NEW PRESCRIPTION REQUEST* 180 tablet 10 4/20/2025    digoxin (Lanoxin) 125 MCG tablet TAKE 1 TABLET BY MOUTH 3 TIMES A WEEK *NEW PRESCRIPTION REQUEST* 12 tablet 10 4/20/2025    Eliquis 5 mg tablet TAKE 1 TABLET BY MOUTH TWICE DAILY *NEW PRESCRIPTION REQUEST* 180 tablet 10 4/20/2025    midodrine (Proamatine) 2.5 mg tablet TAKE 1/2 TABLETS (1.25 MG)  BY MOUTH 2 TIMES DAILY (MORNING AND LATE AFTERNOON). *NEW PRESCRIPTION REQUEST* 90 tablet 10 4/20/2025    multivitamin tablet Take 1 tablet by mouth once daily.   4/20/2025    omeprazole (PriLOSEC) 40 mg DR capsule Take 1 capsule (40 mg) by mouth once daily in the morning. Take before meals. 90 capsule 3 4/20/2025    escitalopram (Lexapro) 10 mg tablet Take 1 tablet (10 mg) by mouth once daily. (Patient not taking: Reported on 12/12/2024) 30 tablet 5    [3] apixaban, 5 mg, oral, BID  atenolol, 12.5 mg, oral, BID after meals  cefTRIAXone, 2 g, intravenous, q24h  [START ON 4/22/2025] digoxin, 125 mcg, oral, Every other day  midodrine, 2.5 mg, oral, BID  pantoprazole, 20 mg, oral, Daily before breakfast    [4]    [5] PRN medications: acetaminophen, alum-mag hydroxide-simeth, meclizine, ondansetron

## 2025-04-21 NOTE — PROGRESS NOTES
04/21/25 1520   Discharge Planning   Living Arrangements Alone   Support Systems Children   Assistance Needed patient has no medical equipment, is independent with ADLs, patient drives and is able to get to all appointments   Type of Residence Private residence   Number of Stairs to Enter Residence 1   Number of Stairs Within Residence 0   Do you have animals or pets at home? No   Who is requesting discharge planning? Provider   Home or Post Acute Services None   Expected Discharge Disposition Home   Does the patient need discharge transport arranged? No   Financial Resource Strain   How hard is it for you to pay for the very basics like food, housing, medical care, and heating? Not hard   Housing Stability   In the last 12 months, was there a time when you were not able to pay the mortgage or rent on time? N   In the past 12 months, how many times have you moved where you were living? 0   At any time in the past 12 months, were you homeless or living in a shelter (including now)? N   Transportation Needs   In the past 12 months, has lack of transportation kept you from medical appointments or from getting medications? no   In the past 12 months, has lack of transportation kept you from meetings, work, or from getting things needed for daily living? No   Patient Choice   Provider Choice list and CMS website (https://medicare.gov/care-compare#search) for post-acute Quality and Resource Measure Data were provided and reviewed with: Patient     TCC met with patient at bedside. Introduced self and explained role. Patient lives home alone. No reports of smoking or alcohol use.  PCP is Ruth Cervantes . [facility] for medications. Patient does not have established LW or POA. TCC and RN witnessed paperwork at this time. Plan is to return home with no service

## 2025-04-21 NOTE — CARE PLAN
The patient's goals for the shift include  rest, comfort, to feel better    The clinical goals for the shift include pt will remain safe and free from injury      Problem: Pain - Adult  Goal: Verbalizes/displays adequate comfort level or baseline comfort level  Outcome: Progressing     Problem: Safety - Adult  Goal: Free from fall injury  Outcome: Progressing     Problem: Discharge Planning  Goal: Discharge to home or other facility with appropriate resources  Outcome: Progressing     Problem: Chronic Conditions and Co-morbidities  Goal: Patient's chronic conditions and co-morbidity symptoms are monitored and maintained or improved  Outcome: Progressing     Problem: Nutrition  Goal: Nutrient intake appropriate for maintaining nutritional needs  Outcome: Progressing     Problem: Fall/Injury  Goal: Not fall by end of shift  Outcome: Progressing  Goal: Be free from injury by end of the shift  Outcome: Progressing  Goal: Verbalize understanding of personal risk factors for fall in the hospital  Outcome: Progressing

## 2025-04-21 NOTE — SIGNIFICANT EVENT
Nurse reached out regarding patient. Per nurse April, patient was ambulating to the restroom and patient started to feel ill with dizziness and started to become diaphoretic. Patient was in afib with RVR rates 150-180's.  Patient was safely assisted back in bed and states that she is feeling some better but does endorse rib cage and abd pain along with burning of her throat sensations. Patient states that this chest pain and sensation is typical and not new for her. EKG was completed which revealed afib with heart rate of 97. No longer in RVR. Patient did receive her pm dose of atenolol. Nurse April also notified cardiology. Consulted EP, Dr. Mcgarry per Dr. Espitia request due to continuous episodes of near syncope and diaphoresis. Changed oral Protonix 20mg to  40mg IV and ordered troponins.

## 2025-04-21 NOTE — ED PROVIDER NOTES
HPI   Chief Complaint   Patient presents with    Weakness, Gen     Pt states she felt weak and dizzy, like she couldn't stand up. Family says she was burning up. Pt denies loss of consciousness and is currently nauseous       HPI  85-year-old female with history of atrial fibrillation, orthostatic hypotension resents with family members brought in by EMS for evaluation of lightheadedness.  Patient reportedly ate Easter dinner with her family and then Commenting that she felt sick and felt very lightheaded and dizzy.  Patient had trouble walking during this thus they did call EMS.  Patient states she has chronic chest pain and shortness of breath no better or worse than normal.  Denies any recent fevers or cough congestion or purulent sputum production.  She states she did not pass out.  No recent lower extremity swelling.  Denies headache, vision change, extremity weakness or numbness.  Denies abdominal pain vomiting or diarrhea.  Otherwise has no acute complaints.      Patient History   Medical History[1]  Surgical History[2]  Family History[3]  Social History[4]    Physical Exam   ED Triage Vitals   Temperature Heart Rate Respirations BP   04/20/25 2015 04/20/25 2013 04/20/25 2013 04/20/25 2013   37.4 °C (99.3 °F) 94 18 154/83      Pulse Ox Temp Source Heart Rate Source Patient Position   04/20/25 2013 04/20/25 2015 -- --   95 % Oral        BP Location FiO2 (%)     -- --             Physical Exam  Vitals and nursing note reviewed.   Constitutional:       General: She is not in acute distress.     Appearance: She is well-developed.   HENT:      Head: Normocephalic and atraumatic.   Eyes:      Conjunctiva/sclera: Conjunctivae normal.   Cardiovascular:      Rate and Rhythm: Normal rate and regular rhythm.      Heart sounds: No murmur heard.  Pulmonary:      Effort: Pulmonary effort is normal. No respiratory distress.      Breath sounds: Normal breath sounds.   Abdominal:      Palpations: Abdomen is soft.       Tenderness: There is no abdominal tenderness.   Musculoskeletal:         General: No swelling.      Cervical back: Neck supple.   Skin:     General: Skin is warm and dry.      Capillary Refill: Capillary refill takes less than 2 seconds.   Neurological:      Mental Status: She is alert.      Comments: Symmetric strength of bilateral upper and lower extremities, intact sensation bilaterally, no vision field deficit or gaze deficit, NIH stroke scale score of 0   Psychiatric:         Mood and Affect: Mood normal.           ED Course & MDM   ED Course as of 04/24/25 0815   Sun Apr 20, 2025 2029 My EKG interpretation:  Atrial fibrillation 90 bpm normal axis QTc 437 no ectopy or acute ischemic changes noted [KW]      ED Course User Index  [KW] Chris Delaney DO         Diagnoses as of 04/24/25 0815   Urinary tract infection without hematuria, site unspecified   Lightheadedness   LAKSHMI (acute kidney injury)   Atrial fibrillation with RVR (Multi)                 No data recorded     Alphonse Coma Scale Score: 15 (04/22/25 1950 : Nicole Elizalde RN)                           Medical Decision Making  Parts of this chart have been completed using voice recognition software. Please excuse any errors of transcription.  My thought process and reason for plan has been formulated from the time that I saw the patient until the time of disposition and is not specific to one specific moment during their visit and furthermore my MDM encompasses this entire chart and not only this text box.      HPI: Detailed above.    Exam: A medically appropriate exam performed, outlined above, given the known history and presentation.    History obtained from: Patient, family    EKG: Interpreted by attending physician and reviewed by me    Social Determinants of Health considered during this visit: Lives independently    Medications given during visit:  Medications   meclizine (Antivert) tablet 25 mg (has no administration in time range)    ondansetron (Zofran) injection 4 mg (has no administration in time range)   acetaminophen (Tylenol) tablet 650 mg (has no administration in time range)   atenolol (Tenormin) tablet 12.5 mg (12.5 mg oral Given 4/23/25 1703)   apixaban (Eliquis) tablet 5 mg (5 mg oral Given 4/23/25 2122)   alum-mag hydroxide-simeth (Mylanta) 200-200-20 mg/5 mL oral suspension 20 mL (has no administration in time range)   digoxin (Lanoxin) tablet 125 mcg (125 mcg oral Given 4/23/25 0845)   pantoprazole (ProtoNix) EC tablet 40 mg (40 mg oral Given 4/24/25 0529)   sodium chloride 0.9 % bolus 500 mL (0 mL intravenous Stopped 4/20/25 2149)   meclizine (Antivert) tablet 25 mg (25 mg oral Given 4/20/25 2048)   ondansetron (Zofran) injection 4 mg (4 mg intravenous Given 4/20/25 2051)   meclizine (Antivert) tablet 25 mg (25 mg oral Given 4/20/25 2251)   cefTRIAXone (Rocephin) 1 g in dextrose (iso) IV 50 mL (0 g intravenous Stopped 4/20/25 2321)   alum-mag hydroxide-simeth (Mylanta) 200-200-20 mg/5 mL oral suspension 10 mL (10 mL oral Given 4/20/25 2253)   cefTRIAXone (Rocephin) vial 2 g (2 g intramuscular Given 4/23/25 1142)        Diagnostic/tests  Labs Reviewed   CBC WITH AUTO DIFFERENTIAL - Abnormal       Result Value    WBC 10.7      nRBC 0.0      RBC 5.03      Hemoglobin 15.2      Hematocrit 45.8      MCV 91      MCH 30.2      MCHC 33.2      RDW 14.2      Platelets 240      Neutrophils % 75.5      Immature Granulocytes %, Automated 0.3      Lymphocytes % 17.4      Monocytes % 5.4      Eosinophils % 1.0      Basophils % 0.4      Neutrophils Absolute 8.07 (*)     Immature Granulocytes Absolute, Automated 0.03      Lymphocytes Absolute 1.86      Monocytes Absolute 0.58      Eosinophils Absolute 0.11      Basophils Absolute 0.04     COMPREHENSIVE METABOLIC PANEL - Abnormal    Glucose 215 (*)     Sodium 142      Potassium 4.0      Chloride 108 (*)     Bicarbonate 25      Anion Gap 13      Urea Nitrogen 16      Creatinine 1.15 (*)     eGFR 47  (*)     Calcium 9.7      Albumin 3.9      Alkaline Phosphatase 67      Total Protein 7.6      AST 21      Bilirubin, Total 0.5      ALT 14     B-TYPE NATRIURETIC PEPTIDE - Abnormal     (*)     Narrative:        <100 pg/mL - Heart failure unlikely  100-299 pg/mL - Intermediate probability of acute heart                  failure exacerbation. Correlate with clinical                  context and patient history.    >=300 pg/mL - Heart Failure likely. Correlate with clinical                  context and patient history.    BNP testing is performed using different testing methodology at [facility] than at other [facility] hospitals. Direct result comparisons should only be made within the same method.      URINALYSIS WITH REFLEX CULTURE AND MICROSCOPIC - Abnormal    Color, Urine Yellow      Appearance, Urine Clear      Specific Gravity, Urine 1.027      pH, Urine 5.0      Protein, Urine 20 (TRACE)      Glucose, Urine Normal      Blood, Urine NEGATIVE      Ketones, Urine TRACE (*)     Bilirubin, Urine NEGATIVE      Urobilinogen, Urine Normal      Nitrite, Urine NEGATIVE      Leukocyte Esterase, Urine 500 Anna/uL (*)    MICROSCOPIC ONLY, URINE - Abnormal    WBC, Urine >50 (*)     RBC, Urine 11-20 (*)     Squamous Epithelial Cells, Urine 1-9 (SPARSE)      Mucus, Urine 4+      Hyaline Casts, Urine 4+ (*)    DIGOXIN - Abnormal    Digoxin  0.42 (*)     Narrative:     Digoxin measurements can be falsely increased or decreased if patient is receiving Digoxin-binding antibody therapy or Digoxin-like immunoreactive factors are present.     CBC WITH AUTO DIFFERENTIAL - Abnormal    WBC 9.8      nRBC 0.0      RBC 4.48      Hemoglobin 13.6      Hematocrit 41.0      MCV 92      MCH 30.4      MCHC 33.2      RDW 14.4      Platelets 212      Neutrophils % 64.8      Immature Granulocytes %, Automated 0.2      Lymphocytes % 21.3      Monocytes % 11.5      Eosinophils % 1.7      Basophils % 0.5      Neutrophils Absolute 6.32  (*)     Immature Granulocytes Absolute, Automated 0.02      Lymphocytes Absolute 2.08      Monocytes Absolute 1.12 (*)     Eosinophils Absolute 0.17      Basophils Absolute 0.05     COMPREHENSIVE METABOLIC PANEL - Abnormal    Glucose 105 (*)     Sodium 143      Potassium 4.0      Chloride 108 (*)     Bicarbonate 27      Anion Gap 12      Urea Nitrogen 15      Creatinine 0.87      eGFR 65      Calcium 8.7      Albumin 3.3 (*)     Alkaline Phosphatase 60      Total Protein 6.3 (*)     AST 15      Bilirubin, Total 0.3      ALT 11     BASIC METABOLIC PANEL - Abnormal    Glucose 115 (*)     Sodium 142      Potassium 3.6      Chloride 109 (*)     Bicarbonate 27      Anion Gap 10      Urea Nitrogen 14      Creatinine 0.72      eGFR 82      Calcium 8.9     URINE CULTURE - Normal    Urine Culture        Value: Clinically insignificant growth based on current clinical standards.   MAGNESIUM - Normal    Magnesium 2.00     LIPASE - Normal    Lipase 37      Narrative:     Venipuncture immediately after or during the administration of Metamizole may lead to falsely low results. Testing should be performed immediately prior to Metamizole dosing.   SERIAL TROPONIN-INITIAL - Normal    Troponin I, High Sensitivity 9      Narrative:     Less than 99th percentile of normal range cutoff-  Female and children under 18 years old <14 ng/L; Male <21 ng/L: Negative  Repeat testing should be performed if clinically indicated.     Female and children under 18 years old 14-50 ng/L; Male 21-50 ng/L:  Consistent with possible cardiac damage and possible increased clinical   risk. Serial measurements may help to assess extent of myocardial damage.     >50 ng/L: Consistent with cardiac damage, increased clinical risk and  myocardial infarction. Serial measurements may help assess extent of   myocardial damage.      NOTE: Children less than 1 year old may have higher baseline troponin   levels and results should be interpreted in conjunction with  the overall   clinical context.     NOTE: Troponin I testing is performed using a different   testing methodology at [facility] than at other   [facility]. Direct result comparisons should only   be made within the same method.   SERIAL TROPONIN, 1 HOUR - Normal    Troponin I, High Sensitivity 8      Narrative:     Less than 99th percentile of normal range cutoff-  Female and children under 18 years old <14 ng/L; Male <21 ng/L: Negative  Repeat testing should be performed if clinically indicated.     Female and children under 18 years old 14-50 ng/L; Male 21-50 ng/L:  Consistent with possible cardiac damage and possible increased clinical   risk. Serial measurements may help to assess extent of myocardial damage.     >50 ng/L: Consistent with cardiac damage, increased clinical risk and  myocardial infarction. Serial measurements may help assess extent of   myocardial damage.      NOTE: Children less than 1 year old may have higher baseline troponin   levels and results should be interpreted in conjunction with the overall   clinical context.     NOTE: Troponin I testing is performed using a different   testing methodology at [facility] than at other   [facility]. Direct result comparisons should only   be made within the same method.   SERIAL TROPONIN-INITIAL - Normal    Troponin I, High Sensitivity 9      Narrative:     Less than 99th percentile of normal range cutoff-  Female and children under 18 years old <14 ng/L; Male <21 ng/L: Negative  Repeat testing should be performed if clinically indicated.     Female and children under 18 years old 14-50 ng/L; Male 21-50 ng/L:  Consistent with possible cardiac damage and possible increased clinical   risk. Serial measurements may help to assess extent of myocardial damage.     >50 ng/L: Consistent with cardiac damage, increased clinical risk and  myocardial infarction. Serial measurements may help assess extent of   myocardial  damage.      NOTE: Children less than 1 year old may have higher baseline troponin   levels and results should be interpreted in conjunction with the overall   clinical context.     NOTE: Troponin I testing is performed using a different   testing methodology at [facility] than at other   [facility] hospitals. Direct result comparisons should only   be made within the same method.   CBC - Normal    WBC 8.3      nRBC 0.0      RBC 4.80      Hemoglobin 14.3      Hematocrit 43.5      MCV 91      MCH 29.8      MCHC 32.9      RDW 14.3      Platelets 215     TROPONIN SERIES- (INITIAL, 1 HR)    Narrative:     The following orders were created for panel order Troponin I Series, High Sensitivity (0, 1 HR).  Procedure                               Abnormality         Status                     ---------                               -----------         ------                     Troponin I, High Sensiti...[[id number]]  Normal              Final result               Troponin, High Sensitivi...[[id number]]  Normal              Final result                 Please view results for these tests on the individual orders.   URINALYSIS WITH REFLEX CULTURE AND MICROSCOPIC    Narrative:     The following orders were created for panel order Urinalysis with Reflex Culture and Microscopic.  Procedure                               Abnormality         Status                     ---------                               -----------         ------                     Urinalysis with Reflex C...[[id number]]  Abnormal            Final result               Extra Urine Gray Tube[[id number]]                            Final result                 Please view results for these tests on the individual orders.   EXTRA URINE GRAY TUBE    Extra Tube Hold for add-ons.     TROPONIN SERIES- (INITIAL, 1 HR)    Narrative:     The following orders were created for panel order Troponin Series, (0, 1 HR).  Procedure                               Abnormality          Status                     ---------                               -----------         ------                     Troponin I, High Sensiti...[[id number]]  Normal              Final result               Troponin, High Sensitivi...[[id number]]                                                   Please view results for these tests on the individual orders.   SERIAL TROPONIN, 1 HOUR      CT head wo IV contrast   Final Result   No acute intracranial abnormality.        Stable moderate burden of supratentorial chronic small vessel   ischemic disease.        MACRO:   None.        Signed by: Delano Galeana 4/20/2025 9:17 PM   Dictation workstation:   [id number]      XR chest 1 view   Final Result   Cardiomegaly without evidence of acute disease in the chest.             MACRO:   None.        Signed by: Delano Galeana 4/20/2025 9:15 PM   Dictation workstation:   [id number]           Considerations/further MDM:  Patient is a 85-year-old female presenting for evaluation of lightheadedness, nausea    I saw this patient in conjunction with Dr. Delaney    Patient awake alert nontoxic-appearing.  Vital signs are within normal limits.  The patient has no focal neurologic deficits on exam to suggest large vessel occlusion.  Her NIH stroke scale score is 0.  She has no truncal ataxia when sitting up in the bed.  Provided IV fluids, meclizine and Zofran for therapy.  Laboratory workup with slight LAKSHMI otherwise unremarkable.  No evidence of ACS.  There is suggestion of urinary tract infection on urinalysis.  Chest x-ray with cardiomegaly but no overt evidence of CHF.  CT head without evidence of mass, midline shift, edema or hemorrhage.  Attempted to ambulate but patient was still slightly unsteady family not comfortable with taking patient home at this time.  Admitted to her primary care provider service Dr. Cervantes for further management.  Patient and family members agreeable with this plan of  care.      Procedure  Procedures       Radha Pompa PA-C  04/20/25 2336       [1]   Past Medical History:  Diagnosis Date    Acute candidiasis of vulva and vagina 09/21/2018    Yeast vaginitis    Body mass index (BMI) 35.0-35.9, adult     BMI 35.0-35.9,adult    Body mass index (BMI) 36.0-36.9, adult     BMI 36.0-36.9,adult    Combined forms of age-related cataract, bilateral 06/09/2022    Combined form of age-related cataract, both eyes    Disorder of the skin and subcutaneous tissue, unspecified 01/12/2017    Skin lesion    Dorsalgia, unspecified 12/09/2013    Pain, upper back    Dyskinesia of esophagus 09/16/2019    Esophageal spasm    Elevated blood-pressure reading, without diagnosis of hypertension 04/08/2019    Elevated blood pressure reading    Encounter for screening for lipoid disorders 04/18/2019    Encounter for lipid screening for cardiovascular disease    Essential (primary) hypertension 09/10/2018    Benign essential hypertension    Irritable bowel syndrome with diarrhea 11/07/2019    Irritable bowel syndrome with diarrhea    Lumbago with sciatica, left side 04/24/2019    Chronic bilateral low back pain with bilateral sciatica    Mastodynia 09/26/2019    Breast tenderness in female    Menopausal and female climacteric states 08/19/2019    Female climacteric state    Other conditions influencing health status 08/30/2018    History of burning on urination    Other conditions influencing health status 09/09/2013    Foot pain, unspecified laterality    Other conditions influencing health status     Colonoscopy (Fiberoptic)    Other conditions influencing health status     Mammogram    Other dysphagia 11/07/2019    Esophageal dysphagia    Other dysphagia 12/10/2019    Esophageal dysphagia    Other specified soft tissue disorders 06/26/2019    Left leg swelling    Pain in right hip 11/13/2019    Right hip pain    Pain in right shoulder 06/26/2019    Chronic right shoulder pain    Pain in thoracic spine  08/26/2020    Chronic bilateral thoracic back pain    Personal history of other diseases of the circulatory system     History of hypertension    Personal history of other diseases of the digestive system 10/03/2019    History of gastroesophageal reflux (GERD)    Personal history of other diseases of the musculoskeletal system and connective tissue 12/15/2020    History of low back pain    Personal history of other drug therapy 10/12/2016    History of influenza vaccination    Personal history of other drug therapy 02/09/2021    History of pneumococcal vaccination    Personal history of other endocrine, nutritional and metabolic disease 09/10/2018    History of vitamin D deficiency    Personal history of other medical treatment 07/20/2017    History of screening mammography    Personal history of other mental and behavioral disorders 08/19/2019    History of anxiety    Personal history of other specified conditions 09/10/2018    History of fatigue    Personal history of other specified conditions 05/01/2019    History of abnormal mammogram    Personal history of other specified conditions 10/19/2017    History of precordial chest pain    Personal history of other specified conditions 07/07/2020    History of dizziness    Personal history of other specified conditions 08/30/2018    History of painful urination    Personal history of other specified conditions 09/23/2018    History of diarrhea    Personal history of urinary (tract) infections 09/10/2018    History of urinary tract infection    Primary osteoarthritis, unspecified hand 07/07/2020    Hand arthritis    Sacrococcygeal disorders, not elsewhere classified 12/15/2020    Sacroiliac joint dysfunction of right side    Trigger finger, unspecified finger 10/12/2016    Trigger finger, acquired    Unspecified atrial fibrillation (Multi) 10/08/2018    New onset a-fib    Unsteadiness on feet 03/11/2020    Unsteadiness on feet    Vitreous degeneration, bilateral  08/26/2019    Degeneration of posterior vitreous body of both eyes   [2]   Past Surgical History:  Procedure Laterality Date    ESOPHAGOGASTRODUODENOSCOPY  03/11/2014    Diagnostic Esophagogastroduodenoscopy    GASTRIC FUNDOPLICATION  05/27/2014    Esophagogastric Fundoplasty Nissen Fundoplication    OTHER SURGICAL HISTORY  05/26/2022    Rotator cuff repair    OTHER SURGICAL HISTORY  05/26/2022    Appendectomy    OTHER SURGICAL HISTORY  05/26/2022    Partial hysterectomy   [3]   Family History  Problem Relation Name Age of Onset    Diabetes Father      Coronary artery disease Sister      Coronary artery disease Brother      Glaucoma Brother      Coronary artery disease Other Family Hx     Ovarian cancer Sibling     [4]   Social History  Tobacco Use    Smoking status: Never     Passive exposure: Never    Smokeless tobacco: Never   Vaping Use    Vaping status: Never Used   Substance Use Topics    Alcohol use: Not Currently    Drug use: Never        Radha Pompa PA-C  04/24/25 0815

## 2025-04-22 PROBLEM — I48.91 ATRIAL FIBRILLATION WITH RVR (MULTI): Status: ACTIVE | Noted: 2025-04-22

## 2025-04-22 LAB
BACTERIA UR CULT: NORMAL
Q ONSET: 216 MS
QRS COUNT: 16 BEATS
QRS DURATION: 84 MS
QT INTERVAL: 324 MS
QTC CALCULATION(BAZETT): 411 MS
QTC FREDERICIA: 380 MS
R AXIS: 13 DEGREES
T AXIS: 240 DEGREES
T OFFSET: 378 MS
VENTRICULAR RATE: 97 BPM

## 2025-04-22 PROCEDURE — 99232 SBSQ HOSP IP/OBS MODERATE 35: CPT | Performed by: NURSE PRACTITIONER

## 2025-04-22 PROCEDURE — 1200000002 HC GENERAL ROOM WITH TELEMETRY DAILY

## 2025-04-22 PROCEDURE — 97165 OT EVAL LOW COMPLEX 30 MIN: CPT | Mod: GO

## 2025-04-22 PROCEDURE — 2500000001 HC RX 250 WO HCPCS SELF ADMINISTERED DRUGS (ALT 637 FOR MEDICARE OP): Performed by: INTERNAL MEDICINE

## 2025-04-22 PROCEDURE — 97161 PT EVAL LOW COMPLEX 20 MIN: CPT | Mod: GP

## 2025-04-22 PROCEDURE — 2500000004 HC RX 250 GENERAL PHARMACY W/ HCPCS (ALT 636 FOR OP/ED): Mod: JZ | Performed by: INTERNAL MEDICINE

## 2025-04-22 PROCEDURE — 2500000001 HC RX 250 WO HCPCS SELF ADMINISTERED DRUGS (ALT 637 FOR MEDICARE OP): Performed by: NURSE PRACTITIONER

## 2025-04-22 PROCEDURE — 2500000004 HC RX 250 GENERAL PHARMACY W/ HCPCS (ALT 636 FOR OP/ED): Mod: JZ | Performed by: NURSE PRACTITIONER

## 2025-04-22 RX ORDER — DIGOXIN 125 MCG
125 TABLET ORAL DAILY
Status: DISCONTINUED | OUTPATIENT
Start: 2025-04-22 | End: 2025-04-24 | Stop reason: HOSPADM

## 2025-04-22 RX ADMIN — APIXABAN 5 MG: 5 TABLET, FILM COATED ORAL at 08:59

## 2025-04-22 RX ADMIN — ATENOLOL 12.5 MG: 25 TABLET ORAL at 17:17

## 2025-04-22 RX ADMIN — PANTOPRAZOLE SODIUM 40 MG: 40 INJECTION, POWDER, FOR SOLUTION INTRAVENOUS at 08:59

## 2025-04-22 RX ADMIN — APIXABAN 5 MG: 5 TABLET, FILM COATED ORAL at 20:16

## 2025-04-22 RX ADMIN — CEFTRIAXONE SODIUM 2 G: 2 INJECTION, SOLUTION INTRAVENOUS at 12:34

## 2025-04-22 RX ADMIN — ATENOLOL 12.5 MG: 25 TABLET ORAL at 08:59

## 2025-04-22 RX ADMIN — DIGOXIN 125 MCG: 125 TABLET ORAL at 08:59

## 2025-04-22 ASSESSMENT — PAIN SCALES - GENERAL
PAINLEVEL_OUTOF10: 0 - NO PAIN

## 2025-04-22 ASSESSMENT — ACTIVITIES OF DAILY LIVING (ADL)
ADL_ASSISTANCE: INDEPENDENT
BATHING_ASSISTANCE: INDEPENDENT
ADL_ASSISTANCE: INDEPENDENT

## 2025-04-22 ASSESSMENT — COGNITIVE AND FUNCTIONAL STATUS - GENERAL
CLIMB 3 TO 5 STEPS WITH RAILING: A LITTLE
MOVING TO AND FROM BED TO CHAIR: A LITTLE
DAILY ACTIVITIY SCORE: 24
DAILY ACTIVITIY SCORE: 24
WALKING IN HOSPITAL ROOM: A LITTLE
CLIMB 3 TO 5 STEPS WITH RAILING: A LITTLE
MOBILITY SCORE: 23
MOBILITY SCORE: 20
STANDING UP FROM CHAIR USING ARMS: A LITTLE

## 2025-04-22 ASSESSMENT — PAIN - FUNCTIONAL ASSESSMENT: PAIN_FUNCTIONAL_ASSESSMENT: 0-10

## 2025-04-22 NOTE — NURSING NOTE
Assumed care for patient at 1900. Patient is in bed. No complaint of pain. Night meds given. Will continue to follow plan of care.

## 2025-04-22 NOTE — PROGRESS NOTES
Occupational Therapy                 Therapy Communication Note    Patient Name: Mercedes Castellanos  MRN: [id number]  Department: [facility]  Room: [address] [place] [address]  Today's Date: 4/22/2025     Discipline: Occupational Therapy    OT Missed Visit: Yes     Missed Visit Reason: Missed Visit Reason: Other (Comment) (Cancel OT Evaluation - per RN pt has elevated HR in the 140's when OOB and currently has high BP at 188/91. RN to medicate.)    Missed Time: Cancel    Comment:

## 2025-04-22 NOTE — PROGRESS NOTES
Mercedes Castellanos is a 85 y.o. female on day 0 of admission presenting with UTI (urinary tract infection).      Subjective   A fib with rvrdr Mcgarry       Objective     Last Recorded Vitals  BP (!) 188/81 (BP Location: Left arm, Patient Position: Lying)   Pulse 71   Temp 36.8 °C (98.2 °F) (Oral)   Resp 18   Wt 78.9 kg (174 lb)   SpO2 97%   Intake/Output last 3 Shifts:  No intake or output data in the 24 hours ending 04/22/25 1026    Admission Weight  Weight: 78.9 kg (174 lb) (04/20/25 2013)    Daily Weight  04/20/25 : 78.9 kg (174 lb)    Image Results  Electrocardiogram, 12-lead PRN ACS symptoms  Atrial fibrillation  ST & T wave abnormality, consider inferolateral ischemia  Abnormal ECG  When compared with ECG of 21-APR-2025 06:31, (unconfirmed)  Significant changes have occurred      Physical Exam    Relevant Results                              Assessment & Plan  UTI (urinary tract infection)    Atrial fibrillation with RVR (Multi)    Better less tachycardia           Ruth Cervantes MD

## 2025-04-22 NOTE — PROGRESS NOTES
Physical Therapy    Physical Therapy Evaluation    Patient Name: Mercedes Castellanos  MRN: [id number]  Department: [address]  Room: 402/402-B  Today's Date: 4/22/2025   Time Calculation  Start Time: 1002  Stop Time: 1017  Time Calculation (min): 15 min    Assessment/Plan   PT Assessment  PT Assessment Results: Decreased strength, Impaired balance, Decreased mobility, Decreased coordination  Rehab Prognosis: Good  Barriers to Discharge Home: Physical needs  Physical Needs: Intermittent mobility assistance needed  Evaluation/Treatment Tolerance: Patient tolerated treatment well  Medical Staff Made Aware: Yes  Barriers to Participation: Comorbidities  End of Session Communication: Bedside nurse  Assessment Comment: Anticipate safe discharge home with intermittent assist/support. pt may benefit from using cane for longer distances.  End of Session Patient Position: Up in chair, Alarm on (call button in reach)  IP OR SWING BED PT PLAN  Inpatient or Swing Bed: Inpatient    PT Plan  Treatment/Interventions: Bed mobility, Transfer training, Gait training, Strengthening, Endurance training, Therapeutic exercise  PT Plan: Ongoing PT  PT Frequency: 3 times per week  PT Discharge Recommendations: Low intensity level of continued care  Equipment Recommended upon Discharge:  (LRAD)  PT Recommended Transfer Status:  (CGA)  PT - OK to Discharge: Yes    Subjective   General Visit Information:  General  Reason for Referral: pt is a 84 y/o female admitted with UTI; c/o dizziness, lightheadedness and nausea; pt found to have A-Fib; impaired mobility  Referred By: Dr. Cervantes  Past Medical History Relevant to Rehab: COVID; cystitis; L foot fracture; A-fib; CM; LAKSHMI; anxiety; GERD; osteopenia; HTN; OH; spinal stenosis    Family/Caregiver Present: No  Co-Treatment: OT  Prior to Session Communication: Bedside nurse  Patient Position Received: Bed, 2 rail up, Alarm on  General Comment: pt cooperative and eager to get OOB; pt asked to use bathroom. RN  cleared pt for therapy    Home Living:  Home Living  Type of Home: [place]  Lives With: Alone  Home Adaptive Equipment:  (owns cane and RW)  Home Layout: One level  Home Access: Level entry  Bathroom Shower/Tub: Tub/shower unit  Bathroom Equipment: Grab bars in shower  Home Living Comments: pt has laundry facility in her  condo.    Prior Level of Function:  Prior Function Per Pt/Caregiver Report  Level of [place]: Independent with ADLs and functional transfers, Independent with homemaking with ambulation  Receives Help From:  (pts daughter checks in daily and assists as needed)  ADL Assistance: Independent  Homemaking Assistance: Independent  Ambulatory Assistance: Independent (uses no assistive device)  Vocational: Retired  Prior Function Comments: pt reported being active and driving in community    Precautions:  Precautions  Hearing/Visual Limitations: wears glasses ; mild [place]  Medical Precautions: Fall precautions      Date/Time Vitals Session Patient Position Pulse Resp SpO2 BP MAP (mmHg)    04/22/25 1002 --  --  80  --  --  172/95  --                 Objective   Pain:  Pain Assessment  Pain Assessment:  (0/10)  Cognition:  Cognition  Overall Cognitive Status: Within Functional Limits  Orientation Level: Oriented X4  Insight: Within function limits    General Assessments:        Activity Tolerance  Endurance:  (GOOD activity tolerance)    Sensation  Sensation Comment: denies any numbness/tingling       Coordination  Coordination Comment: short B step length    Postural Control  Posture Comment: mild kyphosis    Static Sitting Balance  Static Sitting-Comment/Number of Minutes: WNL  Dynamic Sitting Balance  Dynamic Sitting-Comments: GOOD+    Static Standing Balance  Static Standing-Comment/Number of Minutes: GOOD  Dynamic Standing Balance  Dynamic Standing-Comments: GOOD-      Functional Assessments:  Bed Mobility  Bed Mobility:  (supine to sit with MOD INDEPENDENT. pt took seated rest  break.)    Transfers  Transfer:  (sit <-> stand with DIST SUPERVISION)    Ambulation/Gait Training  Ambulation/Gait Training Performed:  pt amb 15' x 1 to bathroom with CGA; pt independent with toileting. pt amb additional 25' x 1 without assistive device and CGA/MIN A. pt presented with short B step length, mild scissoring (Left over Right) and mild lateral sway . VC to widen KARMEN. no major LOB noted.       Extremity/Trunk Assessments:  RUE   RUE : Within Functional Limits  LUE   LUE: Within Functional Limits  RLE   RLE :  (WFL with 4-/5 strength; mild swelling noted)  LLE   LLE :  (WFL with 4-/5 strength; mild swelling noted)      Outcome Measures:  [facility] Basic Mobility  Turning from your back to your side while in a flat bed without using bedrails: None  Moving from lying on your back to sitting on the side of a flat bed without using bedrails: None  Moving to and from bed to chair (including a wheelchair): A little  Standing up from a chair using your arms (e.g. wheelchair or bedside chair): A little  To walk in hospital room: A little  Climbing 3-5 steps with railing: A little  Basic Mobility - Total Score: 20    Encounter Problems       Encounter Problems (Active)       Mobility       STG - Patient will ambulate 150' x 1 using LRAD vs no Assistive Device with MOD INDEPENDENT (Progressing)       Start:  04/22/25    Expected End:  05/06/25               PT Transfers       STG - Patient to transfer to and from sit to supine  INDEPENDENTLY (Progressing)       Start:  04/22/25    Expected End:  05/06/25            STG - Patient will transfer sit to and from stand INDEPENDENTLY (Progressing)       Start:  04/22/25    Expected End:  05/06/25               Pain - Adult              Education Documentation  Precautions, taught by Carmelo Esteves, PT at 4/22/2025 11:05 AM.  Learner: Patient  Readiness: Eager  Method: Explanation  Response: Verbalizes Understanding    Mobility Training, taught by Carmelo Esteves, PT at  4/22/2025 11:05 AM.  Learner: Patient  Readiness: Eager  Method: Explanation  Response: Verbalizes Understanding    Education Comments  No comments found.

## 2025-04-22 NOTE — PROGRESS NOTES
Mercedes Castellanos is a 85 y.o. female on day 0 of admission presenting with UTI (urinary tract infection).    Subjective   Interval History:   Afebrile, no chills  No chest pain or shortness of breath  No nausea vomiting or diarrhea  Feeling better    Review of Systems   All other systems reviewed and are negative.      Objective   Range of Vitals (last 24 hours)  Heart Rate:  [69-96]   Temp:  [37 °C (98.6 °F)-37.1 °C (98.8 °F)]   Resp:  [16-18]   BP: (112-155)/(57-76)   SpO2:  [96 %-99 %]   Daily Weight  04/20/25 : 78.9 kg (174 lb)    Body mass index is 30.82 kg/m².    Physical Exam  Constitutional:       Appearance: Normal appearance.   HENT:      Head: Normocephalic and atraumatic.      Nose: Nose normal.   Eyes:      General: No scleral icterus.     Extraocular Movements: Extraocular movements intact.      Conjunctiva/sclera: Conjunctivae normal.   Cardiovascular:      Rate and Rhythm: Normal rate and regular rhythm.      Heart sounds: Normal heart sounds.   Pulmonary:      Effort: Pulmonary effort is normal.      Breath sounds: Normal breath sounds.   Abdominal:      General: Bowel sounds are normal.      Palpations: Abdomen is soft.   Musculoskeletal:      Cervical back: Normal range of motion and neck supple.      Right lower leg: No edema.      Left lower leg: No edema.   Skin:     General: Skin is warm and dry.   Neurological:      Mental Status: She is alert.   Psychiatric:         Behavior: Behavior normal.     Antibiotics  cefTRIAXone - 2 gram/50 mL    Relevant Results  Labs  Results from last 72 hours   Lab Units 04/21/25  1116 04/20/25 2028   WBC AUTO x10*3/uL 9.8 10.7   HEMOGLOBIN g/dL 13.6 15.2   HEMATOCRIT % 41.0 45.8   PLATELETS AUTO x10*3/uL 212 240   NEUTROS PCT AUTO % 64.8 75.5   LYMPHS PCT AUTO % 21.3 17.4   MONOS PCT AUTO % 11.5 5.4   EOS PCT AUTO % 1.7 1.0     Results from last 72 hours   Lab Units 04/21/25  0616 04/20/25 2028   SODIUM mmol/L 143 142   POTASSIUM mmol/L 4.0 4.0   CHLORIDE  mmol/L 108* 108*   CO2 mmol/L 27 25   BUN mg/dL 15 16   CREATININE mg/dL 0.87 1.15*   GLUCOSE mg/dL 105* 215*   CALCIUM mg/dL 8.7 9.7   ANION GAP mmol/L 12 13   EGFR mL/min/1.73m*2 65 47*     Results from last 72 hours   Lab Units 04/21/25  0616 04/20/25 2028   ALK PHOS U/L 60 67   BILIRUBIN TOTAL mg/dL 0.3 0.5   PROTEIN TOTAL g/dL 6.3* 7.6   ALT U/L 11 14   AST U/L 15 21   ALBUMIN g/dL 3.3* 3.9     Estimated Creatinine Clearance: 47 mL/min (by C-G formula based on SCr of 0.87 mg/dL).  C-Reactive Protein   Date Value Ref Range Status   09/24/2024 4.96 (H) <1.00 mg/dL Final     Microbiology  Reviewed-negative urine culture  Imaging  Electrocardiogram, 12-lead PRN ACS symptoms  Result Date: 4/22/2025  Atrial fibrillation ST & T wave abnormality, consider inferolateral ischemia Abnormal ECG Confirmed by Khris Christian ([id number]) on 4/22/2025 11:51:08 AM    ECG 12 lead  Result Date: 4/21/2025  Atrial fibrillation Nonspecific ST abnormality Abnormal ECG When compared with ECG of 23-SEP-2024 23:59, Nonspecific T wave abnormality now evident in Inferior leads Confirmed by Nirmal Alonso ([id number]) on 4/21/2025 2:30:08 PM    CT head wo IV contrast  Result Date: 4/20/2025  Interpreted By:  Delano Galeana, STUDY: CT HEAD WO IV CONTRAST;  4/20/2025 9:10 pm   INDICATION: Signs/Symptoms:Dizziness, difficulty ambulating.     COMPARISON: 12/12/2024   ACCESSION NUMBER(S): [id number]   ORDERING CLINICIAN: RAVI FRAZIER   TECHNIQUE: Noncontrast axial CT images of head were obtained with coronal and sagittal reconstructed images.   FINDINGS: BRAIN PARENCHYMA: Moderate periventricular and subcortical hemispheric white matter hypodensities are most compatible with chronic small vessel ischemic disease. No acute intraparenchymal hemorrhage or parenchymal evidence of acute large territory ischemic infarct. Gray-white matter distinction is preserved. No mass-effect.   VENTRICLES and EXTRA-AXIAL SPACES:  No acute extra-axial or intraventricular  hemorrhage. No effacement of cerebral sulci. The ventricles and sulci are age-concordant.   PARANASAL SINUSES/MASTOIDS:  No hemorrhage or air-fluid levels within the visualized paranasal sinuses. The mastoids are well aerated.   CALVARIUM/ORBITS:  No acute skull fracture.  The orbits and globes are intact to the extent visualized.   EXTRACRANIAL SOFT TISSUES: No discernible acute abnormality.       No acute intracranial abnormality.   Stable moderate burden of supratentorial chronic small vessel ischemic disease.   MACRO: None.   Signed by: Delano Galeana 4/20/2025 9:17 PM Dictation workstation:   [id number]    XR chest 1 view  Result Date: 4/20/2025  Interpreted By:  Delano Galeana, STUDY: XR CHEST 1 VIEW;  4/20/2025 8:43 pm   INDICATION: Signs/Symptoms:Dizziness.     COMPARISON: 09/24/2024   ACCESSION NUMBER(S): [id number]   ORDERING CLINICIAN: RAVI FRAZIER   FINDINGS:     Cardiomegaly. The pulmonary vasculature is within normal limits. Small hiatal hernia again noted. Atherosclerotic aorta. No consolidation, pleural effusion or pneumothorax.   Suture anchors in the left greater tuberosity noted.       Cardiomegaly without evidence of acute disease in the chest.     MACRO: None.   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

## 2025-04-22 NOTE — PROGRESS NOTES
Occupational Therapy    Evaluation    Patient Name: Mercedes Castellanos  MRN: [id number]  Department: [facility] 4 S  Room: [facility]402-B  Today's Date: 4/22/2025  Time Calculation  Start Time: 1003  Stop Time: 1018  Time Calculation (min): 15 min    Assessment  IP OT Assessment  OT Assessment: OT order received, chart reviewed, evaluation completed. Pt demonstrated independence in ADLs and functional transfers no acute OT needs identified will D/C OT.  Prognosis: Excellent  Barriers to Discharge Home: No anticipated barriers  Evaluation/Treatment Tolerance: Patient tolerated treatment well  Medical Staff Made Aware: Yes  End of Session Communication: Bedside nurse  End of Session Patient Position: Up in chair, Alarm on (call light at hand)  Plan:  No Skilled OT: At baseline function  OT Frequency: OT eval only  OT Discharge Recommendations: No further acute OT  OT Recommended Transfer Status: Independent  OT - OK to Discharge: Yes    Subjective   Current Problem:  1. Atrial fibrillation with RVR (Multi)        2. Urinary tract infection without hematuria, site unspecified        3. Lightheadedness        4. LAKSHMI (acute kidney injury)          General:  General  Reason for Referral: pt is a 84 y/o female admitted with UTI; c/o dizziness, lightheadedness and nausea; pt found to have A-Fib;activities of daily living  Referred By: Dr. Cervantes  Past Medical History Relevant to Rehab: COVID; cystitis; L foot fracture; A-fib; CM; LAKSHMI; anxiety; GERD; osteopenia; HTN; [place]; spinal stenosis  OT Missed Visit: Yes  Missed Visit Reason: Other (Comment) (Cancel OT Evaluation - per RN pt has elevated HR in the 140's when OOB and currently has high BP at 188/91. RN to medicate.)  Family/Caregiver Present: No  Co-Treatment: PT  Co-Treatment Reason: maximization of pt outcomes  Prior to Session Communication: Bedside nurse  Patient Position Received: Bed, 2 rail up, Alarm on  General Comment: pt cooperative and eager to get OOB; pt asked to use bathroom.  RN cleared pt for therapy  Precautions:  Hearing/Visual Limitations: wears glasses ; mild [place]  Medical Precautions: Fall precautions           Pain:  Pain Assessment  Pain Assessment: 0-10  0-10 (Numeric) Pain Score: 0 - No pain    Objective   Cognition:  Overall Cognitive Status: Within Functional Limits  Orientation Level: Oriented X4  Insight: Within function limits           Home Living:  Type of Home: Cond  Lives With: Alone  Home Adaptive Equipment: Cane, Walker rolling or standard  Home Layout: One level  Home Access: Level entry  Bathroom Shower/Tub: Tub/shower unit  Bathroom Equipment: Grab bars in shower  Home Living Comments: pt has laundry facility in her  condo   Prior Function:  Level of [place]: Independent with ADLs and functional transfers, Independent with homemaking with ambulation  Receives Help From:  (pts daughter checks in daily and assists as needed)  ADL Assistance: Independent  Homemaking Assistance: Independent  Ambulatory Assistance: Independent  Vocational: Retired  Prior Function Comments: pt reported being active and driving in community  IADL History:     ADL:  Eating Assistance: Independent  Grooming Assistance: Independent  Bathing Assistance: Independent  UE Dressing Assistance: Independent  LE Dressing Assistance: Independent  Toileting Assistance with Device: Independent  Functional Assistance: Independent  Activity Tolerance:  Endurance: Endurance does not limit participation in activity  Rate of Perceived Exertion (RPE): 3/10  Bed Mobility/Transfers: Bed Mobility  Bed Mobility: Yes  Bed Mobility 1  Bed Mobility 1: Supine to sitting  Level of Assistance 1: Modified independent  Bed Mobility Comments 1: HOB elevated    Transfers  Transfer: Yes  Transfer 1  Transfer From 1: Bed to  Transfer to 1: Stand  Technique 1: Sit to stand, Stand to sit  Transfer Level of Assistance 1: Independent      Functional Mobility:  Functional Mobility  Functional Mobility Performed:  Yes  Functional Mobility 1  Surface 1: Level tile  Device 1: No device  Assistance 1: Independent  Comments 1: Pt performed functional mobility community distance without device.    Sensation:  Light Touch: No apparent deficits  Sensation Comment: denies any numbness/tingling  Strength:  Strength Comments: WFL  Perception:     Coordination:  Movements are Fluid and Coordinated: Yes   Hand Function:  Hand Function  Gross Grasp: Functional  Coordination: Functional  Extremities: RUE   RUE : Within Functional Limits and LUE   LUE: Within Functional Limits    Outcome Measures: [facility] Daily Activity  Putting on and taking off regular lower body clothing: None  Bathing (including washing, rinsing, drying): None  Putting on and taking off regular upper body clothing: None  Toileting, which includes using toilet, bedpan or urinal: None  Taking care of personal grooming such as brushing teeth: None  Eating Meals: None  Daily Activity - Total Score: 24      Education Documentation  ADL Training, taught by Carmen Allison OT at 4/22/2025  2:49 PM.  Learner: Patient  Readiness: Acceptance  Method: Explanation  Response: Verbalizes Understanding  Comment: Pt edu on OT POC
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
## 2025-04-22 NOTE — PROGRESS NOTES
Spiritual Care Visit  Spiritual Care Request    Reason for Visit:  Routine Visit: Introduction     Request Received From:       Focus of Care:  Visited With: Patient         Refer to :          Spiritual Care Assessment    Spiritual Assessment:                      Care Provided:       Sense of Community and or Yazidism Affiliation:  Mosque   Values/Beliefs  Spiritual Requests During Hospitalization: Mercedes did not want any sacrament from the [employer].     Addressed Needs/Concerns and/or Popeye Through:     Sacramental Encounters  Communion: Does not want communion  Communion Given Indicator: No  Sacrament of Sick-Anointing: Patient declined anointing    Outcome:        Advance Directives:         Spiritual Care Annotation    Annotation:  Trang did not want any sacraments from the [employer].  Thomas Puentes

## 2025-04-22 NOTE — PROGRESS NOTES
Physical Therapy                 Therapy Communication Note    Patient Name: Mercedes Castellanos  MRN: [id number]  Department: [facility] [address]  Room: [address] [place] [address]  Today's Date: 4/22/2025     Discipline: Physical Therapy    PT Missed Visit: Yes      Missed Visit Reason:  (Cancel PT Evaluation - per RN pt has elevated HR in the 140's when OOB and currently has high BP at 188/91. RN to medicate.)

## 2025-04-22 NOTE — CONSULTS
Inpatient consult to Cardiology  Consult performed by: Elise Watts MD  Consult ordered by: Ruth Cervantes MD  Reason for consult: Afib      Patient evaluated by cardiology (Dr Ramon and Nayely Sarabia). I agree with the assessment. Please contact EP if any changes. Thank you for the consult.

## 2025-04-22 NOTE — CARE PLAN
Problem: Pain - Adult  Goal: Verbalizes/displays adequate comfort level or baseline comfort level  Outcome: Progressing     Problem: Safety - Adult  Goal: Free from fall injury  Outcome: Progressing     Problem: Discharge Planning  Goal: Discharge to home or other facility with appropriate resources  Outcome: Progressing     Problem: Chronic Conditions and Co-morbidities  Goal: Patient's chronic conditions and co-morbidity symptoms are monitored and maintained or improved  Outcome: Progressing     Problem: Nutrition  Goal: Nutrient intake appropriate for maintaining nutritional needs  Outcome: Progressing     Problem: Fall/Injury  Goal: Not fall by end of shift  Outcome: Progressing  Goal: Be free from injury by end of the shift  Outcome: Progressing  Goal: Verbalize understanding of personal risk factors for fall in the hospital  Outcome: Progressing   The patient's goals for the shift include      The clinical goals for the shift include Safety  =  '

## 2025-04-22 NOTE — CARE PLAN
The patient's goals for the shift include      The clinical goals for the shift include SAFETY      Problem: Pain - Adult  Goal: Verbalizes/displays adequate comfort level or baseline comfort level  Outcome: Progressing     Problem: Safety - Adult  Goal: Free from fall injury  Outcome: Progressing     Problem: Discharge Planning  Goal: Discharge to home or other facility with appropriate resources  Outcome: Progressing     Problem: Chronic Conditions and Co-morbidities  Goal: Patient's chronic conditions and co-morbidity symptoms are monitored and maintained or improved  Outcome: Progressing     Problem: Nutrition  Goal: Nutrient intake appropriate for maintaining nutritional needs  Outcome: Progressing     Problem: Fall/Injury  Goal: Not fall by end of shift  Outcome: Progressing  Goal: Be free from injury by end of the shift  Outcome: Progressing  Goal: Verbalize understanding of personal risk factors for fall in the hospital  Outcome: Progressing

## 2025-04-22 NOTE — PROGRESS NOTES
Spiritual Care Visit  Spiritual Care Request    Reason for Visit:  Routine Visit: Introduction     Request Received From:       Focus of Care:  Visited With: Patient         Refer to :          Spiritual Care Assessment    Spiritual Assessment:                      Care Provided:  Intended Effects: Convey a calming presence  Methods: Offer emotional support  Interventions: Explain  role    Sense of Community and or Hindu Affiliation:  Spiritism         Addressed Needs/Concerns and/or Popeye Through:          Outcome:  Outcome of Spiritual Care Visit: Identifying spiritual/emotional issues, Comfort/healing presence     Advance Directives:         Spiritual Care Annotation       Annotation:  provided patient support while rounding the Unit.  introduced  services of [facility].    explained the role of the  in providing emotional and spiritual support for patient's and family while in admitted to the hospital.     Patient was seated in the chair next to his hospital bed. Patient states that she passed out and was not sure what happened. Patient lives independent. Family is supportive. Patient talked about her grief for the death of her son. Patient expressed appreciation for the visit.     No spiritual or Jewish needs were expressed. Spiritual care will remain available for support as requested.

## 2025-04-23 PROCEDURE — 99221 1ST HOSP IP/OBS SF/LOW 40: CPT | Performed by: INTERNAL MEDICINE

## 2025-04-23 PROCEDURE — 2500000004 HC RX 250 GENERAL PHARMACY W/ HCPCS (ALT 636 FOR OP/ED): Mod: JZ | Performed by: INTERNAL MEDICINE

## 2025-04-23 PROCEDURE — 2500000001 HC RX 250 WO HCPCS SELF ADMINISTERED DRUGS (ALT 637 FOR MEDICARE OP): Performed by: NURSE PRACTITIONER

## 2025-04-23 PROCEDURE — 97116 GAIT TRAINING THERAPY: CPT | Mod: GP

## 2025-04-23 PROCEDURE — 2500000001 HC RX 250 WO HCPCS SELF ADMINISTERED DRUGS (ALT 637 FOR MEDICARE OP): Performed by: INTERNAL MEDICINE

## 2025-04-23 PROCEDURE — 1200000002 HC GENERAL ROOM WITH TELEMETRY DAILY

## 2025-04-23 PROCEDURE — 2500000001 HC RX 250 WO HCPCS SELF ADMINISTERED DRUGS (ALT 637 FOR MEDICARE OP): Performed by: PHARMACIST

## 2025-04-23 PROCEDURE — 99232 SBSQ HOSP IP/OBS MODERATE 35: CPT | Performed by: PHYSICIAN ASSISTANT

## 2025-04-23 RX ORDER — PANTOPRAZOLE SODIUM 40 MG/1
40 TABLET, DELAYED RELEASE ORAL
Status: DISCONTINUED | OUTPATIENT
Start: 2025-04-23 | End: 2025-04-24 | Stop reason: HOSPADM

## 2025-04-23 RX ORDER — CEFTRIAXONE 1 G/1
2 INJECTION, POWDER, FOR SOLUTION INTRAMUSCULAR; INTRAVENOUS ONCE
Status: COMPLETED | OUTPATIENT
Start: 2025-04-23 | End: 2025-04-23

## 2025-04-23 RX ADMIN — PANTOPRAZOLE SODIUM 40 MG: 40 TABLET, DELAYED RELEASE ORAL at 08:49

## 2025-04-23 RX ADMIN — CEFTRIAXONE SODIUM 2 G: 1 INJECTION, POWDER, FOR SOLUTION INTRAMUSCULAR; INTRAVENOUS at 11:42

## 2025-04-23 RX ADMIN — APIXABAN 5 MG: 5 TABLET, FILM COATED ORAL at 08:45

## 2025-04-23 RX ADMIN — ATENOLOL 12.5 MG: 25 TABLET ORAL at 17:03

## 2025-04-23 RX ADMIN — APIXABAN 5 MG: 5 TABLET, FILM COATED ORAL at 21:22

## 2025-04-23 RX ADMIN — ATENOLOL 12.5 MG: 25 TABLET ORAL at 08:45

## 2025-04-23 RX ADMIN — DIGOXIN 125 MCG: 125 TABLET ORAL at 08:45

## 2025-04-23 ASSESSMENT — COGNITIVE AND FUNCTIONAL STATUS - GENERAL
CLIMB 3 TO 5 STEPS WITH RAILING: A LITTLE
DRESSING REGULAR UPPER BODY CLOTHING: A LITTLE
MOBILITY SCORE: 23
DAILY ACTIVITIY SCORE: 23
CLIMB 3 TO 5 STEPS WITH RAILING: A LITTLE
MOBILITY SCORE: 23

## 2025-04-23 ASSESSMENT — PAIN SCALES - GENERAL
PAINLEVEL_OUTOF10: 0 - NO PAIN

## 2025-04-23 ASSESSMENT — PAIN - FUNCTIONAL ASSESSMENT: PAIN_FUNCTIONAL_ASSESSMENT: 0-10

## 2025-04-23 NOTE — PROGRESS NOTES
Mercedes Castellanos is a 85 y.o. female on day 1 of admission presenting with UTI (urinary tract infection).    Subjective   Interval History:   Afebrile, no chills  Denies any shortness of breath or cough  Denies nausea, vomiting, diarrhea, abdominal pain  Denies any dysuria urinary urgency, frequency, or hematuria    Review of Systems   All other systems reviewed and are negative.      Objective   Range of Vitals (last 24 hours)  Heart Rate:  [73-82]   Temp:  [36.8 °C (98.2 °F)-37 °C (98.6 °F)]   Resp:  [20-21]   BP: (145-155)/(74-93)   SpO2:  [97 %-100 %]   Daily Weight  04/20/25 : 78.9 kg (174 lb)    Body mass index is 30.82 kg/m².    Physical Exam  Constitutional:       Appearance: Normal appearance.   HENT:      Head: Normocephalic and atraumatic.      Nose: Nose normal.   Eyes:      General: No scleral icterus.     Extraocular Movements: Extraocular movements intact.      Conjunctiva/sclera: Conjunctivae normal.   Cardiovascular:      Rate and Rhythm: Normal rate and regular rhythm.      Heart sounds: Normal heart sounds.   Pulmonary:      Effort: Pulmonary effort is normal.      Breath sounds: Normal breath sounds.   Abdominal:      General: Bowel sounds are normal.      Palpations: Abdomen is soft.   Musculoskeletal:      Cervical back: Normal range of motion and neck supple.      Right lower leg: No edema.      Left lower leg: No edema.   Skin:     General: Skin is warm and dry.   Neurological:      Mental Status: She is alert.   Psychiatric:         Behavior: Behavior normal.     Antibiotics  cefTRIAXone - 1 gram    Relevant Results  Labs  Results from last 72 hours   Lab Units 04/21/25  1116 04/20/25 2028   WBC AUTO x10*3/uL 9.8 10.7   HEMOGLOBIN g/dL 13.6 15.2   HEMATOCRIT % 41.0 45.8   PLATELETS AUTO x10*3/uL 212 240   NEUTROS PCT AUTO % 64.8 75.5   LYMPHS PCT AUTO % 21.3 17.4   MONOS PCT AUTO % 11.5 5.4   EOS PCT AUTO % 1.7 1.0     Results from last 72 hours   Lab Units 04/21/25  0616 04/20/25 2028    SODIUM mmol/L 143 142   POTASSIUM mmol/L 4.0 4.0   CHLORIDE mmol/L 108* 108*   CO2 mmol/L 27 25   BUN mg/dL 15 16   CREATININE mg/dL 0.87 1.15*   GLUCOSE mg/dL 105* 215*   CALCIUM mg/dL 8.7 9.7   ANION GAP mmol/L 12 13   EGFR mL/min/1.73m*2 65 47*     Results from last 72 hours   Lab Units 04/21/25  0616 04/20/25 2028   ALK PHOS U/L 60 67   BILIRUBIN TOTAL mg/dL 0.3 0.5   PROTEIN TOTAL g/dL 6.3* 7.6   ALT U/L 11 14   AST U/L 15 21   ALBUMIN g/dL 3.3* 3.9     Estimated Creatinine Clearance: 47 mL/min (by C-G formula based on SCr of 0.87 mg/dL).  C-Reactive Protein   Date Value Ref Range Status   09/24/2024 4.96 (H) <1.00 mg/dL Final     Microbiology  Reviewed-negative urine culture  Imaging  Electrocardiogram, 12-lead PRN ACS symptoms  Result Date: 4/22/2025  Atrial fibrillation ST & T wave abnormality, consider inferolateral ischemia Abnormal ECG Confirmed by Khris Christian ([id number]) on 4/22/2025 11:51:08 AM    ECG 12 lead  Result Date: 4/21/2025  Atrial fibrillation Nonspecific ST abnormality Abnormal ECG When compared with ECG of 23-SEP-2024 23:59, Nonspecific T wave abnormality now evident in Inferior leads Confirmed by Nirmal Alonso ([id number]) on 4/21/2025 2:30:08 PM    CT head wo IV contrast  Result Date: 4/20/2025  Interpreted By:  Delano Galeana, STUDY: CT HEAD WO IV CONTRAST;  4/20/2025 9:10 pm   INDICATION: Signs/Symptoms:Dizziness, difficulty ambulating.     COMPARISON: 12/12/2024   ACCESSION NUMBER(S): [id number]   ORDERING CLINICIAN: RAVI FRAZIER   TECHNIQUE: Noncontrast axial CT images of head were obtained with coronal and sagittal reconstructed images.   FINDINGS: BRAIN PARENCHYMA: Moderate periventricular and subcortical hemispheric white matter hypodensities are most compatible with chronic small vessel ischemic disease. No acute intraparenchymal hemorrhage or parenchymal evidence of acute large territory ischemic infarct. Gray-white matter distinction is preserved. No mass-effect.   VENTRICLES and  EXTRA-AXIAL SPACES:  No acute extra-axial or intraventricular hemorrhage. No effacement of cerebral sulci. The ventricles and sulci are age-concordant.   PARANASAL SINUSES/MASTOIDS:  No hemorrhage or air-fluid levels within the visualized paranasal sinuses. The mastoids are well aerated.   CALVARIUM/ORBITS:  No acute skull fracture.  The orbits and globes are intact to the extent visualized.   EXTRACRANIAL SOFT TISSUES: No discernible acute abnormality.       No acute intracranial abnormality.   Stable moderate burden of supratentorial chronic small vessel ischemic disease.   MACRO: None.   Signed by: Delano Galeana 4/20/2025 9:17 PM Dictation workstation:   [id number]    XR chest 1 view  Result Date: 4/20/2025  Interpreted By:  Delano Galeana, STUDY: XR CHEST 1 VIEW;  4/20/2025 8:43 pm   INDICATION: Signs/Symptoms:Dizziness.     COMPARISON: 09/24/2024   ACCESSION NUMBER(S): [id number]   ORDERING CLINICIAN: RAVI FRAZIER   FINDINGS:     Cardiomegaly. The pulmonary vasculature is within normal limits. Small hiatal hernia again noted. Atherosclerotic aorta. No consolidation, pleural effusion or pneumothorax.   Suture anchors in the left greater tuberosity noted.       Cardiomegaly without evidence of acute disease in the chest.     MACRO: None.   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  This includes time spent before the visit reviewing the chart, time spent during the visit, and time spent after the visit on documentation.        Dora Watts, APRN-CNP
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
## 2025-04-23 NOTE — CONSULTS
Inpatient consult to Cardiology  Consult performed by: Madan Mcgarry MD  Consult ordered by: Ruth Cervantes MD  Reason for consult: Syncope and atrial fibrillation  Assessment/Recommendations: Continue current medications        History Of Present Illness:    Mercedes Castellanos is a 85 y.o. female with a history of longstanding persistent atrial fibrillation for at least 7 years.  She was admitted with a syncopal episode.  Patient has no recollection of the events that led to her syncope.  She had no urinary or stool incontinence.  All she can recall is that her hydration with p.o. intake was suboptimal the afternoon of the event.  She never had syncopal episodes prior.  This is in the setting of preserved LV systolic function and no significant valvular heart disease.  She has had some atrial fibrillation with rapid rates but overall her average ventricular response is more than adequate..     Last Recorded Vitals:  Vitals:    04/22/25 1513 04/22/25 2321 04/23/25 0745 04/23/25 1626   BP: 145/74 (!) 155/93 147/85 151/67   BP Location: Left arm  Left arm Left arm   Patient Position: Lying  Lying Lying   Pulse: 82 73 76 73   Resp: 20 21 21 20   Temp: 36.8 °C (98.2 °F) 37 °C (98.6 °F) 36.8 °C (98.2 °F) 36.8 °C (98.2 °F)   TempSrc: Oral Oral Oral Oral   SpO2: 99% 100% 97% 97%   Weight:       Height:           Last Labs:  CBC - 4/21/2025: 11:16 AM  9.8 13.6 212    41.0      CMP - 4/21/2025:  6:16 AM  8.7 6.3 15 --- 0.3   _ 3.3 11 60      PTT - No results in last year.  _   _ _     Troponin I, High Sensitivity   Date/Time Value Ref Range Status   04/21/2025 06:54 PM 9 0 - 13 ng/L Final   04/20/2025 10:54 PM 8 0 - 13 ng/L Final   04/20/2025 08:28 PM 9 0 - 13 ng/L Final     BNP   Date/Time Value Ref Range Status   04/20/2025 08:28  (H) 0 - 99 pg/mL Final   09/24/2024 12:00  (H) 0 - 99 pg/mL Final     VLDL   Date/Time Value Ref Range Status   01/27/2023 11:16 AM 13 0 - 40 mg/dL Final   03/16/2021 10:05 AM  13 0 - 40 mg/dL Final   09/01/2020 10:36 AM 19 0 - 40 mg/dL Final      Last I/O:  I/O last 3 completed shifts:  In: 755 (9.6 mL/kg) [P.O.:755]  Out: - (0 mL/kg)   Weight: 78.9 kg     Past Cardiology Tests (Last 3 Years):  EKG:  Electrocardiogram, 12-lead PRN ACS symptoms 04/21/2025      ECG 12 lead 04/20/2025      Electrocardiogram, 12-lead PRN ACS symptoms 05/27/2024      ECG 12 lead 05/23/2024    Echo:  Transthoracic Echo (TTE) Limited 12/13/2024      Transthoracic Echo (TTE) Complete 05/28/2024    Ejection Fractions:  EF   Date/Time Value Ref Range Status   12/13/2024 11:37 AM 58 %      Cath:  No results found for this or any previous visit from the past 1095 days.    Stress Test:  No results found for this or any previous visit from the past 1095 days.    Cardiac Imaging:  No results found for this or any previous visit from the past 1095 days.      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), Disorder of the skin and subcutaneous tissue, unspecified (01/12/2017), Dorsalgia, unspecified (12/09/2013), Dyskinesia of esophagus (09/16/2019), Elevated blood-pressure reading, without diagnosis of hypertension (04/08/2019), Encounter for screening for lipoid disorders (04/18/2019), Essential (primary) hypertension (09/10/2018), Irritable bowel syndrome with diarrhea (11/07/2019), Lumbago with sciatica, left side (04/24/2019), Mastodynia (09/26/2019), Menopausal and female climacteric states (08/19/2019), Other conditions influencing health status (08/30/2018), Other conditions influencing health status (09/09/2013), Other conditions influencing health status, Other conditions influencing health status, Other dysphagia (11/07/2019), Other dysphagia (12/10/2019), Other specified soft tissue disorders (06/26/2019),  Pain in right hip (11/13/2019), Pain in right shoulder (06/26/2019), Pain in thoracic spine (08/26/2020), Personal history of other diseases of the circulatory system, Personal history of other diseases of the digestive system (10/03/2019), Personal history of other diseases of the musculoskeletal system and connective tissue (12/15/2020), Personal history of other drug therapy (10/12/2016), Personal history of other drug therapy (02/09/2021), Personal history of other endocrine, nutritional and metabolic disease (09/10/2018), Personal history of other medical treatment (07/20/2017), Personal history of other mental and behavioral disorders (08/19/2019), Personal history of other specified conditions (09/10/2018), Personal history of other specified conditions (05/01/2019), Personal history of other specified conditions (10/19/2017), Personal history of other specified conditions (07/07/2020), Personal history of other specified conditions (08/30/2018), Personal history of other specified conditions (09/23/2018), Personal history of urinary (tract) infections (09/10/2018), Primary osteoarthritis, unspecified hand (07/07/2020), Sacrococcygeal disorders, not elsewhere classified (12/15/2020), Trigger finger, unspecified finger (10/12/2016), Unspecified atrial fibrillation (Multi) (10/08/2018), Unsteadiness on feet (03/11/2020), and Vitreous degeneration, bilateral (08/26/2019).    Past Surgical History:  She has a past surgical history that includes Esophagogastroduodenoscopy (03/11/2014); Gastric fundoplication (05/27/2014); Other surgical history (05/26/2022); Other surgical history (05/26/2022); and Other surgical history (05/26/2022).      Social History:  She reports that she has never smoked. She has never been exposed to tobacco smoke. She has never used smokeless tobacco. She reports that she does not currently use alcohol. She reports that she does not use drugs.    Family History:  Family History[1]      Allergies:  Nitrofurantoin and Hydrocodone-acetaminophen    Inpatient Medications:  Scheduled Medications[2]  PRN Medications[3]  Continuous Medications[4]  Outpatient Medications:  Current Outpatient Medications   Medication Instructions    atenolol (Tenormin) 25 mg tablet TAKE ONE (1) TABLET BY MOUTH TWICE DAILY WITH MEALS *NEW PRESCRIPTION REQUEST*    digoxin (Lanoxin) 125 MCG tablet TAKE 1 TABLET BY MOUTH 3 TIMES A WEEK *NEW PRESCRIPTION REQUEST*    Eliquis 5 mg tablet TAKE 1 TABLET BY MOUTH TWICE DAILY *NEW PRESCRIPTION REQUEST*    escitalopram (LEXAPRO) 10 mg, oral, Daily    midodrine (Proamatine) 2.5 mg tablet TAKE 1/2 TABLETS (1.25 MG) BY MOUTH 2 TIMES DAILY (MORNING AND LATE AFTERNOON). *NEW PRESCRIPTION REQUEST*    multivitamin tablet 1 tablet, Daily    omeprazole (PRILOSEC) 40 mg, oral, Daily before breakfast       Physical Exam:  General: Alert, oriented to person, place, date/time, pleasant  HEENT: Normocephalic, eyes normal ears normal  Heart: Normal S1, S2 , irregular rhythm , no rubs or gallops   Respiratory: Lungs clear to auscultation bilaterally, no rales, crackles, wheezes or rhonchi  Abdomen: Bowel sounds present in all quadrants  Extremities: No upper or lower extremity edema appreciated  Dermatology: Skin Normal  Genitourinary: Deferred  Neurological: Nonfocal, moves all extremities  Psychology :Appropriate affect and mood      Assessment/Plan   Atrial fibrillation  The patient has had atrial fibrillation for many years and it is longstanding and persistent.  Continue anticoagulation with beta-blockers and digoxin for rate control.  Occasional rapid rates are no concern especially in the setting of potential dehydration and orthostatic hypotension.  Syncopal event was likely secondary to intravascular depletion.  Peers to be no significant bradycardia  Peripheral IV 04/20/25 20 G Left Antecubital (Active)   Site Assessment Dry;Clean;Intact 04/23/25 0730   Dressing Status Clean;Dry 04/23/25  0730   Number of days: 3       Code Status:  Full Code    I spent 30 minutes in the professional and overall care of this patient.        Madan Mcgarry MD       [1]   Family History  Problem Relation Name Age of Onset    Diabetes Father      Coronary artery disease Sister      Coronary artery disease Brother      Glaucoma Brother      Coronary artery disease Other Family Hx     Ovarian cancer Sibling     [2]   Scheduled medications   Medication Dose Route Frequency    apixaban  5 mg oral BID    atenolol  12.5 mg oral BID after meals    digoxin  125 mcg oral Daily    pantoprazole  40 mg oral Daily before breakfast   [3]   PRN medications   Medication    acetaminophen    alum-mag hydroxide-simeth    meclizine    ondansetron   [4]   Continuous Medications   Medication Dose Last Rate

## 2025-04-23 NOTE — PROGRESS NOTES
04/23/25 0812   Discharge Planning   Expected Discharge Disposition Home     No skilled needs    NO BARRIERS TO DISCHARGE FROM CARE TRANSITIONS

## 2025-04-23 NOTE — PROGRESS NOTES
Medication Adjustment    The following medication(s) was/were adjusted for Mercedes Castellanos per protocol/policy due to [facility] approved/hospital use guidelines.    Medication(s) adjusted:   protonix    Christopher Fernandez, JordanD

## 2025-04-23 NOTE — CARE PLAN
The patient's goals for the shift include comfort and rest     The clinical goals for the shift include Pt will remain HDS        Problem: Pain - Adult  Goal: Verbalizes/displays adequate comfort level or baseline comfort level  Outcome: Progressing     Problem: Safety - Adult  Goal: Free from fall injury  Outcome: Progressing     Problem: Discharge Planning  Goal: Discharge to home or other facility with appropriate resources  Outcome: Progressing     Problem: Chronic Conditions and Co-morbidities  Goal: Patient's chronic conditions and co-morbidity symptoms are monitored and maintained or improved  Outcome: Progressing     Problem: Nutrition  Goal: Nutrient intake appropriate for maintaining nutritional needs  Outcome: Progressing     Problem: Fall/Injury  Goal: Not fall by end of shift  Outcome: Progressing  Goal: Be free from injury by end of the shift  Outcome: Progressing  Goal: Verbalize understanding of personal risk factors for fall in the hospital  Outcome: Progressing

## 2025-04-23 NOTE — PROGRESS NOTES
Physical Therapy    Physical Therapy Treatment    Patient Name: Mercedes Castellanos  MRN: [id number]  Department: [address]  Room: [id number]  Today's Date: 4/23/2025  Time Calculation  Start Time: 1414  Stop Time: 1428  Time Calculation (min): 14 min    Assessment/Plan   PT Assessment  Barriers to Discharge Home: No anticipated barriers  End of Session Communication: Bedside nurse  Assessment Comment: pt is independent with no need for further acute skilled PT services. pt has achieved her acute care goals, will discontinue PT.  End of Session Patient Position: Up in chair, Alarm on (needs in reach)    PT Plan  Treatment/Interventions: Bed mobility, Transfer training, Gait training, Strengthening, Endurance training, Therapeutic exercise  PT Plan: Other needs (Comment) (discontinue PT)  PT Frequency: Other (Comment)  PT Discharge Recommendations: Low intensity level of continued care  Equipment Recommended upon Discharge:  (LRAD)  PT Recommended Transfer Status: Stand by assist  PT - OK to Discharge: Yes    General Visit Information:   PT  Visit  PT Received On: 04/23/25  Response to Previous Treatment: Patient with no complaints from previous session.  General  Family/Caregiver Present: No  Prior to Session Communication: Bedside nurse  Patient Position Received: Bed, 3 rail up, Alarm off, not on at start of session  Preferred Learning Style: verbal  General Comment: pt agreeable to therapy and ambulation    Subjective   Precautions:  Precautions  Hearing/Visual Limitations: wears glasses ; mild [place]  Medical Precautions: Fall precautions    Objective   Pain:  Pain Assessment  Pain Assessment: 0-10  0-10 (Numeric) Pain Score: 0 - No pain  Cognition:  Cognition  Overall Cognitive Status: Within Functional Limits  Orientation Level: Oriented X4  Coordination:  Movements are Fluid and Coordinated: Yes  Postural Control:  Postural Control  Posture Comment: thoracic kyphosis in to rounded shoulders, and forward head    Activity  Tolerance:  Activity Tolerance  Endurance: Endurance does not limit participation in activity  Activity Tolerance Comments: good  Rate of Perceived Exertion (RPE): 3  Treatments:  Bed Mobility  Bed Mobility:  (supine to sit independent)    Ambulation/Gait Training  Ambulation/Gait Training Performed:  (200' no assistive device, steady gait, no LOB, mild narrow KARMEN.)  Transfers  Transfer:  (independent to stand from the edge of the bed and independent to transfer to and sit in chair)    Other Activity  Other Activity Performed:  (encouraged increased activity/ambulation with supervision while in the hospital)    Outcome Measures:  [facility] Basic Mobility  Turning from your back to your side while in a flat bed without using bedrails: None  Moving from lying on your back to sitting on the side of a flat bed without using bedrails: None  Moving to and from bed to chair (including a wheelchair): None  Standing up from a chair using your arms (e.g. wheelchair or bedside chair): None  To walk in hospital room: None  Climbing 3-5 steps with railing: A little  Basic Mobility - Total Score: 23    Education Documentation  Precautions, taught by Lauren Enriquez PT at 4/23/2025  3:26 PM.  Learner: Patient  Readiness: Acceptance  Method: Explanation  Response: Verbalizes Understanding    Mobility Training, taught by Lauren Enriquez PT at 4/23/2025  3:26 PM.  Learner: Patient  Readiness: Acceptance  Method: Explanation  Response: Verbalizes Understanding    Education Comments  04/23/25 1527 Lauren Enriquez PT  Educated on increasing activity with assist while in the hospital      Encounter Problems       Encounter Problems (Active)       Pain - Adult             Encounter Problems (Resolved)       Mobility       STG - Patient will ambulate 150' x 1 using LRAD vs no Assistive Device with MOD INDEPENDENT (Met)       Start:  04/22/25    Expected End:  05/06/25    Resolved:  04/23/25            PT Transfers       STG -  Patient to transfer to and from sit to supine  INDEPENDENTLY (Met)       Start:  04/22/25    Expected End:  05/06/25    Resolved:  04/22/25         STG - Patient will transfer sit to and from stand INDEPENDENTLY (Met)       Start:  04/22/25    Expected End:  05/06/25    Resolved:  04/23/25

## 2025-04-23 NOTE — PROGRESS NOTES
Mercedes Castellanos is a 85 y.o. female on day 1 of admission presenting with UTI (urinary tract infection).      Subjective   AWAITTING FOR DR Montgomery WANTS TO GO HOME       Objective     Last Recorded Vitals  /85 (BP Location: Left arm, Patient Position: Lying)   Pulse 76   Temp 36.8 °C (98.2 °F) (Oral)   Resp 21   Wt 78.9 kg (174 lb)   SpO2 97%   Intake/Output last 3 Shifts:    Intake/Output Summary (Last 24 hours) at 4/23/2025 1354  Last data filed at 4/23/2025 1009  Gross per 24 hour   Intake 425 ml   Output --   Net 425 ml       Admission Weight  Weight: 78.9 kg (174 lb) (04/20/25 2013)    Daily Weight  04/20/25 : 78.9 kg (174 lb)    Image Results  Electrocardiogram, 12-lead PRN ACS symptoms  Atrial fibrillation  ST & T wave abnormality, consider inferolateral ischemia  Abnormal ECG  Confirmed by Khris Christian ([id number]) on 4/22/2025 11:51:08 AM      Physical Exam/better    Relevant Results                              Assessment & Plan  UTI (urinary tract infection)    Atrial fibrillation with RVR (Multi)    Probable home tomorrow           Ruth Cervantes MD

## 2025-04-24 ENCOUNTER — PHARMACY VISIT (OUTPATIENT)
Dept: PHARMACY | Facility: CLINIC | Age: 85
End: 2025-04-24
Payer: COMMERCIAL

## 2025-04-24 VITALS
RESPIRATION RATE: 18 BRPM | WEIGHT: 174 LBS | DIASTOLIC BLOOD PRESSURE: 93 MMHG | TEMPERATURE: 98.2 F | HEART RATE: 78 BPM | SYSTOLIC BLOOD PRESSURE: 141 MMHG | HEIGHT: 63 IN | OXYGEN SATURATION: 97 % | BODY MASS INDEX: 30.83 KG/M2

## 2025-04-24 LAB
ANION GAP SERPL CALCULATED.3IONS-SCNC: 10 MMOL/L (ref 10–20)
ANION GAP SERPL CALCULATED.3IONS-SCNC: 12 MMOL/L (ref 10–20)
BUN SERPL-MCNC: 14 MG/DL (ref 6–23)
CALCIUM SERPL-MCNC: 8.9 MG/DL (ref 8.6–10.3)
CHLORIDE SERPL-SCNC: 104 MMOL/L (ref 98–107)
CHLORIDE SERPL-SCNC: 109 MMOL/L (ref 98–107)
CO2 SERPL-SCNC: 27 MMOL/L (ref 21–32)
CO2 SERPL-SCNC: 29 MMOL/L (ref 21–32)
CREAT SERPL-MCNC: 0.72 MG/DL (ref 0.5–1.05)
EGFRCR SERPLBLD CKD-EPI 2021: 82 ML/MIN/1.73M*2
ERYTHROCYTE [DISTWIDTH] IN BLOOD BY AUTOMATED COUNT: 14.3 % (ref 11.5–14.5)
GLUCOSE SERPL-MCNC: 115 MG/DL (ref 74–99)
HCT VFR BLD AUTO: 43.5 % (ref 36–46)
HGB BLD-MCNC: 14.3 G/DL (ref 12–16)
MCH RBC QN AUTO: 29.8 PG (ref 26–34)
MCHC RBC AUTO-ENTMCNC: 32.9 G/DL (ref 32–36)
MCV RBC AUTO: 91 FL (ref 80–100)
NRBC BLD-RTO: 0 /100 WBCS (ref 0–0)
PLATELET # BLD AUTO: 215 X10*3/UL (ref 150–450)
POTASSIUM SERPL-SCNC: 3.6 MMOL/L (ref 3.5–5.3)
POTASSIUM SERPL-SCNC: 3.7 MMOL/L (ref 3.5–5.3)
RBC # BLD AUTO: 4.8 X10*6/UL (ref 4–5.2)
SODIUM SERPL-SCNC: 141 MMOL/L (ref 136–145)
SODIUM SERPL-SCNC: 142 MMOL/L (ref 136–145)
WBC # BLD AUTO: 8.3 X10*3/UL (ref 4.4–11.3)

## 2025-04-24 PROCEDURE — 82374 ASSAY BLOOD CARBON DIOXIDE: CPT | Performed by: INTERNAL MEDICINE

## 2025-04-24 PROCEDURE — 36415 COLL VENOUS BLD VENIPUNCTURE: CPT | Performed by: INTERNAL MEDICINE

## 2025-04-24 PROCEDURE — 99231 SBSQ HOSP IP/OBS SF/LOW 25: CPT | Performed by: REGISTERED NURSE

## 2025-04-24 PROCEDURE — 2500000001 HC RX 250 WO HCPCS SELF ADMINISTERED DRUGS (ALT 637 FOR MEDICARE OP): Performed by: NURSE PRACTITIONER

## 2025-04-24 PROCEDURE — 85027 COMPLETE CBC AUTOMATED: CPT | Performed by: INTERNAL MEDICINE

## 2025-04-24 PROCEDURE — 2500000001 HC RX 250 WO HCPCS SELF ADMINISTERED DRUGS (ALT 637 FOR MEDICARE OP): Performed by: INTERNAL MEDICINE

## 2025-04-24 PROCEDURE — 80048 BASIC METABOLIC PNL TOTAL CA: CPT | Performed by: INTERNAL MEDICINE

## 2025-04-24 PROCEDURE — 2500000002 HC RX 250 W HCPCS SELF ADMINISTERED DRUGS (ALT 637 FOR MEDICARE OP, ALT 636 FOR OP/ED): Performed by: INTERNAL MEDICINE

## 2025-04-24 PROCEDURE — RXMED WILLOW AMBULATORY MEDICATION CHARGE

## 2025-04-24 PROCEDURE — 2500000001 HC RX 250 WO HCPCS SELF ADMINISTERED DRUGS (ALT 637 FOR MEDICARE OP): Performed by: PHARMACIST

## 2025-04-24 RX ORDER — POTASSIUM CHLORIDE 750 MG/1
10 TABLET, FILM COATED, EXTENDED RELEASE ORAL ONCE
Status: COMPLETED | OUTPATIENT
Start: 2025-04-24 | End: 2025-04-24

## 2025-04-24 RX ORDER — MECLIZINE HYDROCHLORIDE 25 MG/1
25 TABLET ORAL EVERY 6 HOURS PRN
Qty: 30 TABLET | Refills: 0 | Status: SHIPPED | OUTPATIENT
Start: 2025-04-24

## 2025-04-24 RX ORDER — ATENOLOL 25 MG/1
12.5 TABLET ORAL
Qty: 90 TABLET | Refills: 3 | Status: SHIPPED | OUTPATIENT
Start: 2025-04-24

## 2025-04-24 RX ORDER — POTASSIUM CHLORIDE 750 MG/1
10 TABLET, FILM COATED, EXTENDED RELEASE ORAL DAILY
Status: DISCONTINUED | OUTPATIENT
Start: 2025-04-24 | End: 2025-04-24

## 2025-04-24 RX ORDER — DIGOXIN 125 MCG
125 TABLET ORAL DAILY
Qty: 90 TABLET | Refills: 3 | Status: SHIPPED | OUTPATIENT
Start: 2025-04-24

## 2025-04-24 RX ADMIN — PANTOPRAZOLE SODIUM 40 MG: 40 TABLET, DELAYED RELEASE ORAL at 05:29

## 2025-04-24 RX ADMIN — POTASSIUM CHLORIDE 10 MEQ: 750 TABLET, EXTENDED RELEASE ORAL at 09:00

## 2025-04-24 RX ADMIN — DIGOXIN 125 MCG: 125 TABLET ORAL at 09:00

## 2025-04-24 RX ADMIN — ATENOLOL 12.5 MG: 25 TABLET ORAL at 09:00

## 2025-04-24 RX ADMIN — APIXABAN 5 MG: 5 TABLET, FILM COATED ORAL at 09:00

## 2025-04-24 ASSESSMENT — COGNITIVE AND FUNCTIONAL STATUS - GENERAL
CLIMB 3 TO 5 STEPS WITH RAILING: A LITTLE
DAILY ACTIVITIY SCORE: 23
DRESSING REGULAR UPPER BODY CLOTHING: A LITTLE
MOBILITY SCORE: 23

## 2025-04-24 ASSESSMENT — PAIN - FUNCTIONAL ASSESSMENT: PAIN_FUNCTIONAL_ASSESSMENT: 0-10

## 2025-04-24 ASSESSMENT — PAIN SCALES - GENERAL
PAINLEVEL_OUTOF10: 0 - NO PAIN
PAINLEVEL_OUTOF10: 0 - NO PAIN

## 2025-04-24 NOTE — CARE PLAN
The patient's goals for the shift include      The clinical goals for the shift include to remain free from falls

## 2025-04-24 NOTE — CARE PLAN
The patient's goals for the shift include      The clinical goals for the shift include to remain free from falls      Problem: Pain - Adult  Goal: Verbalizes/displays adequate comfort level or baseline comfort level  Outcome: Progressing     Problem: Safety - Adult  Goal: Free from fall injury  Outcome: Progressing     Problem: Discharge Planning  Goal: Discharge to home or other facility with appropriate resources  Outcome: Progressing     Problem: Chronic Conditions and Co-morbidities  Goal: Patient's chronic conditions and co-morbidity symptoms are monitored and maintained or improved  Outcome: Progressing     Problem: Nutrition  Goal: Nutrient intake appropriate for maintaining nutritional needs  Outcome: Progressing     Problem: Fall/Injury  Goal: Not fall by end of shift  Outcome: Progressing  Goal: Be free from injury by end of the shift  Outcome: Progressing  Goal: Verbalize understanding of personal risk factors for fall in the hospital  Outcome: Progressing

## 2025-04-24 NOTE — PROGRESS NOTES
Mercedes Castellanos is a 85 y.o. female on day 2 of admission presenting with UTI (urinary tract infection).      Subjective   Go home today       Objective     Last Recorded Vitals  /88 (BP Location: Right arm, Patient Position: Lying)   Pulse 78   Temp 36 °C (96.8 °F) (Oral)   Resp 18   Wt 78.9 kg (174 lb)   SpO2 95%   Intake/Output last 3 Shifts:    Intake/Output Summary (Last 24 hours) at 4/24/2025 0823  Last data filed at 4/23/2025 1009  Gross per 24 hour   Intake 150 ml   Output --   Net 150 ml       Admission Weight  Weight: 78.9 kg (174 lb) (04/20/25 2013)    Daily Weight  04/20/25 : 78.9 kg (174 lb)    Image Results  Electrocardiogram, 12-lead PRN ACS symptoms  Atrial fibrillation  ST & T wave abnormality, consider inferolateral ischemia  Abnormal ECG  Confirmed by Khris Christian ([id number]) on 4/22/2025 11:51:08 AM      Physical Exam/better    Relevant Results                              Assessment & Plan  UTI (urinary tract infection)    Atrial fibrillation with RVR (Multi)    Cleared by dr Mcgarry ok to go home           Ruth Cervantes MD

## 2025-04-24 NOTE — CARE PLAN
Patient completed rocephin course  Chart reviewed-vital signs and labs stable  Primary note reviewed-plan to discharge home.

## 2025-08-13 ENCOUNTER — DOCUMENTATION (OUTPATIENT)
Dept: INTERNAL MEDICINE | Facility: HOSPITAL | Age: 85
End: 2025-08-13
Payer: MEDICARE

## 2025-08-19 ENCOUNTER — TELEPHONE (OUTPATIENT)
Age: 85
End: 2025-08-19
Payer: MEDICARE

## 2025-08-22 ENCOUNTER — TELEPHONE (OUTPATIENT)
Age: 85
End: 2025-08-22
Payer: MEDICARE

## 2025-08-25 DIAGNOSIS — I48.91 ATRIAL FIBRILLATION WITH RVR (MULTI): ICD-10-CM

## 2025-08-25 RX ORDER — DIGOXIN 125 MCG
125 TABLET ORAL DAILY
Qty: 90 TABLET | Refills: 3 | Status: SHIPPED | OUTPATIENT
Start: 2025-08-25

## 2025-08-29 ENCOUNTER — HOSPITAL ENCOUNTER (OUTPATIENT)
Dept: RADIOLOGY | Facility: HOSPITAL | Age: 85
Discharge: HOME | End: 2025-08-29
Payer: MEDICARE

## 2025-08-29 DIAGNOSIS — R41.3 OTHER AMNESIA: ICD-10-CM

## 2025-08-29 PROCEDURE — 70551 MRI BRAIN STEM W/O DYE: CPT

## 2025-09-09 ENCOUNTER — APPOINTMENT (OUTPATIENT)
Age: 85
End: 2025-09-09
Payer: MEDICARE

## 2025-09-24 ENCOUNTER — APPOINTMENT (OUTPATIENT)
Facility: CLINIC | Age: 85
End: 2025-09-24
Payer: MEDICARE